# Patient Record
Sex: FEMALE | Race: WHITE | NOT HISPANIC OR LATINO | Employment: FULL TIME | URBAN - METROPOLITAN AREA
[De-identification: names, ages, dates, MRNs, and addresses within clinical notes are randomized per-mention and may not be internally consistent; named-entity substitution may affect disease eponyms.]

---

## 2017-01-24 ENCOUNTER — OFFICE VISIT (OUTPATIENT)
Dept: GASTROENTEROLOGY | Facility: CLINIC | Age: 57
End: 2017-01-24

## 2017-01-24 VITALS — WEIGHT: 175.8 LBS | HEIGHT: 65 IN | BODY MASS INDEX: 29.29 KG/M2

## 2017-01-24 DIAGNOSIS — K59.1 FUNCTIONAL DIARRHEA: ICD-10-CM

## 2017-01-24 DIAGNOSIS — R10.33 PERIUMBILICAL ABDOMINAL PAIN: Primary | ICD-10-CM

## 2017-01-24 PROCEDURE — 99214 OFFICE O/P EST MOD 30 MIN: CPT | Performed by: INTERNAL MEDICINE

## 2017-01-24 NOTE — MR AVS SNAPSHOT
Rosalinda Boone   1/24/2017 3:30 PM   Office Visit    Dept Phone:  [phone]   Encounter #:  [id number]    Provider:  Sandra Real MD   Department:  [facility] GASTROENTEROLOGY                Your Full Care Plan              Today's Medication Changes          These changes are accurate as of: 1/24/17  3:58 PM.  If you have any questions, ask your nurse or doctor.               New Medication(s)Ordered:     hyoscyamine 0.125 MG SL tablet   Commonly known as:  LEVSIN   Take 1 tablet by mouth Every 4 (Four) Hours As Needed for cramping.   Started by:  Sandra Real MD       rifaximin 550 MG tablet   Commonly known as:  XIFAXAN   Take 1 tablet by mouth Every 8 (Eight) Hours.   Started by:  Sandra Real MD            Where to Get Your Medications      These medications were sent to [email or web address] [address] AT [facility] of [address] [phone] [place] [phone] [place] [address], [facility] [phone]     Phone:  [phone]     hyoscyamine 0.125 MG SL tablet    rifaximin 550 MG tablet                  Your Updated Medication List          This list is accurate as of: 1/24/17  3:58 PM.  Always use your most recent med list.                ASMANEX 60 METERED DOSES 220 MCG/INH inhaler   Generic drug:  mometasone       BENEFIBER powder       cycloSPORINE 0.05 % ophthalmic emulsion   Commonly known as:  RESTASIS       desloratadine 5 MG tablet   Commonly known as:  CLARINEX       GLUCOSAMINE CHONDR 1500 COMPLX PO       hyoscyamine 0.125 MG SL tablet   Commonly known as:  LEVSIN   Take 1 tablet by mouth Every 4 (Four) Hours As Needed for cramping.       montelukast 10 MG tablet   Commonly known as:  SINGULAIR       MULTI VITAMIN DAILY PO       norethindrone-ethinyl estradiol 1-5 MG-MCG tablet   Commonly known as:  FEMHRT 1/5       pantoprazole 40 MG EC tablet   Commonly known as:  PROTONIX       PROAIR  (90 BASE) MCG/ACT inhaler   Generic drug:  albuterol       rifaximin 550 MG tablet   Commonly known as:  XIFAXAN   Take 1 tablet by mouth Every 8 (Eight) Hours.       Unable to find               You Were Diagnosed With        Codes Comments    Periumbilical abdominal pain    -  Primary ICD-10-CM: R10.33  ICD-9-CM: 789.05 plan for CT abd for further eval    Functional diarrhea     ICD-10-CM: K59.1  ICD-9-CM: 564.5 presumed IBS-d with negative w/u to date - trial antispasmodic, xifaxan - IBgard prn      Instructions     None    Patient Instructions History      Upcoming Appointments     Visit Type Date Time Department    FOLLOW UP 1/24/2017  3:30 PM [facility]    [facility] 6/12/2017  8:10 AM [facility]    PHYSICAL 6/16/2017  8:00 AM [facility]      [employer] Signup     Our records indicate that you have an active [employer] account.    You can view your After Visit Summary by going to [employer] and logging in with your [employer] username and password.  If you don't have a [employer] username and password but a parent or guardian has access to your record, the parent or guardian should login with their own [employer] username and password and access your record to view the After Visit Summary.    If you have questions, you can email [email or web address] or call [phone] to talk to our [employer] staff.  Remember, [employer] is NOT to be used for urgent needs.  For medical emergencies, dial 911.               Other Info from Your Visit           Your Appointments     Jun 12, 2017  8:10 AM EDT   [facility] with [facility]   [facility] INTERNAL MEDICINE (--)    [address] [place] [address] [place], [place] [id number] [phone]            Jun 16, 2017  8:00 AM EDT   Physical with Shraddha Sosa MD   [facility] INTERNAL MEDICINE (--)    [address] [place] [address] [place], [place] [id number] [phone]           Arrive 15 minutes  "prior to appointment.              Allergies     Codeine      Erythromycin  Diarrhea    Levaquin [Levofloxacin]      Sulfa Antibiotics        Reason for Visit     Abdominal Pain     Diarrhea     Nausea           Vital Signs     Height Weight Body Mass Index Smoking Status          65\" (165.1 cm) 175 lb 12.8 oz (79.7 kg) 29.25 kg/m2 Never Smoker        Problems and Diagnoses Noted     Abdominal pain, around belly button    -  Primary    Functional diarrhea            "

## 2017-01-24 NOTE — PROGRESS NOTES
Chief Complaint   Patient presents with   • Abdominal Pain   • Diarrhea   • Nausea       Rosalinda Boone is a  56 y.o. female here for a follow up visit for crampy rlq/periumbilical pain.  Intermittent - not related to certain foods.  Recent 24 hours of significant diarrhea x 24 hours with lingering cramping.  Takes IBgard when she feels bloated.  Takes fiber daily. She almost cut dairy out but still consumes some.  Dairy definitely made things worse. Intermittent upset stomach with nausea and diarrhea. Recent egd and c/s essentially normal.  She had a negative GB u/s last summer.    HPI  Past Medical History   Diagnosis Date   • Asthma    • Right sided abdominal pain    • Tingling      Past Surgical History   Procedure Laterality Date   • D&c with suction     • Lasik     • Endoscopy N/A 12/5/2016     z line regular, 40 cm from incisors,  granular gastric mucosa, chronic gastritis   • Colonoscopy N/A 12/5/2016     [facility],        Current Outpatient Prescriptions:   •  ASMANEX 60 METERED DOSES 220 MCG/INH inhaler, , Disp: , Rfl:   •  cycloSPORINE (RESTASIS) 0.05 % ophthalmic emulsion, 1 drop 2 (two) times a day, Disp: , Rfl:   •  desloratadine (CLARINEX) 5 MG tablet, , Disp: , Rfl:   •  Glucosamine-Chondroit-Vit C-Mn (GLUCOSAMINE CHONDR 1500 COMPLX PO), Take 2 tablets by mouth daily., Disp: , Rfl:   •  montelukast (SINGULAIR) 10 MG tablet, , Disp: , Rfl:   •  Multiple Vitamin (MULTI VITAMIN DAILY PO), Take 1 tablet by mouth daily., Disp: , Rfl:   •  norethindrone-ethinyl estradiol (FEMHRT 1/5) 1-5 MG-MCG tablet, Take 1 tablet by mouth Daily., Disp: , Rfl:   •  pantoprazole (PROTONIX) 40 MG EC tablet, , Disp: , Rfl:   •  PROAIR  (90 BASE) MCG/ACT inhaler, , Disp: , Rfl:   •  Unable to find, 1 each 1 (One) Time. IB GUARD, Disp: , Rfl:   •  Wheat Dextrin (BENEFIBER) powder, Take 1 application by mouth Daily., Disp: , Rfl:   •  hyoscyamine (LEVSIN) 0.125 MG SL tablet, Take 1 tablet by mouth Every 4 (Four) Hours As  Needed for cramping., Disp: 60 tablet, Rfl: 3  •  rifaximin (XIFAXAN) 550 MG tablet, Take 1 tablet by mouth Every 8 (Eight) Hours., Disp: 42 tablet, Rfl: 2  PRN Meds:.  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     Social History Main Topics   • Smoking status: Never Smoker   • Smokeless tobacco: Never Used   • Alcohol use 0.6 - 1.2 oz/week     1 - 2 Glasses of wine per week      Comment: daily   • Drug use: No   • Sexual activity: Yes     Partners: Male     Other Topics Concern   • Not on file     Social History Narrative    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   Respiratory: Negative for cough.    Gastrointestinal: Positive for nausea.     There were no vitals filed for this visit.  Last Weight    01/24/17  1516   Weight: 175 lb 12.8 oz (79.7 kg)     Physical Exam   Constitutional: She appears well-developed and well-nourished.   HENT:   Head: Normocephalic and atraumatic.   Eyes: No scleral icterus.   Abdominal: Soft. She exhibits no distension and no mass. There is tenderness.       Neurological: She is alert.   Skin: Skin is warm and dry.   Psychiatric: She has a normal mood and affect.     No images are attached to the encounter.  Diagnoses and all orders for this visit:    Periumbilical abdominal pain  Comments:  plan for CT abd for further eval  Orders:  -     CT Abdomen Pelvis With  Contrast; Future    Functional diarrhea  Comments:  presumed IBS-d with negative w/u to date - trial antispasmodic, xifaxan - IBgard prn    Other orders  -     Unable to find; 1 each 1 (One) Time. IB GUARD  -     Discontinue: rifaximin (XIFAXAN) 550 MG tablet; Take 1 tablet by mouth Every 8 (Eight) Hours.  -     Discontinue: hyoscyamine (LEVSIN) 0.125 MG SL tablet; Take 1 tablet by mouth Every 4 (Four) Hours As Needed for cramping.  -     hyoscyamine (LEVSIN) 0.125 MG SL tablet; Take 1 tablet by mouth Every 4 (Four) Hours As Needed for cramping.  -     rifaximin (XIFAXAN) 550 MG tablet; Take 1 tablet by mouth Every 8 (Eight) Hours.

## 2017-02-09 ENCOUNTER — HOSPITAL ENCOUNTER (OUTPATIENT)
Dept: CT IMAGING | Facility: HOSPITAL | Age: 57
Discharge: HOME | End: 2017-02-09
Attending: INTERNAL MEDICINE | Admitting: INTERNAL MEDICINE

## 2017-02-09 DIAGNOSIS — R10.33 PERIUMBILICAL ABDOMINAL PAIN: ICD-10-CM

## 2017-02-09 PROCEDURE — 25510000001 DIATRIZOATE MEGLUMINE & SODIUM PER 1 ML: Performed by: INTERNAL MEDICINE

## 2017-02-09 PROCEDURE — 82565 ASSAY OF CREATININE: CPT

## 2017-02-09 PROCEDURE — 74177 CT ABD & PELVIS W/CONTRAST: CPT

## 2017-02-09 PROCEDURE — 0 IOPAMIDOL 61 % SOLUTION: Performed by: INTERNAL MEDICINE

## 2017-02-09 RX ADMIN — DIATRIZOATE MEGLUMINE AND DIATRIZOATE SODIUM 30 ML: 660; 100 LIQUID ORAL; RECTAL at 08:00

## 2017-02-09 RX ADMIN — IOPAMIDOL 85 ML: 612 INJECTION, SOLUTION INTRAVENOUS at 09:00

## 2017-02-10 ENCOUNTER — TELEPHONE (OUTPATIENT)
Dept: GASTROENTEROLOGY | Facility: CLINIC | Age: 57
End: 2017-02-10

## 2017-02-10 LAB — CREAT BLDA-MCNC: 0.7 MG/DL (ref 0.6–1.3)

## 2017-02-10 NOTE — TELEPHONE ENCOUNTER
VM to pt - identifies as Rosalinda Boone.  Message left per Dr Real that her CT was normal.  If any questions, contact office.

## 2017-02-10 NOTE — TELEPHONE ENCOUNTER
----- Message from Sandra Real MD sent at 2/10/2017 12:06 PM EST -----  pls let the patient know that her CT was normal

## 2017-03-28 ENCOUNTER — OFFICE VISIT (OUTPATIENT)
Dept: GASTROENTEROLOGY | Facility: CLINIC | Age: 57
End: 2017-03-28

## 2017-03-28 VITALS
SYSTOLIC BLOOD PRESSURE: 124 MMHG | BODY MASS INDEX: 29.26 KG/M2 | DIASTOLIC BLOOD PRESSURE: 78 MMHG | HEIGHT: 65 IN | WEIGHT: 175.6 LBS

## 2017-03-28 DIAGNOSIS — E73.9 LACTOSE INTOLERANCE IN ADULT: ICD-10-CM

## 2017-03-28 DIAGNOSIS — K21.9 GASTROESOPHAGEAL REFLUX DISEASE WITHOUT ESOPHAGITIS: Primary | ICD-10-CM

## 2017-03-28 DIAGNOSIS — K58.0 IRRITABLE BOWEL SYNDROME WITH DIARRHEA: ICD-10-CM

## 2017-03-28 PROCEDURE — 99213 OFFICE O/P EST LOW 20 MIN: CPT | Performed by: INTERNAL MEDICINE

## 2017-03-28 RX ORDER — LANSOPRAZOLE 30 MG/1
30 CAPSULE, DELAYED RELEASE ORAL DAILY
Qty: 90 CAPSULE | Refills: 3 | Status: SHIPPED | OUTPATIENT
Start: 2017-03-28 | End: 2017-11-10

## 2017-03-28 NOTE — PROGRESS NOTES
Chief Complaint   Patient presents with   • Follow-up   • Heartburn   • Bloated       Rosalinda Boone is a  56 y.o. female here for a follow up visit for IBS-d, bloating.   Heartburn has been worse of late.  Symptoms both day and night.  Has been on pantoprazole with persistant symptoms.  Has tried rabeprazole and omeprazole in the past.  Her diarrhea has improved - has not had much diarrhea but continues to be blaoted  Her abdominal pain has improved. Since last visit she has had a CT scan which was negative.  She follows a dairy free diet.  HPI  Past Medical History:   Diagnosis Date   • Asthma    • Right sided abdominal pain    • Tingling      Past Surgical History:   Procedure Laterality Date   • COLONOSCOPY N/A 12/5/2016    [facility],    • D&C WITH SUCTION     • ENDOSCOPY N/A 12/5/2016    z line regular, 40 cm from incisors,  granular gastric mucosa, chronic gastritis   • LASIK         Current Outpatient Prescriptions:   •  ASMANEX 60 METERED DOSES 220 MCG/INH inhaler, , Disp: , Rfl:   •  cycloSPORINE (RESTASIS) 0.05 % ophthalmic emulsion, 1 drop 2 (two) times a day, Disp: , Rfl:   •  desloratadine (CLARINEX) 5 MG tablet, , Disp: , Rfl:   •  Glucosamine-Chondroit-Vit C-Mn (GLUCOSAMINE CHONDR 1500 COMPLX PO), Take 2 tablets by mouth daily., Disp: , Rfl:   •  montelukast (SINGULAIR) 10 MG tablet, , Disp: , Rfl:   •  Multiple Vitamin (MULTI VITAMIN DAILY PO), Take 1 tablet by mouth daily., Disp: , Rfl:   •  PROAIR  (90 BASE) MCG/ACT inhaler, , Disp: , Rfl:   •  Unable to find, 1 each 1 (One) Time. IB GUARD, Disp: , Rfl:   •  Wheat Dextrin (BENEFIBER) powder, Take 1 application by mouth Daily., Disp: , Rfl:   •  hyoscyamine (LEVSIN) 0.125 MG SL tablet, Take 1 tablet by mouth Every 4 (Four) Hours As Needed for cramping., Disp: 60 tablet, Rfl: 3  •  lansoprazole (PREVACID) 30 MG capsule, Take 1 capsule by mouth Daily., Disp: 90 capsule, Rfl: 3  •  norethindrone-ethinyl estradiol (FEMHRT 1/5) 1-5 MG-MCG tablet, Take 1  tablet by mouth Daily., Disp: , Rfl:   •  rifaximin (XIFAXAN) 550 MG tablet, Take 1 tablet by mouth Every 8 (Eight) Hours., Disp: 42 tablet, Rfl: 2  PRN Meds:.  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     Social History Main Topics   • Smoking status: Never Smoker   • Smokeless tobacco: Never Used   • Alcohol use 0.6 - 1.2 oz/week     1 - 2 Glasses of wine per week      Comment: daily   • Drug use: No   • Sexual activity: Yes     Partners: Male     Other Topics Concern   • Not on file     Social History Narrative    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   HENT:   Head: Normocephalic and atraumatic.   Eyes: No scleral icterus.   Abdominal: Soft. She exhibits no distension and no mass.   Neurological: She is alert.   Skin: Skin is warm and dry.   Psychiatric: She has a normal mood and affect.     No images are attached to the encounter.  Diagnoses and all orders for this visit:    Gastroesophageal reflux disease without esophagitis    Irritable bowel syndrome with diarrhea    Lactose intolerance in adult    Other orders  -     lansoprazole (PREVACID) 30 MG capsule; Take 1 capsule by mouth Daily.    Plan:- continue fiber  - dairy  free diet  - trial xifaxan with continued bloating  - switch to lansoprazole due to persistant symptoms despite pantoprazole

## 2017-06-09 ENCOUNTER — RESULTS ENCOUNTER (OUTPATIENT)
Dept: INTERNAL MEDICINE | Facility: CLINIC | Age: 57
End: 2017-06-09

## 2017-06-09 DIAGNOSIS — K21.9 GASTROESOPHAGEAL REFLUX DISEASE, ESOPHAGITIS PRESENCE NOT SPECIFIED: ICD-10-CM

## 2017-06-09 DIAGNOSIS — Z00.00 HEALTHCARE MAINTENANCE: ICD-10-CM

## 2017-06-09 DIAGNOSIS — E53.8 B12 DEFICIENCY: ICD-10-CM

## 2017-07-07 ENCOUNTER — OFFICE VISIT (OUTPATIENT)
Dept: INTERNAL MEDICINE | Facility: CLINIC | Age: 57
End: 2017-07-07

## 2017-07-07 VITALS
OXYGEN SATURATION: 99 % | WEIGHT: 176.7 LBS | HEIGHT: 65 IN | SYSTOLIC BLOOD PRESSURE: 108 MMHG | BODY MASS INDEX: 29.44 KG/M2 | DIASTOLIC BLOOD PRESSURE: 64 MMHG | HEART RATE: 74 BPM

## 2017-07-07 DIAGNOSIS — R31.9 URINARY TRACT INFECTION WITH HEMATURIA, SITE UNSPECIFIED: Primary | ICD-10-CM

## 2017-07-07 DIAGNOSIS — N39.0 URINARY TRACT INFECTION WITH HEMATURIA, SITE UNSPECIFIED: Primary | ICD-10-CM

## 2017-07-07 LAB
BILIRUB BLD-MCNC: NEGATIVE MG/DL
CLARITY, POC: CLEAR
COLOR UR: YELLOW
GLUCOSE UR STRIP-MCNC: NEGATIVE MG/DL
KETONES UR QL: NEGATIVE
LEUKOCYTE EST, POC: ABNORMAL
NITRITE UR-MCNC: NEGATIVE MG/ML
PH UR: 6 [PH] (ref 5–8)
PROT UR STRIP-MCNC: NEGATIVE MG/DL
RBC # UR STRIP: ABNORMAL /UL
SP GR UR: 1.01 (ref 1–1.03)
UROBILINOGEN UR QL: NORMAL

## 2017-07-07 PROCEDURE — 81003 URINALYSIS AUTO W/O SCOPE: CPT | Performed by: NURSE PRACTITIONER

## 2017-07-07 PROCEDURE — 99213 OFFICE O/P EST LOW 20 MIN: CPT | Performed by: NURSE PRACTITIONER

## 2017-07-07 RX ORDER — NITROFURANTOIN 25; 75 MG/1; MG/1
100 CAPSULE ORAL 2 TIMES DAILY
Qty: 10 CAPSULE | Refills: 0 | Status: SHIPPED | OUTPATIENT
Start: 2017-07-07 | End: 2017-07-20

## 2017-07-07 NOTE — PROGRESS NOTES
"Subjective   Rosalinda Boone is a 57 y.o. female who states she thinks she may have a UTI. She started 2 days ago with some burning with u/a.     History of Present Illness   The patient is here today with c/o dysuria and cramping. Noticed incomplete bladder emptying starting yesterday.     Used to have UTIs frequently saw urology for blood in urine. Work up ok per patient. Has not had a UTI in a \"long time\".  The following portions of the patient's history were reviewed and updated as appropriate: allergies, current medications, past family history, past medical history, past social history, past surgical history and problem list.    Review of Systems   Constitutional: Negative.    Respiratory: Negative.    Cardiovascular: Negative.    Genitourinary: Positive for dysuria, frequency and urgency. Negative for hematuria.   Psychiatric/Behavioral: Negative.    All other systems reviewed and are negative.      Objective   Physical Exam   Constitutional: She appears well-developed and well-nourished.   Neck: Normal range of motion. Neck supple. No thyromegaly present.   Cardiovascular: Normal rate, regular rhythm, normal heart sounds and intact distal pulses.    Pulmonary/Chest: Effort normal and breath sounds normal.   Abdominal: There is no tenderness. There is no CVA tenderness.   Skin: Skin is warm and dry.   Psychiatric: She has a normal mood and affect. Her behavior is normal. Judgment and thought content normal.       Assessment/Plan   Rosalinda was seen today for urinary tract infection.    Diagnoses and all orders for this visit:    Urinary tract infection with hematuria, site unspecified  -     POCT urinalysis dipstick, automated  -     Urine culture (clean catch)      1. UTI- UA pos. For leuks, will go ahead and treat with macrobid as she has done well with this in the past. Will send urine for culture. Hydrate well. Probiotic daily.   Off xifaxin right now.          "

## 2017-07-08 LAB
25(OH)D3+25(OH)D2 SERPL-MCNC: 34.2 NG/ML (ref 30–100)
ALBUMIN SERPL-MCNC: 4.3 G/DL (ref 3.5–5.2)
ALBUMIN/GLOB SERPL: 1.8 G/DL
ALP SERPL-CCNC: 50 U/L (ref 39–117)
ALT SERPL-CCNC: 16 U/L (ref 1–33)
AST SERPL-CCNC: 16 U/L (ref 1–32)
BILIRUB SERPL-MCNC: 0.6 MG/DL (ref 0.1–1.2)
BUN SERPL-MCNC: 12 MG/DL (ref 6–20)
BUN/CREAT SERPL: 14.6 (ref 7–25)
CALCIUM SERPL-MCNC: 9.1 MG/DL (ref 8.6–10.5)
CHLORIDE SERPL-SCNC: 98 MMOL/L (ref 98–107)
CHOLEST SERPL-MCNC: 198 MG/DL (ref 0–200)
CO2 SERPL-SCNC: 23.7 MMOL/L (ref 22–29)
CREAT SERPL-MCNC: 0.82 MG/DL (ref 0.57–1)
GLOBULIN SER CALC-MCNC: 2.4 GM/DL
GLUCOSE SERPL-MCNC: 90 MG/DL (ref 65–99)
HCV AB S/CO SERPL IA: <0.1 S/CO RATIO (ref 0–0.9)
HDLC SERPL-MCNC: 69 MG/DL (ref 40–60)
LDLC SERPL CALC-MCNC: 96 MG/DL (ref 0–100)
LDLC/HDLC SERPL: 1.39 {RATIO}
POTASSIUM SERPL-SCNC: 4.4 MMOL/L (ref 3.5–5.2)
PROT SERPL-MCNC: 6.7 G/DL (ref 6–8.5)
SODIUM SERPL-SCNC: 139 MMOL/L (ref 136–145)
TRIGL SERPL-MCNC: 165 MG/DL (ref 0–150)
TSH SERPL DL<=0.005 MIU/L-ACNC: 2.15 MIU/ML (ref 0.27–4.2)
VIT B12 SERPL-MCNC: 443 PG/ML (ref 211–946)
VLDLC SERPL CALC-MCNC: 33 MG/DL (ref 5–40)

## 2017-07-12 ENCOUNTER — OFFICE VISIT (OUTPATIENT)
Dept: ORTHOPEDIC SURGERY | Facility: CLINIC | Age: 57
End: 2017-07-12

## 2017-07-12 ENCOUNTER — TELEPHONE (OUTPATIENT)
Dept: INTERNAL MEDICINE | Facility: CLINIC | Age: 57
End: 2017-07-12

## 2017-07-12 VITALS — BODY MASS INDEX: 29.62 KG/M2 | WEIGHT: 177.8 LBS | TEMPERATURE: 98.1 F | HEIGHT: 65 IN

## 2017-07-12 DIAGNOSIS — M54.16 LUMBAR RADICULAR PAIN: ICD-10-CM

## 2017-07-12 DIAGNOSIS — M41.20 IDIOPATHIC SCOLIOSIS AND KYPHOSCOLIOSIS: ICD-10-CM

## 2017-07-12 DIAGNOSIS — M25.552 HIP PAIN, LEFT: Primary | ICD-10-CM

## 2017-07-12 DIAGNOSIS — M76.32 ILIOTIBIAL BAND SYNDROME OF LEFT SIDE: ICD-10-CM

## 2017-07-12 DIAGNOSIS — M47.896 OTHER OSTEOARTHRITIS OF SPINE, LUMBAR REGION: ICD-10-CM

## 2017-07-12 PROBLEM — M47.816 OSTEOARTHRITIS OF LUMBAR SPINE: Status: ACTIVE | Noted: 2017-07-12

## 2017-07-12 LAB
BACTERIA UR CULT: ABNORMAL
BACTERIA UR CULT: ABNORMAL
OTHER ANTIBIOTIC SUSC ISLT: ABNORMAL

## 2017-07-12 PROCEDURE — 72100 X-RAY EXAM L-S SPINE 2/3 VWS: CPT | Performed by: ORTHOPAEDIC SURGERY

## 2017-07-12 PROCEDURE — 99203 OFFICE O/P NEW LOW 30 MIN: CPT | Performed by: ORTHOPAEDIC SURGERY

## 2017-07-12 RX ORDER — TERBINAFINE HYDROCHLORIDE 250 MG/1
TABLET ORAL
COMMUNITY
Start: 2017-07-10 | End: 2017-09-27

## 2017-07-12 NOTE — PROGRESS NOTES
"Patient:  Rosalinda Almaraz is a 57 y.o. female    Chief Complaint/ Reason for Visit:    Chief Complaint   Patient presents with   • Left Hip - Establish Care, Pain       HPI:  This pleasant young lady presents today complaining of chronic pain in the lateral aspect of her left hip.  She complains of low her left-sided back pain and posterior lateral hip pain as well.  She says that it has worsened over the past 6 months.  She says that she has a history of scoliosis from birth, and says that that is caused some arthritis in her back, and says that her hips have been out of alignment \"my whole life\".  She does not have any groin pain on either side, nor does she have any acute changes in bowel or bladder control.  She has not experienced any acute weakness, nor has she had any numbness, tingling, or other acute sensory changes.  She did have an episode this past Sunday where she had a lot of pain on the lateral aspect of her left hip and even some popping in that region.  She says that she can't sleep on her left side many nights due to pain associated with being in the lateral position.  The pain is moderate in intensity, aching in character, and, as noted, she occasionally associated with popping.  Standing and walking exacerbate the pain, and rest and anti-inflammatories have eased the pain somewhat.  She has no complaints with her right hip.      PMH:    Past Medical History:   Diagnosis Date   • Asthma    • Right sided abdominal pain    • Tingling        PSH:    Past Surgical History:   Procedure Laterality Date   • COLONOSCOPY N/A 12/5/2016    [facility],    • D&C WITH SUCTION     • ENDOSCOPY N/A 12/5/2016    z line regular, 40 cm from incisors,  granular gastric mucosa, chronic gastritis   • LASIK         Social Hx:    Social History     Social History   • Marital status:      Spouse name: MATTIE ALMARAZ   • Number of children: 2   • Years of education: N/A     Occupational History   • [place], [employer]      " 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     Social History Main Topics   • Smoking status: Never Smoker   • Smokeless tobacco: Never Used   • Alcohol use 0.6 - 1.2 oz/week     1 - 2 Glasses of wine per week      Comment: daily   • Drug use: No   • Sexual activity: Yes     Partners: Male     Other Topics Concern   • Not on file     Social History Narrative    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 [employer]       Family Hx:    Family History   Problem Relation Age of Onset   • Cancer Father 63     lung-smoker   • Aneurysm Mother 73   • Scleroderma Maternal Aunt    • Leukemia Maternal Grandmother    • Aneurysm Maternal Grandfather      brain       Meds:    Current Outpatient Prescriptions:   •  ASMANEX 60 METERED DOSES 220 MCG/INH inhaler, , Disp: , Rfl:   •  cycloSPORINE (RESTASIS) 0.05 % ophthalmic emulsion, 1 drop 2 (two) times a day, Disp: , Rfl:   •  desloratadine (CLARINEX) 5 MG tablet, , Disp: , Rfl:   •  Glucosamine-Chondroit-Vit C-Mn (GLUCOSAMINE CHONDR 1500 COMPLX PO), Take 2 tablets by mouth daily., Disp: , Rfl:   •  hyoscyamine (LEVSIN) 0.125 MG SL tablet, Take 1 tablet by mouth Every 4 (Four) Hours As Needed for cramping., Disp: 60 tablet, Rfl: 3  •  lansoprazole (PREVACID) 30 MG capsule, Take 1 capsule by mouth Daily., Disp: 90 capsule, Rfl: 3  •  montelukast (SINGULAIR) 10 MG tablet, , Disp: , Rfl:   •  Multiple Vitamin (MULTI VITAMIN DAILY PO), Take 1 tablet by mouth daily., Disp: , Rfl:   •  nitrofurantoin, macrocrystal-monohydrate, (MACROBID) 100 MG capsule, Take 1 capsule by mouth 2 (Two) Times a Day., Disp: 10 capsule, Rfl: 0  •  norethindrone-ethinyl estradiol (FEMHRT 1/5) 1-5 MG-MCG tablet, Take 1 tablet by mouth Daily., Disp: , Rfl:   •  PROAIR  (90 BASE) MCG/ACT inhaler, , Disp: , Rfl:   •  Unable to find, 1 each 1 (One) Time. IB GUARD, Disp: , Rfl:   •  Wheat Dextrin (BENEFIBER) powder, Take 1 application by mouth Daily., Disp: , Rfl:   •  rifaximin (XIFAXAN) 550 MG tablet, Take 1 tablet by mouth  "Every 8 (Eight) Hours., Disp: 42 tablet, Rfl: 2  •  terbinafine (lamiSIL) 250 MG tablet, , Disp: , Rfl:     Allergies:    Allergies   Allergen Reactions   • Codeine    • Erythromycin Diarrhea   • Levaquin [Levofloxacin]    • Qvar [Beclomethasone]      Tingling on one side of face   • Sulfa Antibiotics        ROS:  Review of Systems   Constitutional: Negative for chills, fever and unexpected weight change.   HENT: Negative for trouble swallowing and voice change.    Eyes: Negative for visual disturbance. Eye itching: dry.   Respiratory: Positive for cough. Negative for shortness of breath (breathing difficulties).    Cardiovascular: Negative for chest pain and leg swelling.   Gastrointestinal: Negative for abdominal pain, nausea and vomiting.   Endocrine: Negative for cold intolerance and heat intolerance.   Genitourinary: Negative for difficulty urinating, frequency and urgency.   Musculoskeletal: Positive for arthralgias, joint swelling and myalgias.   Skin: Negative for rash and wound.   Allergic/Immunologic: Negative for immunocompromised state.   Neurological: Positive for headaches. Negative for weakness and numbness.   Hematological: Does not bruise/bleed easily.   Psychiatric/Behavioral: Negative for dysphoric mood. The patient is not nervous/anxious.        Vitals:    07/12/17 1547   Temp: 98.1 °F (36.7 °C)   TempSrc: Temporal Artery    Weight: 177 lb 12.8 oz (80.6 kg)   Height: 65\" (165.1 cm)   Body mass index is 29.59 kg/(m^2).      Physical Exam    The patient is awake, alert, and oriented ×3.  The patient is in no acute distress.  Breathing is regular and unlabored with a respiratory rate of 12/m.  Extraocular movements and pupillary responses are symmetrically intact. Sclerae are anicteric.   Hearing is within normal limits.  Speech is within normal limits.  There is no jugular venous distention.    The patient's gait shows slight antalgia from the left, somewhat consistent with a Trendelenburg gait.  " "Grossly, her left hip does appear \"higher\" in her right.  She clearly has significant lumbar or thoracolumbar scoliosis.    Examination of the patient's hip joints reveal negative Stinchfield test bilaterally, negative logroll test bilaterally, and full, functional ranges of motion bilaterally, including flexion, extension, and internal and external rotations.  However, straight leg raise markedly reproduces the patient's lateral posterior lateral left hip pain on the left side.  Straight leg raising is negative on the right.  Adduction of the left hip markedly reproduces the patient's posterior lateral lateral hip pain as well.  Tenderness over the left greater trochanter is mild.  She has no tenderness over the right hip.  The patient has no popliteal or inguinal lymphadenopathy in either lower extremity.  Her calves are both soft and nontender with no venous cord.  Deep tendon reflexes are brisk and symmetrical in the patellar and Achilles tendons bilaterally.        Radiology: AP and lateral images of the patient's lumbosacral spine were ordered and reviewed today to assess the patient's complaints of back and hip pain.  These images reveal what appeared to me to be fairly severe thoracolumbar/lumbar scoliosis changes, with developing degenerative changes in the posterior elements and in multiple disc spaces.  Incidental note is made, on the AP view, that the patient does not have any appreciable degenerative changes in her hip joints.  Comparison images were not available          Assessment:  1. Hip pain, left    - Ambulatory Referral to Physical Therapy Evaluate and treat, Ortho  - Ambulatory Referral to Orthopedic Surgery    2. Idiopathic scoliosis and kyphoscoliosis    - Ambulatory Referral to Physical Therapy Evaluate and treat, Ortho  - Ambulatory Referral to Orthopedic Surgery    3. Other osteoarthritis of spine, lumbar region    - Ambulatory Referral to Physical Therapy Evaluate and treat, Ortho  - " Ambulatory Referral to Orthopedic Surgery    4. Lumbar radicular pain      5. Iliotibial band syndrome of left side            Plan:  I discussed everything with the patient at length and in great detail.  We reviewed the options.  She says that she has benefited from chiropractic treatment in the past, and I certainly have no problem with her revisiting that modality.  Additionally, I have recommended physical therapy as well as evaluation by a spine surgeon.  She has agreed with my recommendations.    Appropriate referrals were made.      Orders Placed This Encounter   Procedures   • Ambulatory Referral to Physical Therapy Evaluate and treat, Ortho     Referral Priority:   Routine     Referral Type:   Therapy     Referral Reason:   Specialty Services Required     Requested Specialty:   Physical Therapy     Number of Visits Requested:   1   • Ambulatory Referral to Orthopedic Surgery     Referral Priority:   Routine     Referral Type:   Consultation     Referral Reason:   Specialty Services Required     Referred to Provider:   Corey Miller MD     Requested Specialty:   Orthopedic Surgery     Number of Visits Requested:   1

## 2017-07-12 NOTE — TELEPHONE ENCOUNTER
----- Message from ANOOP Steward sent at 7/12/2017  1:30 PM EDT -----  Urine cx pos, pt is on correct antibiotic, please check that symptoms are improving

## 2017-07-20 ENCOUNTER — OFFICE VISIT (OUTPATIENT)
Dept: INTERNAL MEDICINE | Facility: CLINIC | Age: 57
End: 2017-07-20

## 2017-07-20 VITALS
BODY MASS INDEX: 29.47 KG/M2 | HEART RATE: 89 BPM | DIASTOLIC BLOOD PRESSURE: 70 MMHG | SYSTOLIC BLOOD PRESSURE: 124 MMHG | OXYGEN SATURATION: 97 % | WEIGHT: 176.9 LBS | HEIGHT: 65 IN

## 2017-07-20 DIAGNOSIS — N39.0 RECURRENT UTI: ICD-10-CM

## 2017-07-20 DIAGNOSIS — Z78.0 POSTMENOPAUSAL: ICD-10-CM

## 2017-07-20 DIAGNOSIS — Z00.00 HEALTH CARE MAINTENANCE: ICD-10-CM

## 2017-07-20 DIAGNOSIS — K21.9 GASTROESOPHAGEAL REFLUX DISEASE WITHOUT ESOPHAGITIS: Primary | ICD-10-CM

## 2017-07-20 PROCEDURE — 99396 PREV VISIT EST AGE 40-64: CPT | Performed by: INTERNAL MEDICINE

## 2017-07-20 PROCEDURE — 81003 URINALYSIS AUTO W/O SCOPE: CPT | Performed by: INTERNAL MEDICINE

## 2017-07-20 RX ORDER — AZELASTINE HCL 205.5 UG/1
1 SPRAY NASAL DAILY PRN
COMMUNITY
Start: 2017-07-19

## 2017-07-20 NOTE — PROGRESS NOTES
Subjective   Rosalinda Almaraz is a 57 y.o. female and is here for a comprehensive physical exam. The patient reports problems - gerd.  Pt has been compliant with meds for GERD.  No sx as long as pt takes medicine as prescribed.  No epigastric pain or reflux sx  She has been on HRT for a year  And she has done ok with this.  She lately has had an occas episode of feeling flushed and sweaty.   She had been on OCP for 35years and then changed to HRT.  She does occas have some insomnia.  She does snore but does not know if she has any witnessed apnea.  She does still have some IBS sx and she sees GI    Pt is UTD with annual gyn exam and mammo SHe is past due seeing a gyn and wants a new referral    Do you take any herbs or supplements that were not prescribed by a doctor?     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     Social History Main Topics   • Smoking status: Never Smoker   • Smokeless tobacco: Never Used   • Alcohol use 0.6 - 1.2 oz/week     1 - 2 Glasses of wine per week      Comment: daily   • Drug use: No   • Sexual activity: Yes     Partners: Male     Other Topics Concern   • Not on file     Social History Narrative    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    Objective   /70 (BP Location: Left arm,  "Patient Position: Sitting)  Pulse 89  Ht 65\" (165.1 cm)  Wt 176 lb 14.4 oz (80.2 kg)  SpO2 97%  BMI 29.44 kg/m2    General Appearance:    Alert, cooperative, no distress, appears stated age   Head:    Normocephalic, without obvious abnormality, atraumatic   Eyes:    PERRL, conjunctiva/corneas clear, EOM's intact, fundi     benign, both eyes   Ears:    Normal TM's and external ear canals, both ears   Nose:   Nares normal, septum midline, mucosa normal, no drainage    or sinus tenderness   Throat:   Lips, mucosa, and tongue normal; teeth and gums normal   Neck:   Supple, symmetrical, trachea midline, no adenopathy;     thyroid:  no enlargement/tenderness/nodules; no carotid    bruit or JVD   Back:     Symmetric, no curvature, ROM normal, no CVA tenderness   Lungs:     Clear to auscultation bilaterally, respirations unlabored   Chest Wall:    No tenderness or deformity    Heart:    Regular rate and rhythm, S1 and S2 normal, no murmur, rub   or gallop       Abdomen:     Soft, non-tender, bowel sounds active all four quadrants,     no masses, no organomegaly           Extremities:   Extremities normal, atraumatic, no cyanosis or edema   Pulses:   2+ and symmetric all extremities   Skin:   Skin color, texture, turgor normal, no rashes or lesions   Lymph nodes:   Cervical, supraclavicular, and axillary nodes normal   Neurologic:   CNII-XII intact, normal strength, sensation and reflexes     throughout         Medications:   Current Outpatient Prescriptions:   •  ASMANEX 60 METERED DOSES 220 MCG/INH inhaler, , Disp: , Rfl:   •  azelastine (ASTEPRO) 0.15 % solution nasal spray, 1 spray into each nostril Daily., Disp: , Rfl:   •  cycloSPORINE (RESTASIS) 0.05 % ophthalmic emulsion, 1 drop 2 (two) times a day, Disp: , Rfl:   •  desloratadine (CLARINEX) 5 MG tablet, , Disp: , Rfl:   •  FLUTICASONE PROPIONATE, NASAL, NA, 1 spray into each nostril Daily., Disp: , Rfl:   •  Glucosamine-Chondroit-Vit C-Mn (GLUCOSAMINE CHONDR " 1500 COMPLX PO), Take 2 tablets by mouth daily., Disp: , Rfl:   •  lansoprazole (PREVACID) 30 MG capsule, Take 1 capsule by mouth Daily., Disp: 90 capsule, Rfl: 3  •  montelukast (SINGULAIR) 10 MG tablet, , Disp: , Rfl:   •  Multiple Vitamin (MULTI VITAMIN DAILY PO), Take 1 tablet by mouth daily., Disp: , Rfl:   •  norethindrone-ethinyl estradiol (FEMHRT 1/5) 1-5 MG-MCG tablet, Take 1 tablet by mouth Daily., Disp: , Rfl:   •  PROAIR  (90 BASE) MCG/ACT inhaler, , Disp: , Rfl:   •  Unable to find, 1 each 1 (One) Time. IB GUARD, Disp: , Rfl:   •  Wheat Dextrin (BENEFIBER) powder, Take 1 application by mouth Daily., Disp: , Rfl:   •  terbinafine (lamiSIL) 250 MG tablet, , Disp: , Rfl:        Assessment/Plan   Healthy female exam.      1. Healthcare Maintenance:  2. Patient Counseling:  --Nutrition: Stressed importance of moderation in sodium/caffeine intake, saturated fat and cholesterol, caloric balance, sufficient intake of fresh fruits, vegetables, fiber, calcium and vit D  --Exercise: I have rec 30minutes a day  --Substance Abuse: no tob occas etoh  --Dental health: she does go to the dentist reg  --Discussed benefits of screening colonoscopy utd  3. Fatigue-  She may have HANSEL though she wonders if her hrt could be making her feel worse.  4. GERD- stable ith PPI  5. IBS- she has seen GI for this   6. Post menopausal  She is not sure she needs the HRT  She will attempt to wean this and see how she does.  I am going to refer her to see gyn

## 2017-08-01 ENCOUNTER — CONSULT (OUTPATIENT)
Dept: ORTHOPEDIC SURGERY | Facility: CLINIC | Age: 57
End: 2017-08-01

## 2017-08-01 VITALS — BODY MASS INDEX: 29.32 KG/M2 | HEIGHT: 65 IN | WEIGHT: 176 LBS | TEMPERATURE: 98.1 F

## 2017-08-01 DIAGNOSIS — M41.9 ACQUIRED SCOLIOSIS: Primary | ICD-10-CM

## 2017-08-01 DIAGNOSIS — M41.129 ADOLESCENT SCOLIOSIS: ICD-10-CM

## 2017-08-01 PROCEDURE — 99213 OFFICE O/P EST LOW 20 MIN: CPT | Performed by: ORTHOPAEDIC SURGERY

## 2017-08-01 NOTE — PROGRESS NOTES
New patient or new problem visit    Chief Complaint   Patient presents with   • Lumbar Spine - Establish Care       HPI: She has bilateral hip pain which is improving immensely with chiropractic, centralizing to her long-standing tolerable back pain.  She seen today at request of Dr. Yu.    PFSH: See chart- reviewed    Review of Systems   Constitutional: Negative.  Negative for chills, fever and unexpected weight change.   HENT: Negative.  Negative for trouble swallowing and voice change.    Eyes: Negative.  Negative for visual disturbance.   Respiratory: Positive for cough. Negative for shortness of breath.    Cardiovascular: Negative.  Negative for chest pain and leg swelling.   Gastrointestinal: Negative.  Negative for abdominal pain, nausea and vomiting.   Endocrine: Negative.  Negative for cold intolerance and heat intolerance.   Genitourinary: Negative.  Negative for difficulty urinating, frequency and urgency.   Musculoskeletal: Positive for back pain, gait problem and myalgias.   Skin: Negative.  Negative for rash and wound.   Allergic/Immunologic: Negative.  Negative for immunocompromised state.   Neurological: Negative for weakness and numbness.   Hematological: Bruises/bleeds easily.   Psychiatric/Behavioral: Negative.  Negative for dysphoric mood. The patient is not nervous/anxious.        PE: Constitutional: Vital signs above-noted.  Awake, alert and oriented    Psychiatric: Affect and insight do not appear grossly disturbed.    Pulmonary: Breathing is unlabored, color is good.    Skin: Warm, dry and normal turgor    Cardiac:  pedal pulses intact.  No edema.    Eyesight and hearing appear adequate for examination purposes      Musculoskeletal:  There is minimal tenderness to percussion and palpation of the spine. Motion appears undisturbed.  Posture is unremarkable to coronal and sagittal inspection.    The skin about the area is intact.  There is no palpable or visible deformity.  There is no local  spasm.  Stinchfield test is negative.       Neurologic:   Reflexes are 2+ and symmetrical in the patellae and achilles.   Motor function is undisturbed in quadriceps, EHL, and gastrocnemius      Sensation appears symmetrically intact to light touch   .  In the bilateral lower extremities there is no evidence of atrophy.   Clonus is absent..  Gait appears undisturbed.  SLR test negative      MEDICAL DECISION MAKING    XRAY: Previously obtained Plain film x-ray show 45° scoliosis apex L2-3 multilevel disc degeneration.  Hip joints are fairly well-maintained the left somewhat narrower than the right.    Other: n/a    Impression: I believe that her hip pain was likely from her lumbar radiculopathy, and has centralized and is no longer causing her pain    Plan: As she is neurologically intact, and her scoliosis unlikely to progress, I see no need of the further evaluation.  I'll see her back as needed

## 2017-09-01 ENCOUNTER — OFFICE VISIT (OUTPATIENT)
Dept: OBSTETRICS AND GYNECOLOGY | Facility: CLINIC | Age: 57
End: 2017-09-01

## 2017-09-01 VITALS
HEIGHT: 65 IN | WEIGHT: 174.5 LBS | SYSTOLIC BLOOD PRESSURE: 122 MMHG | BODY MASS INDEX: 29.07 KG/M2 | DIASTOLIC BLOOD PRESSURE: 80 MMHG

## 2017-09-01 DIAGNOSIS — Z12.39 BREAST CANCER SCREENING: ICD-10-CM

## 2017-09-01 DIAGNOSIS — Z01.419 WELL WOMAN EXAM WITH ROUTINE GYNECOLOGICAL EXAM: Primary | ICD-10-CM

## 2017-09-01 DIAGNOSIS — Z13.9 SCREENING: ICD-10-CM

## 2017-09-01 DIAGNOSIS — Z12.4 CERVICAL CANCER SCREENING: ICD-10-CM

## 2017-09-01 DIAGNOSIS — Z78.0 POST-MENOPAUSAL: ICD-10-CM

## 2017-09-01 PROCEDURE — 99386 PREV VISIT NEW AGE 40-64: CPT | Performed by: NURSE PRACTITIONER

## 2017-09-01 PROCEDURE — 81002 URINALYSIS NONAUTO W/O SCOPE: CPT | Performed by: NURSE PRACTITIONER

## 2017-09-01 NOTE — PROGRESS NOTES
GYN Annual Exam     Chief Complaint   Patient presents with   • Gynecologic Exam       Rosalinda Boone is a 57 y.o. female who presents for annual well woman exam. She is new to the practice. She was a patient of Dr. Garcia. She is menopausal. Taking Estradiol 2 day and then skipping 1 day. Periods are absent  LNMP- approx 1-2 years ago. She desires to wean herself off of the estradiol. States she doesn't like how she is feeling and is concerned that the hormone is making her feel bad.  No intermenstrual bleeding, spotting, or discharge. Needs a MMG. Recently had a colonoscopy and EGD.      OB History      Para Term  AB TAB SAB Ectopic Multiple Living    2 2 2                 Current contraception: post menopausal status  History of abnormal Pap smear: no  Family history of uterine, colon or ovarian cancer: no  History of abnormal mammogram: no  Family history of breast cancer: no  Last Pap : Pap  NIL     Past Medical History:   Diagnosis Date   • Acid reflux    • Asthma    • Right sided abdominal pain    • Tingling        Past Surgical History:   Procedure Laterality Date   • COLONOSCOPY N/A 2016    [place],    • D&C WITH SUCTION     • ENDOSCOPY N/A 2016    z line regular, 40 cm from incisors,  granular gastric mucosa, chronic gastritis   • LASIK           Current Outpatient Prescriptions:   •  ASMANEX 60 METERED DOSES 220 MCG/INH inhaler, , Disp: , Rfl:   •  azelastine (ASTEPRO) 0.15 % solution nasal spray, 1 spray into each nostril Daily., Disp: , Rfl:   •  cycloSPORINE (RESTASIS) 0.05 % ophthalmic emulsion, 1 drop 2 (two) times a day, Disp: , Rfl:   •  desloratadine (CLARINEX) 5 MG tablet, , Disp: , Rfl:   •  FLUTICASONE PROPIONATE, NASAL, NA, 1 spray into each nostril Daily., Disp: , Rfl:   •  Glucosamine-Chondroit-Vit C-Mn (GLUCOSAMINE CHONDR 1500 COMPLX PO), Take 2 tablets by mouth daily., Disp: , Rfl:   •  lansoprazole (PREVACID) 30 MG capsule, Take 1 capsule by mouth Daily., Disp:  "90 capsule, Rfl: 3  •  montelukast (SINGULAIR) 10 MG tablet, , Disp: , Rfl:   •  Multiple Vitamin (MULTI VITAMIN DAILY PO), Take 1 tablet by mouth daily., Disp: , Rfl:   •  norethindrone-ethinyl estradiol (FEMHRT 1/5) 1-5 MG-MCG tablet, Take 1 tablet by mouth Daily., Disp: , Rfl:   •  PROAIR  (90 BASE) MCG/ACT inhaler, , Disp: , Rfl:   •  terbinafine (lamiSIL) 250 MG tablet, , Disp: , Rfl:   •  Unable to find, 1 each 1 (One) Time. IB GUARD, Disp: , Rfl:   •  Wheat Dextrin (BENEFIBER) powder, Take 1 application by mouth Daily., Disp: , Rfl:     Allergies   Allergen Reactions   • Codeine    • Erythromycin Diarrhea   • Levaquin [Levofloxacin]    • Qvar [Beclomethasone]      Tingling on one side of face   • Sulfa Antibiotics        Social History   Substance Use Topics   • Smoking status: Never Smoker   • Smokeless tobacco: Never Used   • Alcohol use 0.6 - 1.2 oz/week     1 - 2 Glasses of wine per week      Comment: daily       Family History   Problem Relation Age of Onset   • Cancer Father 63     lung-smoker   • Aneurysm Mother 73   • Scleroderma Maternal Aunt    • Leukemia Maternal Grandmother    • Aneurysm Maternal Grandfather      brain       Review of Systems   Constitutional: Negative.    Respiratory: Negative.    Cardiovascular: Negative.    Gastrointestinal: Negative.    Endocrine: Negative.    Genitourinary: Negative.    Musculoskeletal: Negative.    Skin: Negative.    Neurological: Negative.    Psychiatric/Behavioral: Negative.        /80  Ht 65\" (165.1 cm)  Wt 174 lb 8 oz (79.2 kg)  BMI 29.04 kg/m2    Physical Exam   Constitutional: She is oriented to person, place, and time. She appears well-developed and well-nourished.   Neck: Normal range of motion. Neck supple. No thyromegaly present.   Cardiovascular: Normal rate and regular rhythm.    Pulmonary/Chest: Effort normal and breath sounds normal. Right breast exhibits no inverted nipple, no mass, no nipple discharge, no skin change and no " tenderness. Left breast exhibits no inverted nipple, no mass, no nipple discharge, no skin change and no tenderness.   Abdominal: Soft. Bowel sounds are normal.   Genitourinary: Rectum normal. Pelvic exam was performed with patient supine. There is no rash, tenderness, lesion or injury on the right labia. There is no rash, tenderness, lesion or injury on the left labia. Uterus is not deviated, not enlarged, not fixed and not tender. Cervix exhibits no motion tenderness, no discharge and no friability. Right adnexum displays no mass, no tenderness and no fullness. Left adnexum displays no mass, no tenderness and no fullness. No erythema, tenderness or bleeding in the vagina. No foreign body in the vagina. No signs of injury around the vagina. No vaginal discharge found.   Neurological: She is alert and oriented to person, place, and time.   Skin: Skin is warm and dry.   Psychiatric: She has a normal mood and affect. Her behavior is normal.   Vitals reviewed.         Assessment     1) GYN annual well woman exam.   2) Postmenopausal   3) Screening for breast cancer  4) Screening for cervical cancer     Plan     1) Breast Health - Clinical breast exam & mammogram yearly, Self breast awareness monthly  2) Pap - and HPV collected  3) STD-declines screen  4) - Instructed on how to wean off of her estradiol. Pt agrees with plan of care.   5)  Smoking status- non smoker   6) Colon health - screening colonoscopy recommended if not up to date  7) Bone health - Weight bearing exercise, dietary calcium recommendations and vitamin D  reviewed.   8) Follow up prn and one year    ANOOP Brody  9/1/2017  10:28 AM

## 2017-09-06 LAB
BILIRUB BLD-MCNC: NEGATIVE MG/DL
CLARITY, POC: CLEAR
COLOR UR: YELLOW
CYTOLOGIST CVX/VAG CYTO: NORMAL
CYTOLOGY CVX/VAG DOC THIN PREP: NORMAL
DX ICD CODE: NORMAL
GLUCOSE UR STRIP-MCNC: NEGATIVE MG/DL
HIV 1 & 2 AB SER-IMP: NORMAL
HPV I/H RISK 1 DNA CVX QL PROBE+SIG AMP: NEGATIVE
KETONES UR QL: NEGATIVE
LEUKOCYTE EST, POC: NEGATIVE
NITRITE UR-MCNC: NEGATIVE MG/ML
OTHER STN SPEC: NORMAL
PATH REPORT.FINAL DX SPEC: NORMAL
PH UR: 5 [PH] (ref 5–8)
PROT UR STRIP-MCNC: NEGATIVE MG/DL
RBC # UR STRIP: NEGATIVE /UL
SP GR UR: 1.01 (ref 1–1.03)
STAT OF ADQ CVX/VAG CYTO-IMP: NORMAL
UROBILINOGEN UR QL: NORMAL

## 2017-09-19 PROBLEM — Z12.4 CERVICAL CANCER SCREENING: Status: ACTIVE | Noted: 2017-09-19

## 2017-09-19 PROBLEM — Z01.419 WELL WOMAN EXAM WITH ROUTINE GYNECOLOGICAL EXAM: Status: ACTIVE | Noted: 2017-09-19

## 2017-09-19 PROBLEM — Z12.39 BREAST CANCER SCREENING: Status: ACTIVE | Noted: 2017-09-19

## 2017-09-19 PROBLEM — Z78.0 POST-MENOPAUSAL: Status: ACTIVE | Noted: 2017-09-19

## 2017-09-21 ENCOUNTER — TELEPHONE (OUTPATIENT)
Dept: OBSTETRICS AND GYNECOLOGY | Facility: CLINIC | Age: 57
End: 2017-09-21

## 2017-09-21 NOTE — TELEPHONE ENCOUNTER
Pt weaning off her estradiol, is now taking it every third day. Experiencing dizziness, hot flashes, fatigued, wanted to know if it could be from weaning off the meds, if there is something she should do differently? Will be out of her pills tomorrow and wanted to know if she should get a refill to keep weaning slowly instead of stopping altogether tomorrow.

## 2017-09-22 RX ORDER — NORETHINDRONE ACETATE AND ETHINYL ESTRADIOL .5; 2.5 MG/1; UG/1
TABLET ORAL
Qty: 30 TABLET | Refills: 0 | Status: SHIPPED | OUTPATIENT
Start: 2017-09-22 | End: 2017-09-27

## 2017-09-22 NOTE — TELEPHONE ENCOUNTER
I spoke with the patient. She was taking 1 tablet every 4th day. States she is having dizziness and hot flashes. New RX of FEMHRT 0.5/2.5mg  Pt instructed to take 1 tablet every other day x 2 weeks. After completion of 2 weeks, if she feels well, will stop the HRT. Pt verbalized understanding.

## 2017-09-27 ENCOUNTER — OFFICE VISIT (OUTPATIENT)
Dept: INTERNAL MEDICINE | Facility: CLINIC | Age: 57
End: 2017-09-27

## 2017-09-27 VITALS
HEIGHT: 65 IN | SYSTOLIC BLOOD PRESSURE: 112 MMHG | HEART RATE: 85 BPM | BODY MASS INDEX: 28.49 KG/M2 | DIASTOLIC BLOOD PRESSURE: 80 MMHG | OXYGEN SATURATION: 98 % | WEIGHT: 171 LBS

## 2017-09-27 DIAGNOSIS — R00.0 TACHYCARDIA: Primary | ICD-10-CM

## 2017-09-27 PROCEDURE — 99213 OFFICE O/P EST LOW 20 MIN: CPT | Performed by: NURSE PRACTITIONER

## 2017-09-27 RX ORDER — NORETHINDRONE ACETATE AND ETHINYL ESTRADIOL .5; 2.5 MG/1; UG/1
1 TABLET ORAL DAILY
COMMUNITY
End: 2017-11-20

## 2017-09-27 NOTE — PROGRESS NOTES
Subjective   Rosalinda Boone is a 57 y.o. female here for dizziness, headache and tachycardia.    History of Present Illness   The patient is here today with c/o dizziness, headache and tachycardia.   Has been weaning off of hormones since July. She is down to 1/2 dose every other day for 2 weeks, at the end of weaning.   Since December she has had intermittent feelings of feeling faint for a few minutes. It would go away on it's own, she would feel her heart racing.   Has been having a lot of heart burn with weaning off of hormones. States asthma is in good control per allergist.     Yesterday felt faint/woozy, over the next 30 minutes felt worse. Felt heart fluttering, felt bad all night. Started with a headache at night, now with crushing headache all day.   Has had vertigo, this does not feel similiarly.     She had a holter monitor checked in 2014, very short run of V-tach, few PVCs.   The following portions of the patient's history were reviewed and updated as appropriate: allergies, current medications, past family history, past medical history, past social history, past surgical history and problem list.    Review of Systems   Constitutional: Negative.    Respiratory: Positive for shortness of breath.    Cardiovascular: Positive for palpitations.   Neurological: Positive for dizziness and headaches.   All other systems reviewed and are negative.      Objective   Physical Exam   Constitutional: She appears well-developed and well-nourished.   Neck: Normal range of motion. Neck supple. No thyromegaly present.   Cardiovascular: Normal rate, regular rhythm, normal heart sounds and intact distal pulses.    Pulmonary/Chest: Effort normal and breath sounds normal.   Skin: Skin is warm and dry.   Psychiatric: She has a normal mood and affect. Her behavior is normal. Judgment and thought content normal.   EKG- NSR, no change from baseline.   Vitals:    09/27/17 1507   BP: 112/80   BP Location: Right arm   Pulse: 85  "  SpO2: 98%   Weight: 171 lb (77.6 kg)   Height: 65\" (165.1 cm)       Current Outpatient Prescriptions:   •  ASMANEX 60 METERED DOSES 220 MCG/INH inhaler, , Disp: , Rfl:   •  azelastine (ASTEPRO) 0.15 % solution nasal spray, 1 spray into each nostril Daily., Disp: , Rfl:   •  cycloSPORINE (RESTASIS) 0.05 % ophthalmic emulsion, 1 drop 2 (two) times a day, Disp: , Rfl:   •  desloratadine (CLARINEX) 5 MG tablet, , Disp: , Rfl:   •  FLUTICASONE PROPIONATE, NASAL, NA, 1 spray into each nostril Daily., Disp: , Rfl:   •  Glucosamine-Chondroit-Vit C-Mn (GLUCOSAMINE CHONDR 1500 COMPLX PO), Take 2 tablets by mouth daily., Disp: , Rfl:   •  lansoprazole (PREVACID) 30 MG capsule, Take 1 capsule by mouth Daily., Disp: 90 capsule, Rfl: 3  •  montelukast (SINGULAIR) 10 MG tablet, , Disp: , Rfl:   •  Multiple Vitamin (MULTI VITAMIN DAILY PO), Take 1 tablet by mouth daily., Disp: , Rfl:   •  norethindrone-ethinyl estradiol (FEMHRT LOW DOSE) 0.5-2.5 MG-MCG per tablet, Take 1 tablet by mouth Daily., Disp: , Rfl:   •  PROAIR  (90 BASE) MCG/ACT inhaler, , Disp: , Rfl:   •  Unable to find, 1 each 1 (One) Time. IB GUARD, Disp: , Rfl:   •  Wheat Dextrin (BENEFIBER) powder, Take 1 application by mouth Daily., Disp: , Rfl:     Assessment/Plan   There are no diagnoses linked to this encounter.    1. Tachycardia- EKG today, holter ordered, discussed adequate hydration, avoidance of caffeine and sudafed, small frequent meals. Will refer to cardiology if symptoms continue.   We did discuss could be anxiety but need to r/o heart problems.        "

## 2017-09-28 ENCOUNTER — HOSPITAL ENCOUNTER (OUTPATIENT)
Dept: CARDIOLOGY | Facility: HOSPITAL | Age: 57
Discharge: HOME | End: 2017-09-28
Admitting: NURSE PRACTITIONER

## 2017-09-28 PROCEDURE — 93225 XTRNL ECG REC<48 HRS REC: CPT

## 2017-10-03 ENCOUNTER — APPOINTMENT (OUTPATIENT)
Dept: WOMENS IMAGING | Facility: HOSPITAL | Age: 57
End: 2017-10-03

## 2017-10-03 PROCEDURE — 77063 BREAST TOMOSYNTHESIS BI: CPT | Performed by: RADIOLOGY

## 2017-10-03 PROCEDURE — 77067 SCR MAMMO BI INCL CAD: CPT | Performed by: RADIOLOGY

## 2017-10-03 PROCEDURE — G0202 SCR MAMMO BI INCL CAD: HCPCS | Performed by: RADIOLOGY

## 2017-10-04 DIAGNOSIS — Z12.39 BREAST CANCER SCREENING: ICD-10-CM

## 2017-10-05 PROCEDURE — 93227 XTRNL ECG REC<48 HR R&I: CPT | Performed by: INTERNAL MEDICINE

## 2017-10-30 ENCOUNTER — OFFICE VISIT (OUTPATIENT)
Dept: INTERNAL MEDICINE | Facility: CLINIC | Age: 57
End: 2017-10-30

## 2017-10-30 VITALS
HEIGHT: 65 IN | OXYGEN SATURATION: 98 % | HEART RATE: 83 BPM | DIASTOLIC BLOOD PRESSURE: 66 MMHG | WEIGHT: 170 LBS | SYSTOLIC BLOOD PRESSURE: 112 MMHG | BODY MASS INDEX: 28.32 KG/M2

## 2017-10-30 DIAGNOSIS — N76.0 ACUTE VAGINITIS: Primary | ICD-10-CM

## 2017-10-30 DIAGNOSIS — R30.0 DYSURIA: ICD-10-CM

## 2017-10-30 DIAGNOSIS — L23.9 ALLERGIC DERMATITIS: ICD-10-CM

## 2017-10-30 LAB
BILIRUB BLD-MCNC: NEGATIVE MG/DL
CLARITY, POC: CLEAR
COLOR UR: YELLOW
GLUCOSE UR STRIP-MCNC: NEGATIVE MG/DL
KETONES UR QL: NEGATIVE
LEUKOCYTE EST, POC: ABNORMAL
NITRITE UR-MCNC: NEGATIVE MG/ML
PH UR: 6 [PH] (ref 5–8)
PROT UR STRIP-MCNC: NEGATIVE MG/DL
RBC # UR STRIP: ABNORMAL /UL
SP GR UR: 1 (ref 1–1.03)
UROBILINOGEN UR QL: NORMAL

## 2017-10-30 PROCEDURE — 81002 URINALYSIS NONAUTO W/O SCOPE: CPT | Performed by: NURSE PRACTITIONER

## 2017-10-30 PROCEDURE — 99213 OFFICE O/P EST LOW 20 MIN: CPT | Performed by: NURSE PRACTITIONER

## 2017-10-30 RX ORDER — FLUCONAZOLE 150 MG/1
150 TABLET ORAL ONCE
Qty: 1 TABLET | Refills: 0 | Status: SHIPPED | OUTPATIENT
Start: 2017-10-30 | End: 2017-10-30

## 2017-10-30 NOTE — PROGRESS NOTES
Subjective   Rosalinda Boone is a 57 y.o. female. Patient is here today for   Chief Complaint   Patient presents with   • Rash     Pt complains of having a rash on her upper back, right arm & chest area. Pt was treated for a UTI @ [facility] on 10/3/2017, given macrobid. Pt returned to [facility] on 10/21/2017 & given Keflex. Pt does continue to have bump & vaginal itching since finishing this antibiotic.     .    History of Present Illness   C/o rash for a few days that is itchy. It is started on her upper back and has spread to her shoulder and down her chest. She recently finished Keflex for a UTI and a few weeks prior to that, she had taken Macrobid for a UTI. States that she is still having some abdominal cramping, but dysuria has improved. She does also have some vaginal itching that started a few days ago. Denies any vaginal discharge.   States that about 20 years ago she had a urology workup due to hematuria.      The following portions of the patient's history were reviewed and updated as appropriate: allergies, current medications, past family history, past medical history, past social history, past surgical history and problem list.    Review of Systems   Constitutional: Negative.    Respiratory: Negative.    Cardiovascular: Negative.    Genitourinary: Negative for dysuria and vaginal discharge.   Skin: Positive for rash.       Objective   Vitals:    10/30/17 1139   BP: 112/66   Pulse: 83   SpO2: 98%     Results for orders placed or performed in visit on 10/30/17   POC Urinalysis Dipstick   Result Value Ref Range    Color Yellow Yellow, Straw, Dark Yellow, Ama    Clarity, UA Clear Clear    Glucose, UA Negative Negative, 1000 mg/dL (3+) mg/dL    Bilirubin Negative Negative    Ketones, UA Negative Negative    Specific Gravity  1.005 1.005 - 1.030    Blood, UA 1+ (A) Negative    pH, Urine 6.0 5.0 - 8.0    Protein, POC Negative Negative mg/dL    Urobilinogen, UA Normal Normal    Leukocytes Trace  (A) Negative    Nitrite, UA Negative Negative       Physical Exam   Constitutional: Vital signs are normal. She appears well-developed and well-nourished.   Cardiovascular: Normal rate, regular rhythm and normal heart sounds.    Pulmonary/Chest: Effort normal and breath sounds normal.   Skin: Skin is warm, dry and intact. Rash noted. Rash is urticarial.        Psychiatric: She has a normal mood and affect. Her speech is normal and behavior is normal. Thought content normal.   Nursing note and vitals reviewed.      Assessment/Plan   Rosalinda was seen today for rash.    Diagnoses and all orders for this visit:    Acute vaginitis  -     fluconazole (DIFLUCAN) 150 MG tablet; Take 1 tablet by mouth 1 (One) Time for 1 dose.    Dysuria  -     POC Urinalysis Dipstick  -     Urine Culture - Urine, Urine, Clean Catch    Allergic dermatitis    No further antibiotic will be given until urine culture results are completed. Patient is to call if her symptoms worsen over the next couple of days.    Dermatitis - Will document Keflex as an allergy. Patient did not want steroids at this time to help with the rash.  She will take Benadryl as needed.  She does already take Clarinex and Singulair for her allergies.

## 2017-11-01 LAB
BACTERIA UR CULT: NORMAL
BACTERIA UR CULT: NORMAL

## 2017-11-02 ENCOUNTER — TELEPHONE (OUTPATIENT)
Dept: INTERNAL MEDICINE | Facility: CLINIC | Age: 57
End: 2017-11-02

## 2017-11-02 NOTE — TELEPHONE ENCOUNTER
----- Message from ANOOP Gilbert sent at 11/2/2017  8:25 AM EDT -----  Please let patient know that her urine culture is negative. No antibiotic needed.

## 2017-11-10 ENCOUNTER — TELEPHONE (OUTPATIENT)
Dept: GASTROENTEROLOGY | Facility: CLINIC | Age: 57
End: 2017-11-10

## 2017-11-10 RX ORDER — DEXLANSOPRAZOLE 30 MG/1
30 CAPSULE, DELAYED RELEASE ORAL DAILY
Qty: 30 CAPSULE | Refills: 1 | Status: SHIPPED | OUTPATIENT
Start: 2017-11-10 | End: 2017-12-10

## 2017-11-10 NOTE — TELEPHONE ENCOUNTER
Pt called and advised that approx 2 wks ago the prevacid stopped working .  She states her heartburn has gotten worse and she has 2 really bad episodes that lasted 24hrs.  She has tried omeprazole and aciphex in the past.  She is asking if there is any other medication she can try.  Advised would send message to Dr Real. Pt verb understanding.     Pt states when we call back we can leave a vm on the [phone] number.

## 2017-11-10 NOTE — TELEPHONE ENCOUNTER
Trial dexilant 30 mg - pls leave her some samples if needs a pa (can't tell from [place])- sent to pharmacy

## 2017-11-10 NOTE — TELEPHONE ENCOUNTER
----- Message from Bernice Pelaez sent at 11/10/2017  9:54 AM EST -----  Regarding: PT CALLED  Contact: [phone]   PT IS CALLING STATING THE MEDICATION lansoprazole (PREVACID) 30 MG capsule IS NO LONGER WORKING FOR HER & SHE WOULD LIKE TO GET ANOTHER MEDICATION CALLED IN.    PHARM#[phone]

## 2017-11-10 NOTE — TELEPHONE ENCOUNTER
Called pt and advised per Dr Real that she can trial dexilant 30mg daily .  Advised that she has sent this to her pharmacy and she may need a pa.  Advised we will have 5 boxes of samples for her at the  for her. Pt verb understanding.

## 2017-11-20 ENCOUNTER — OFFICE VISIT (OUTPATIENT)
Dept: INTERNAL MEDICINE | Facility: CLINIC | Age: 57
End: 2017-11-20

## 2017-11-20 VITALS
BODY MASS INDEX: 27.82 KG/M2 | HEIGHT: 65 IN | OXYGEN SATURATION: 99 % | WEIGHT: 167 LBS | DIASTOLIC BLOOD PRESSURE: 80 MMHG | HEART RATE: 78 BPM | SYSTOLIC BLOOD PRESSURE: 110 MMHG

## 2017-11-20 DIAGNOSIS — R00.2 HEART PALPITATIONS: Primary | ICD-10-CM

## 2017-11-20 DIAGNOSIS — G47.00 INSOMNIA, UNSPECIFIED TYPE: ICD-10-CM

## 2017-11-20 PROCEDURE — 99213 OFFICE O/P EST LOW 20 MIN: CPT | Performed by: NURSE PRACTITIONER

## 2017-11-20 NOTE — PROGRESS NOTES
Subjective   Rosalinda Boone is a 57 y.o. female here for a follow up on palpitations and shortness of breath.    History of Present Illness   The patient is here today to follow-up on tachycardia. EKG nl 9/2017, holter showed NSR. She reports continued symptoms of SOA and feeling of irregular heart beats. Noticed recently went up a flight of stairs. She does note she got off her hormones for about a month. She does not exercise.   She reports she has lost 20 lbs in the last few months. Trying to cut portion sizes and fast food.   She is not sure if this is anxiety, she does report that she does not sleep well.   She did see her pulmonologist and asthma ok. Recently saw allergist which was ok as well.   Insomnia- falls asleep easily, has a hard time staying asleep.   The following portions of the patient's history were reviewed and updated as appropriate: allergies, current medications, past family history, past medical history, past social history, past surgical history and problem list.    Review of Systems   Respiratory: Positive for cough and shortness of breath.    Cardiovascular: Positive for palpitations.   Psychiatric/Behavioral: Positive for sleep disturbance.       Objective   Physical Exam   Constitutional: She appears well-developed and well-nourished.   Neck: Normal range of motion. Neck supple. No thyromegaly present.   Cardiovascular: Normal rate, regular rhythm, normal heart sounds and intact distal pulses.    Pulmonary/Chest: Effort normal and breath sounds normal.   Skin: Skin is warm and dry.   Psychiatric: She has a normal mood and affect. Her behavior is normal. Judgment and thought content normal.     Vitals:    11/20/17 1055   BP: 110/80   Pulse: 78   SpO2: 99%       Current Outpatient Prescriptions:   •  ASMANEX 60 METERED DOSES 220 MCG/INH inhaler, , Disp: , Rfl:   •  azelastine (ASTEPRO) 0.15 % solution nasal spray, 1 spray into each nostril Daily., Disp: , Rfl:   •  cycloSPORINE (RESTASIS)  0.05 % ophthalmic emulsion, 1 drop 2 (two) times a day, Disp: , Rfl:   •  desloratadine (CLARINEX) 5 MG tablet, , Disp: , Rfl:   •  dexlansoprazole (DEXILANT) 30 MG capsule, Take 1 capsule by mouth Daily for 30 days., Disp: 30 capsule, Rfl: 1  •  FLUTICASONE PROPIONATE, NASAL, NA, 1 spray into each nostril Daily., Disp: , Rfl:   •  Glucosamine-Chondroit-Vit C-Mn (GLUCOSAMINE CHONDR 1500 COMPLX PO), Take 2 tablets by mouth daily., Disp: , Rfl:   •  montelukast (SINGULAIR) 10 MG tablet, , Disp: , Rfl:   •  Multiple Vitamin (MULTI VITAMIN DAILY PO), Take 1 tablet by mouth daily., Disp: , Rfl:   •  Unable to find, 1 each 1 (One) Time. IB GUARD, Disp: , Rfl:   •  Wheat Dextrin (BENEFIBER) powder, Take 1 application by mouth Daily., Disp: , Rfl:   •  PROAIR  (90 BASE) MCG/ACT inhaler, , Disp: , Rfl:     Assessment/Plan   There are no diagnoses linked to this encounter.     1. Heart palpitations- will refer to cardiology, discussed pt has low risk factors, did neg vascular screening with fam hx of aneurysm. If heart r/o I have encouraged pt to work on exercise and stress. She has now had these symptoms with and with exercise for nearly a year.   Stress test neg 2014  2. Insomnia- pt defers med, try melatonin, could try elavil

## 2017-12-21 ENCOUNTER — OFFICE VISIT (OUTPATIENT)
Dept: RETAIL CLINIC | Facility: CLINIC | Age: 57
End: 2017-12-21

## 2017-12-21 VITALS
SYSTOLIC BLOOD PRESSURE: 118 MMHG | TEMPERATURE: 97.4 F | HEART RATE: 94 BPM | DIASTOLIC BLOOD PRESSURE: 66 MMHG | RESPIRATION RATE: 18 BRPM | OXYGEN SATURATION: 98 %

## 2017-12-21 DIAGNOSIS — N30.01 ACUTE CYSTITIS WITH HEMATURIA: Primary | ICD-10-CM

## 2017-12-21 PROCEDURE — 81003 URINALYSIS AUTO W/O SCOPE: CPT | Performed by: NURSE PRACTITIONER

## 2017-12-21 PROCEDURE — 99213 OFFICE O/P EST LOW 20 MIN: CPT | Performed by: NURSE PRACTITIONER

## 2017-12-21 RX ORDER — NITROFURANTOIN 25; 75 MG/1; MG/1
100 CAPSULE ORAL EVERY 12 HOURS SCHEDULED
Qty: 14 CAPSULE | Refills: 0 | Status: SHIPPED | OUTPATIENT
Start: 2017-12-21 | End: 2018-01-29

## 2017-12-22 ENCOUNTER — OFFICE VISIT (OUTPATIENT)
Dept: CARDIOLOGY | Facility: CLINIC | Age: 57
End: 2017-12-22

## 2017-12-22 VITALS
WEIGHT: 167 LBS | DIASTOLIC BLOOD PRESSURE: 78 MMHG | BODY MASS INDEX: 27.82 KG/M2 | HEART RATE: 74 BPM | SYSTOLIC BLOOD PRESSURE: 118 MMHG | HEIGHT: 65 IN

## 2017-12-22 DIAGNOSIS — R06.02 SHORTNESS OF BREATH: ICD-10-CM

## 2017-12-22 DIAGNOSIS — R00.2 PALPITATIONS: Primary | ICD-10-CM

## 2017-12-22 DIAGNOSIS — R07.89 CHEST TIGHTNESS: ICD-10-CM

## 2017-12-22 PROCEDURE — 93000 ELECTROCARDIOGRAM COMPLETE: CPT | Performed by: INTERNAL MEDICINE

## 2017-12-22 PROCEDURE — 99204 OFFICE O/P NEW MOD 45 MIN: CPT | Performed by: INTERNAL MEDICINE

## 2017-12-22 NOTE — PATIENT INSTRUCTIONS
Urinary Tract Infection, Adult  A urinary tract infection (UTI) is an infection of any part of the urinary tract, which includes the kidneys, ureters, bladder, and urethra. These organs make, store, and get rid of urine in the body. UTI can be a bladder infection (cystitis) or kidney infection (pyelonephritis).  CAUSES  This infection may be caused by fungi, viruses, or bacteria. Bacteria are the most common cause of UTIs. This condition can also be caused by repeated incomplete emptying of the bladder during urination.   RISK FACTORS  This condition is more likely to develop if:   · You ignore your need to urinate or hold urine for long periods of time.  · You do not empty your bladder completely during urination.  · You wipe back to front after urinating or having a bowel movement, if you are female.  · You are uncircumcised, if you are male.    · You are constipated.  · You have a urinary catheter that stays in place (indwelling).  · You have a weak defense (immune) system.  · You have a medical condition that affects your bowels, kidneys, or bladder.  · You have diabetes.  · You take antibiotic medicines frequently or for long periods of time, and the antibiotics no longer work well against certain types of infections (antibiotic resistance).  · You take medicines that irritate your urinary tract.  · You are exposed to chemicals that irritate your urinary tract.  · You are female.  SYMPTOMS   Symptoms of this condition include:   · Fever.    · Frequent urination or passing small amounts of urine frequently.    · Needing to urinate urgently.    · Pain or burning with urination.    · Urine that smells bad or unusual.    · Cloudy urine.    · Pain in the lower abdomen or back.    · Trouble urinating.    · Blood in the urine.    · Vomiting or being less hungry than normal.     · Diarrhea or abdominal pain.    · Vaginal discharge, if you are female.  DIAGNOSIS  This condition is diagnosed with a medical history and  physical exam. You will also need to provide a urine sample to test your urine. Other tests may be done, including:    · Blood tests.    · Sexually transmitted disease (STD) testing.     If you have had more than one UTI, a cystoscopy or imaging studies may be done to determine the cause of the infections.   TREATMENT   Treatment for this condition often includes a combination of two or more of the following:   · Antibiotic medicine.    · Other medicines to treat less common causes of UTI.    · Over-the-counter medicines to treat pain.    · Drinking enough water to stay hydrated.  HOME CARE INSTRUCTIONS  · Take over-the-counter and prescription medicines only as told by your health care provider.  · If you were prescribed an antibiotic, take it as told by your health care provider. Do not stop taking the antibiotic even if you start to feel better.  · Avoid alcohol, caffeine, tea, and carbonated beverages. They can irritate your bladder.  · Drink enough fluid to keep your urine clear or pale yellow.  · Keep all follow-up visits as told by your health care provider. This is important.  · Make sure to:    Empty your bladder often and completely. Do not hold urine for long periods of time.    Empty your bladder before and after sex.    Wipe from front to back after a bowel movement if you are female. Use each tissue one time when you wipe.  SEEK MEDICAL CARE IF:   · You have back pain.    · You have a fever.  · You feel nauseous or vomit.    · Your symptoms do not get better after 3 days.     · Your symptoms go away and then return.  SEEK IMMEDIATE MEDICAL CARE IF:   · You have severe back pain or lower abdominal pain.    · You are vomiting and cannot keep down any medicines or water.     This information is not intended to replace advice given to you by your health care provider. Make sure you discuss any questions you have with your health care provider.     Document Released: 09/27/2006 Document Revised: 04/10/2017  Document Reviewed: 11/07/2016  [employer] Interactive Patient Education ©2017 [employer]

## 2017-12-22 NOTE — PROGRESS NOTES
Subjective   Patient ID: Rosalinda Boone is a 57 y.o. female presents with   Chief Complaint   Patient presents with   • Urinary Tract Infection     x 1 day       Urinary Tract Infection    This is a new problem. The current episode started yesterday. The problem occurs every urination. The problem has been unchanged. The patient is experiencing no pain. There has been no fever. She is sexually active. There is no history of pyelonephritis. Associated symptoms include frequency, hesitancy and urgency. Pertinent negatives include no chills, discharge, flank pain, hematuria, nausea or vomiting. She has tried nothing for the symptoms. Her past medical history is significant for recurrent UTIs. There is no history of catheterization, kidney stones, a single kidney, urinary stasis or a urological procedure.       Allergies   Allergen Reactions   • Codeine    • Erythromycin Diarrhea   • Levaquin [Levofloxacin]    • Qvar [Beclomethasone]      Tingling on one side of face   • Sulfa Antibiotics    • Keflex [Cephalexin] Rash       The following portions of the patient's history were reviewed and updated as appropriate: allergies, current medications, past family history, past medical history, past social history, past surgical history and problem list.      Review of Systems   Constitutional: Negative for chills and fever.   HENT: Negative for congestion.    Respiratory: Negative for cough.    Cardiovascular: Negative for chest pain.   Gastrointestinal: Negative for abdominal distention, abdominal pain, diarrhea, nausea and vomiting.   Genitourinary: Positive for frequency, hesitancy and urgency. Negative for decreased urine volume, difficulty urinating, dysuria, flank pain, hematuria, pelvic pain, vaginal bleeding and vaginal discharge.       Objective     Vitals:    12/21/17 1943   BP: 118/66   Pulse: 94   Resp: 18   Temp: 97.4 °F (36.3 °C)   SpO2: 98%         Physical Exam   Constitutional: She is oriented to person, place,  and time. She appears well-developed and well-nourished. She does not appear ill. No distress.   HENT:   Head: Normocephalic.   Right Ear: Hearing, tympanic membrane, external ear and ear canal normal.   Left Ear: Hearing, tympanic membrane, external ear and ear canal normal.   Nose: Nose normal. No rhinorrhea or sinus tenderness.   Mouth/Throat: Oropharynx is clear and moist and mucous membranes are normal. Tonsils are 2+ on the right. Tonsils are 2+ on the left. No tonsillar exudate.   Eyes: Conjunctivae are normal.   Sclera white.   Neck: No tracheal deviation present.   Cardiovascular: Normal rate, regular rhythm, S1 normal, S2 normal and normal heart sounds.    Pulmonary/Chest: Effort normal and breath sounds normal. No accessory muscle usage. No respiratory distress.   Abdominal: Soft. Bowel sounds are normal. There is no hepatosplenomegaly. There is no tenderness. There is no CVA tenderness.   Lymphadenopathy:     She has no cervical adenopathy.   Neurological: She is alert and oriented to person, place, and time.   Skin: Skin is warm and dry.   Vitals reviewed.        Rosalinda was seen today for urinary tract infection.    Diagnoses and all orders for this visit:    Acute cystitis with hematuria  -     nitrofurantoin, macrocrystal-monohydrate, (MACROBID) 100 MG capsule; Take 1 capsule by mouth Every 12 (Twelve) Hours.  -     POC Urinalysis Dipstick, Automated  -     Urine Culture, Comprehen ([facility]) - Urine, Clean Catch      Await urine culture.  Push fluids.  Macrobid as prescribed.  Follow up if no improvement or worsening.  Follow-up with Primary Care Physician in 48-72 hours if these symptoms worsen or fail to improve as anticipated. Patient verbalizes understanding.

## 2017-12-23 ENCOUNTER — TELEPHONE (OUTPATIENT)
Dept: RETAIL CLINIC | Facility: CLINIC | Age: 57
End: 2017-12-23

## 2017-12-24 NOTE — TELEPHONE ENCOUNTER
Advised that urine culture from 12/21/17 essentially negative. Patient reports feeling better. May stop antibiotic. Advised to f/u with pcp any issues.

## 2017-12-29 NOTE — PROGRESS NOTES
"Date of Office Visit: 2017  Encounter Provider: Moo Samuels MD  Place of Service: [facility] CARDIOLOGY  Patient Name: Rosalinda Boone  :1960    Chief complaint: Palpitations, chest tightness.    History of Present Illness:    I had the pleasure of seeing the patient in cardiology office on 2017.  She is a   very pleasant 57 year-old white female with a past medical history of GERD,   asthma, and gastritis who presents for evaluation.  The patient states that several   years ago, she began to have palpitations, and she was diagnosed with a \"funky   heart beat\".  However, this was all that was explained to her at the time.  She has   felt more palpitations recently which she describes as a \"flipping\" sensation which   occurs for a few seconds.  She typically feels as if she needs to take a deep breath   or cough when these occur.  She has not had any associated syncope or   presyncope.  She does state that she has recently started menopause, and   recently also went off of hormones.  She does state that there is a correlation with   the symptoms and these events occurring.  She also states that she has had   occasional chest tightness and shortness of breath.  This feels like a band like   sensation around her chest, and does not occur specifically with exertion.  It   typically last for several minutes before resolving.  She did have her TSH checked   in 2017, and this was normal at 2.15.  Her EKG from today is also essentially   normal.  She did wear a Holter monitor on 2017 which showed only rare PVCs,   and was normal.    Past Medical History:   Diagnosis Date   • Asthma    • Gastritis    • GERD (gastroesophageal reflux disease)    • IBS (irritable bowel syndrome)    • Palpitations    • Right sided abdominal pain    • SOB (shortness of breath)    • Tachycardia    • Tingling        Past Surgical History:   Procedure Laterality Date   • COLONOSCOPY " N/A 12/5/2016    [facility],    • D&C WITH SUCTION     • ENDOSCOPY N/A 12/5/2016    z line regular, 40 cm from incisors,  granular gastric mucosa, chronic gastritis   • LASIK         Current Outpatient Prescriptions on File Prior to Visit   Medication Sig Dispense Refill   • ASMANEX 60 METERED DOSES 220 MCG/INH inhaler      • azelastine (ASTEPRO) 0.15 % solution nasal spray 1 spray into each nostril Daily.     • cycloSPORINE (RESTASIS) 0.05 % ophthalmic emulsion 1 drop 2 (two) times a day     • desloratadine (CLARINEX) 5 MG tablet      • FLUTICASONE PROPIONATE, NASAL, NA 1 spray into each nostril Daily.     • Glucosamine-Chondroit-Vit C-Mn (GLUCOSAMINE CHONDR 1500 COMPLX PO) Take 2 tablets by mouth daily.     • montelukast (SINGULAIR) 10 MG tablet      • Multiple Vitamin (MULTI VITAMIN DAILY PO) Take 1 tablet by mouth daily.     • nitrofurantoin, macrocrystal-monohydrate, (MACROBID) 100 MG capsule Take 1 capsule by mouth Every 12 (Twelve) Hours. 14 capsule 0   • PROAIR  (90 BASE) MCG/ACT inhaler      • Unable to find 1 each 1 (One) Time. IB GUARD     • Wheat Dextrin (BENEFIBER) powder Take 1 application by mouth Daily.       No current facility-administered medications on file prior to visit.      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     Social History Main Topics   • Smoking status: Never Smoker   • Smokeless tobacco: Never Used   • Alcohol use 0.6 - 1.2 oz/week     1 - 2 Glasses of wine per week      Comment: daily   • Drug use: No   •  "Sexual activity: Yes     Partners: Male     Other Topics Concern   • Not on file     Social History Narrative    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   Cardiovascular: Positive for chest pain and palpitations.   All other systems reviewed and are negative.    Objective:     Vitals:    12/22/17 0934   BP: 118/78   Pulse: 74   Weight: 75.8 kg (167 lb)   Height: 165.1 cm (65\")     Body mass index is 27.79 kg/(m^2).    Physical Exam   Constitutional: She is oriented to person, place, and time. She appears well-developed and well-nourished.   HENT:   Head: Normocephalic and atraumatic.   Eyes: Conjunctivae are normal.   Neck: Neck supple.   Cardiovascular: Normal rate and regular rhythm.  Exam reveals no gallop and no friction rub.    No murmur heard.  Pulmonary/Chest: Effort normal and breath sounds normal.   Abdominal: Soft. There is no tenderness.   Musculoskeletal: She exhibits no edema.   Neurological: She is alert and oriented to person, place, and time.   Skin: Skin is warm.   Psychiatric: She has a normal mood and affect. Her behavior is normal.     Lab Review:     ECG 12 Lead  Date/Time: 12/22/2017 9:41 AM  Performed by: HENRY SNIDER  Authorized by: HENRY SNIDER   Comparison: not compared with previous ECG   Previous ECG: no previous ECG available  Rhythm: sinus rhythm  Rate: normal  BPM: 74  Clinical impression: non-specific ECG  Comments: Borderline R wave progression.          Assessment:       Diagnosis Plan   1. Palpitations  Adult Stress Echo W/ Cont or Stress Agent if Necessary Per Protocol    Holter Monitor - 72 Hour Up To 21 Days   2. Chest tightness  Adult Stress Echo W/ Cont or " Stress Agent if Necessary Per Protocol   3. Shortness of breath  Adult Stress Echo W/ Cont or Stress Agent if Necessary Per Protocol     Plan:       Based on the patient's description, I strongly suspect that she is having symptomatic PVCs.    Her Holter monitor showed only rare PVCs on 9/28/2017.  However, she has recently started   menopause, and recently has stopped her hormones.  This could certainly be playing a role   in causing ectopy.  However, I do feel that a longer-term monitor is indicated at this point,   and I am going to check a 14 day Zio Patch to further assess for the cause of her symptoms.    She has also had random chest tightness and shortness of breath.  I am going to check an   exercise stress echocardiogram at this point.  This will not only evaluate for ischemia, but   also for any potential structural heart disease. Further plans will be made pending the results   of the above testing.

## 2018-01-10 ENCOUNTER — HOSPITAL ENCOUNTER (OUTPATIENT)
Dept: CARDIOLOGY | Facility: HOSPITAL | Age: 58
Discharge: HOME | End: 2018-01-10
Attending: INTERNAL MEDICINE | Admitting: INTERNAL MEDICINE

## 2018-01-10 VITALS
HEIGHT: 65 IN | WEIGHT: 167 LBS | DIASTOLIC BLOOD PRESSURE: 72 MMHG | BODY MASS INDEX: 27.82 KG/M2 | SYSTOLIC BLOOD PRESSURE: 110 MMHG | HEART RATE: 91 BPM

## 2018-01-10 DIAGNOSIS — R06.02 SHORTNESS OF BREATH: ICD-10-CM

## 2018-01-10 DIAGNOSIS — R00.2 PALPITATIONS: ICD-10-CM

## 2018-01-10 DIAGNOSIS — R07.89 CHEST TIGHTNESS: ICD-10-CM

## 2018-01-10 LAB
ASCENDING AORTA: 2.4 CM
BH CV ECHO MEAS - ACS: 1.6 CM
BH CV ECHO MEAS - AO MAX PG: 9.4 MMHG
BH CV ECHO MEAS - AO ROOT AREA (BSA CORRECTED): 1.4
BH CV ECHO MEAS - AO ROOT AREA: 4.9 CM^2
BH CV ECHO MEAS - AO ROOT DIAM: 2.5 CM
BH CV ECHO MEAS - AO V2 MAX: 153.4 CM/SEC
BH CV ECHO MEAS - BSA(HAYCOCK): 1.9 M^2
BH CV ECHO MEAS - BSA: 1.8 M^2
BH CV ECHO MEAS - BZI_BMI: 27.8 KILOGRAMS/M^2
BH CV ECHO MEAS - BZI_METRIC_HEIGHT: 165 CM
BH CV ECHO MEAS - BZI_METRIC_WEIGHT: 75.8 KG
BH CV ECHO MEAS - CONTRAST EF 4CH: 76.9 ML/M^2
BH CV ECHO MEAS - EDV(MOD-SP4): 104 ML
BH CV ECHO MEAS - EDV(TEICH): 105.5 ML
BH CV ECHO MEAS - EF(CUBED): 76.6 %
BH CV ECHO MEAS - EF(MOD-SP4): 58 %
BH CV ECHO MEAS - EF(TEICH): 68.5 %
BH CV ECHO MEAS - ESV(MOD-SP4): 24 ML
BH CV ECHO MEAS - ESV(TEICH): 33.2 ML
BH CV ECHO MEAS - FS: 38.3 %
BH CV ECHO MEAS - IVS/LVPW: 1
BH CV ECHO MEAS - IVSD: 0.79 CM
BH CV ECHO MEAS - LAT PEAK E' VEL: 11 CM/SEC
BH CV ECHO MEAS - LV DIASTOLIC VOL/BSA (35-75): 56.8 ML/M^2
BH CV ECHO MEAS - LV MASS(C)D: 122.4 GRAMS
BH CV ECHO MEAS - LV MASS(C)DI: 66.8 GRAMS/M^2
BH CV ECHO MEAS - LV SYSTOLIC VOL/BSA (12-30): 13.1 ML/M^2
BH CV ECHO MEAS - LVIDD: 4.8 CM
BH CV ECHO MEAS - LVIDS: 2.9 CM
BH CV ECHO MEAS - LVLD AP4: 7.2 CM
BH CV ECHO MEAS - LVLS AP4: 5.7 CM
BH CV ECHO MEAS - LVOT AREA (M): 2 CM^2
BH CV ECHO MEAS - LVOT AREA: 2.1 CM^2
BH CV ECHO MEAS - LVOT DIAM: 1.6 CM
BH CV ECHO MEAS - LVPWD: 0.79 CM
BH CV ECHO MEAS - MED PEAK E' VEL: 13 CM/SEC
BH CV ECHO MEAS - MV A DUR: 0.12 SEC
BH CV ECHO MEAS - MV A MAX VEL: 106.7 CM/SEC
BH CV ECHO MEAS - MV DEC SLOPE: 720.4 CM/SEC^2
BH CV ECHO MEAS - MV DEC TIME: 0.17 SEC
BH CV ECHO MEAS - MV E MAX VEL: 121 CM/SEC
BH CV ECHO MEAS - MV E/A: 1.1
BH CV ECHO MEAS - MV P1/2T MAX VEL: 123.2 CM/SEC
BH CV ECHO MEAS - MV P1/2T: 50.1 MSEC
BH CV ECHO MEAS - MVA P1/2T LCG: 1.8 CM^2
BH CV ECHO MEAS - MVA(P1/2T): 4.4 CM^2
BH CV ECHO MEAS - PULM A REVS DUR: 0.11 SEC
BH CV ECHO MEAS - PULM A REVS VEL: 40 CM/SEC
BH CV ECHO MEAS - PULM DIAS VEL: 41.4 CM/SEC
BH CV ECHO MEAS - PULM S/D: 1.4
BH CV ECHO MEAS - PULM SYS VEL: 57.6 CM/SEC
BH CV ECHO MEAS - RAP SYSTOLE: 3 MMHG
BH CV ECHO MEAS - RVSP: 27 MMHG
BH CV ECHO MEAS - SI(CUBED): 45.1 ML/M^2
BH CV ECHO MEAS - SI(MOD-SP4): 43.7 ML/M^2
BH CV ECHO MEAS - SI(TEICH): 39.5 ML/M^2
BH CV ECHO MEAS - SUP REN AO DIAM: 1.4 CM
BH CV ECHO MEAS - SV(CUBED): 82.6 ML
BH CV ECHO MEAS - SV(MOD-SP4): 80 ML
BH CV ECHO MEAS - SV(TEICH): 72.3 ML
BH CV ECHO MEAS - TAPSE (>1.6): 2.9 CM2
BH CV ECHO MEAS - TR MAX VEL: 244.3 CM/SEC
BH CV STRESS BP STAGE 1: NORMAL
BH CV STRESS BP STAGE 2: NORMAL
BH CV STRESS DURATION MIN STAGE 1: 3
BH CV STRESS DURATION MIN STAGE 2: 2
BH CV STRESS DURATION SEC STAGE 1: 0
BH CV STRESS DURATION SEC STAGE 2: 0
BH CV STRESS ECHO POST STRESS EJECTION FRACTION EF: 77 %
BH CV STRESS GRADE STAGE 1: 10
BH CV STRESS GRADE STAGE 2: 12
BH CV STRESS HR STAGE 1: 153
BH CV STRESS HR STAGE 2: 163
BH CV STRESS METS STAGE 1: 5
BH CV STRESS METS STAGE 2: 7.5
BH CV STRESS PROTOCOL 1: NORMAL
BH CV STRESS SPEED STAGE 1: 1.7
BH CV STRESS SPEED STAGE 2: 2.5
BH CV STRESS STAGE 1: 1
BH CV STRESS STAGE 2: 2
BH CV XLRA - RV BASE: 2.7 CM
BH CV XLRA - TDI S': 14 CM/SEC
E/E' RATIO: 10
LEFT ATRIUM VOLUME INDEX: 26 ML/M2
MAXIMAL PREDICTED HEART RATE: 163 BPM
PERCENT MAX PREDICTED HR: 100 %
SINUS: 2.5 CM
STJ: 2 CM
STRESS BASELINE BP: NORMAL MMHG
STRESS BASELINE HR: 91 BPM
STRESS PERCENT HR: 118 %
STRESS POST ESTIMATED WORKLOAD: 6 METS
STRESS POST EXERCISE DUR MIN: 5 MIN
STRESS POST EXERCISE DUR SEC: 0 SEC
STRESS POST PEAK BP: NORMAL MMHG
STRESS POST PEAK HR: 163 BPM
STRESS TARGET HR: 139 BPM

## 2018-01-10 PROCEDURE — 93350 STRESS TTE ONLY: CPT

## 2018-01-10 PROCEDURE — 93325 DOPPLER ECHO COLOR FLOW MAPG: CPT | Performed by: INTERNAL MEDICINE

## 2018-01-10 PROCEDURE — 93352 ADMIN ECG CONTRAST AGENT: CPT | Performed by: INTERNAL MEDICINE

## 2018-01-10 PROCEDURE — 25010000002 PERFLUTREN (DEFINITY) 8.476 MG IN SODIUM CHLORIDE 0.9 % 10 ML INJECTION: Performed by: INTERNAL MEDICINE

## 2018-01-10 PROCEDURE — 93320 DOPPLER ECHO COMPLETE: CPT

## 2018-01-10 PROCEDURE — 93320 DOPPLER ECHO COMPLETE: CPT | Performed by: INTERNAL MEDICINE

## 2018-01-10 PROCEDURE — 93350 STRESS TTE ONLY: CPT | Performed by: INTERNAL MEDICINE

## 2018-01-10 PROCEDURE — 93018 CV STRESS TEST I&R ONLY: CPT | Performed by: INTERNAL MEDICINE

## 2018-01-10 PROCEDURE — 93016 CV STRESS TEST SUPVJ ONLY: CPT | Performed by: INTERNAL MEDICINE

## 2018-01-10 PROCEDURE — 93325 DOPPLER ECHO COLOR FLOW MAPG: CPT

## 2018-01-10 PROCEDURE — 93017 CV STRESS TEST TRACING ONLY: CPT

## 2018-01-10 RX ADMIN — PERFLUTREN 3 ML: 6.52 INJECTION, SUSPENSION INTRAVENOUS at 09:53

## 2018-01-29 ENCOUNTER — OFFICE VISIT (OUTPATIENT)
Dept: INTERNAL MEDICINE | Facility: CLINIC | Age: 58
End: 2018-01-29

## 2018-01-29 VITALS
HEART RATE: 63 BPM | DIASTOLIC BLOOD PRESSURE: 66 MMHG | WEIGHT: 167 LBS | TEMPERATURE: 98.5 F | OXYGEN SATURATION: 97 % | BODY MASS INDEX: 27.82 KG/M2 | SYSTOLIC BLOOD PRESSURE: 102 MMHG | HEIGHT: 65 IN

## 2018-01-29 DIAGNOSIS — R35.0 FREQUENCY OF URINATION: Primary | ICD-10-CM

## 2018-01-29 DIAGNOSIS — R31.9 HEMATURIA, UNSPECIFIED TYPE: ICD-10-CM

## 2018-01-29 LAB
BILIRUB BLD-MCNC: NEGATIVE MG/DL
CLARITY, POC: CLEAR
COLOR UR: YELLOW
GLUCOSE UR STRIP-MCNC: NEGATIVE MG/DL
KETONES UR QL: NEGATIVE
LEUKOCYTE EST, POC: NEGATIVE
NITRITE UR-MCNC: NEGATIVE MG/ML
PH UR: 5.5 [PH] (ref 5–8)
PROT UR STRIP-MCNC: NEGATIVE MG/DL
RBC # UR STRIP: ABNORMAL /UL
SP GR UR: 1 (ref 1–1.03)
UROBILINOGEN UR QL: NORMAL

## 2018-01-29 PROCEDURE — 99213 OFFICE O/P EST LOW 20 MIN: CPT | Performed by: NURSE PRACTITIONER

## 2018-01-29 PROCEDURE — 81003 URINALYSIS AUTO W/O SCOPE: CPT | Performed by: NURSE PRACTITIONER

## 2018-01-29 RX ORDER — LIFITEGRAST 50 MG/ML
1 SOLUTION/ DROPS OPHTHALMIC 2 TIMES DAILY
COMMUNITY
Start: 2018-01-11

## 2018-01-29 NOTE — PROGRESS NOTES
Subjective   Rosalinda Boone is a 57 y.o. female. Patient is here today for   Chief Complaint   Patient presents with   • Urinary Tract Infection     Pt here to follow up on her UTI that urgent care discovered on 1/21/2018. Pt was told to follow up after finishing her macrobid.    .    History of Present Illness   C/o urinary frequency. She was seen at the urgent care of 1/21/18 and was diagnosed with a UTI. She finished her macrobid yesterday and states that most of her symptoms have resolved except for the frequency. This is the 4th UTI since September, so she was told to follow up to make sure the infection was clear.  She came off of her hormones in September.   She woke up 3 times last night to have to urinate. She has some pelvic pressure. She has seen urology in the past, but it was over 10 years ago.    The following portions of the patient's history were reviewed and updated as appropriate: allergies, current medications, past family history, past medical history, past social history, past surgical history and problem list.    Review of Systems   Constitutional: Negative.    Respiratory: Negative.    Cardiovascular: Negative.    Genitourinary: Positive for frequency and pelvic pain. Negative for dysuria and flank pain.       Objective   Vitals:    01/29/18 1148   BP: 102/66   Pulse: 63   Temp: 98.5 °F (36.9 °C)   SpO2: 97%     Results for orders placed or performed in visit on 01/29/18   POCT urinalysis dipstick, automated   Result Value Ref Range    Color Yellow Yellow, Straw, Dark Yellow, Ama    Clarity, UA Clear Clear    Glucose, UA Negative Negative, 1000 mg/dL (3+) mg/dL    Bilirubin Negative Negative    Ketones, UA Negative Negative    Specific Gravity  1.005 1.005 - 1.030    Blood, UA Small (A) Negative    pH, Urine 5.5 5.0 - 8.0    Protein, POC Negative Negative mg/dL    Urobilinogen, UA Normal Normal    Leukocytes Negative Negative    Nitrite, UA Negative Negative       Physical Exam    Constitutional: Vital signs are normal. She appears well-developed and well-nourished.   Cardiovascular: Normal rate, regular rhythm and normal heart sounds.    Pulmonary/Chest: Effort normal and breath sounds normal.   Abdominal: There is no CVA tenderness.   Skin: Skin is warm, dry and intact.   Psychiatric: She has a normal mood and affect. Her speech is normal and behavior is normal. Thought content normal.   Nursing note and vitals reviewed.      Assessment/Plan   Rosalinda was seen today for urinary tract infection.    Diagnoses and all orders for this visit:    Frequency of urination  -     Ambulatory Referral to Urology  -     POCT urinalysis dipstick, automated  -     Urine Culture - Urine, Urine, Clean Catch    Hematuria, unspecified type  -     Ambulatory Referral to Urology  -     POCT urinalysis dipstick, automated  -     Urine Culture - Urine, Urine, Clean Catch      No antibiotic needed at this time - will wait for culture results. Her culture from the urgent care was negative.

## 2018-01-31 LAB
BACTERIA UR CULT: NO GROWTH
BACTERIA UR CULT: NORMAL

## 2018-02-20 ENCOUNTER — OFFICE VISIT (OUTPATIENT)
Dept: CARDIOLOGY | Facility: CLINIC | Age: 58
End: 2018-02-20

## 2018-02-20 VITALS
SYSTOLIC BLOOD PRESSURE: 126 MMHG | OXYGEN SATURATION: 100 % | BODY MASS INDEX: 27.82 KG/M2 | DIASTOLIC BLOOD PRESSURE: 76 MMHG | HEART RATE: 102 BPM | HEIGHT: 65 IN | WEIGHT: 167 LBS

## 2018-02-20 DIAGNOSIS — R00.2 PALPITATIONS: ICD-10-CM

## 2018-02-20 DIAGNOSIS — I47.1 SVT (SUPRAVENTRICULAR TACHYCARDIA) (HCC): Primary | ICD-10-CM

## 2018-02-20 PROCEDURE — 99213 OFFICE O/P EST LOW 20 MIN: CPT | Performed by: INTERNAL MEDICINE

## 2018-02-20 RX ORDER — METHENAMINE, SODIUM PHOSPHATE, MONOBASIC, MONOHYDRATE, PHENYL SALICYLATE, METHYLENE BLUE, AND HYOSCYAMINE SULFATE 120; 40.8; 36; 10; .12 MG/1; MG/1; MG/1; MG/1; MG/1
CAPSULE ORAL
Refills: 12 | COMMUNITY
Start: 2018-02-09 | End: 2019-11-01

## 2018-03-04 NOTE — PROGRESS NOTES
"Date of Office Visit: 2018  Encounter Provider: Moo Samuels MD  Place of Service: [facility] CARDIOLOGY  Patient Name: Rosalinda Boone  :1960    Chief complaint: Follow-up for SVT, palpitations     History of Present Illness:    Dear Dr. Sosa:    I again had the pleasure of seeing your patient in cardiology office on 2018.  As you   well know, she is a very pleasant 57 year-old white female with a past medical history   significant for SVT, asthma, GERD, and gastritis who presents for follow-up.  I initially   saw the patient on 2017.  At that point, she had been having more frequent   palpitations which she described as a \"flipping\" sensation which occurred for a few   seconds and then resolved.  She would typically feels as if she needed to take a deep   breath or cough when these occurred.  She had recently started menopause, and had   recently gone off of hormone replacement therapy.  Her symptoms did correlate   somewhat with stopping the hormone therapy.  She had also had occasional chest   tightness and shortness of breath.  Her TSH had been checked in 2017 was  normal at 2.15.  She did wear a Holter monitor on 2017 which showed only rare   PVCs and was essentially normal as well.  She wore a 14 day Zio Patch starting on   2017.  This showed 33 brief runs of SVT, with the longest being 20 beats in   length at 153 bpm.  The fastest of these was 8 beats in length at 200 bpm.  She did   also undergo a stress echocardiogram on 1/10/2018 which showed no evidence of   ischemia, an ejection fraction of 60-65%, normal diastolic function, and no significant   valvular disease.  She was subsequently started on diltiazem at 30 mg twice a day   after the SVT was diagnosed.    The patient presents today for follow-up.  She recently had influenza, and was severely   deconditioned from this.  She is still recovering from the influenza infection.  " She has   had some episodes of her palpitations consisting of her heart racing very briefly.  She   has not noticed a significant difference since starting a diltiazem, although she states   that this may be also secondary to deconditioning from the flu.  Her shortness of breath   is the same.  She actually has not had any recent chest pain.    Past Medical History:   Diagnosis Date   • Asthma    • Gastritis    • GERD (gastroesophageal reflux disease)    • IBS (irritable bowel syndrome)    • Palpitations    • Right sided abdominal pain    • SOB (shortness of breath)    • SVT (supraventricular tachycardia)     14 day Zio Patch starting on 12/22/17: 33 brief runs of SVT (longest 20 beats in length at 153 bpm).   • Tingling        Past Surgical History:   Procedure Laterality Date   • COLONOSCOPY N/A 12/5/2016    [facility],    • D&C WITH SUCTION     • ENDOSCOPY N/A 12/5/2016    z line regular, 40 cm from incisors,  granular gastric mucosa, chronic gastritis   • LASIK         Current Outpatient Prescriptions on File Prior to Visit   Medication Sig Dispense Refill   • ASMANEX 60 METERED DOSES 220 MCG/INH inhaler Inhale 1 puff Daily.     • azelastine (ASTEPRO) 0.15 % solution nasal spray 1 spray into each nostril Daily.     • desloratadine (CLARINEX) 5 MG tablet      • diltiaZEM (CARDIZEM) 30 MG tablet Take 1 tablet by mouth 2 (Two) Times a Day. 60 tablet 11   • FLUTICASONE PROPIONATE, NASAL, NA 1 spray into each nostril Daily.     • Glucosamine-Chondroit-Vit C-Mn (GLUCOSAMINE CHONDR 1500 COMPLX PO) Take 2 tablets by mouth daily.     • lansoprazole (PREVACID) 30 MG capsule Take 30 mg by mouth Daily.     • montelukast (SINGULAIR) 10 MG tablet Take 10 mg by mouth Every Night.     • Multiple Vitamin (MULTI VITAMIN DAILY PO) Take 1 tablet by mouth daily.     • PROAIR  (90 BASE) MCG/ACT inhaler      • Unable to find 1 each 1 (One) Time. IB GUARD     • Wheat Dextrin (BENEFIBER) powder Take 1 application by mouth Daily.     •  "XIIDRA 5 % solution Administer 1 drop to both eyes 2 (Two) Times a Day.       No current facility-administered medications on file prior to visit.      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     Social History Main Topics   • Smoking status: Never Smoker   • Smokeless tobacco: Never Used   • Alcohol use 0.6 - 1.2 oz/week     1 - 2 Glasses of wine per week      Comment: daily   • Drug use: No   • Sexual activity: Yes     Partners: Male     Other Topics Concern   • Not on file     Social History Narrative    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   Cardiovascular: Positive for dyspnea on exertion and palpitations.   Neurological: Positive for light-headedness.   All other systems reviewed and are negative.    Objective:     Vitals:    02/20/18 1509   BP: 126/76   BP Location: Left arm   Pulse: 102   SpO2: 100%   Weight: 75.8 kg (167 lb)   Height: 165.1 cm (65\")     Body mass index is 27.79 kg/(m^2).    Physical Exam  Lab Review:   Procedures    Cardiac Procedures:  1. 14 day Zio Patch starting on 12/22/17: 33 brief runs of SVT, with the longest being   20 beats in length at 153 bpm.  The fastest was 8 beats at 200 bpm.    2. Stress " echocardiogram on 1/10/18: There was no evidence of ischemia.  The   ejection fraction was 60-65%.  Diastolic function was normal.  The right ventricle was   normal.  There was no significant valvular disease.    Assessment:       Diagnosis Plan   1. SVT (supraventricular tachycardia)  Basic Metabolic Panel   2. Palpitations       Plan:       Again, the patient has been recovering from an influenza infection recently, and she has   been significantly deconditioned.  She is unclear as to whether the diltiazem at 30 mg   twice a day has made any difference in her palpitations.  She still has experienced   episodes of her heart racing briefly recently.  She has tolerated the diltiazem thus far,   and has not had any issues with taking it.  Her stress echocardiogram was essentially   normal.  For now, I am going to keep her on the diltiazem at 30 mg twice a day.  I will   see if she responds to this over the next several months.  If she has increasing   palpitations or no relief, she will notify me.  Again, these were very brief runs of SVT,   and I do not feel that an ablation is necessary.  I will have her follow-up with me in 3   months unless other issues arise.

## 2018-03-12 RX ORDER — LANSOPRAZOLE 30 MG/1
CAPSULE, DELAYED RELEASE ORAL
Qty: 90 CAPSULE | Refills: 3 | OUTPATIENT
Start: 2018-03-12

## 2018-03-16 ENCOUNTER — OFFICE VISIT (OUTPATIENT)
Dept: OBSTETRICS AND GYNECOLOGY | Facility: CLINIC | Age: 58
End: 2018-03-16

## 2018-03-16 VITALS
DIASTOLIC BLOOD PRESSURE: 80 MMHG | HEIGHT: 65 IN | BODY MASS INDEX: 27.66 KG/M2 | WEIGHT: 166 LBS | SYSTOLIC BLOOD PRESSURE: 120 MMHG

## 2018-03-16 DIAGNOSIS — N95.1 MENOPAUSAL SYMPTOMS: Primary | ICD-10-CM

## 2018-03-16 PROCEDURE — 99214 OFFICE O/P EST MOD 30 MIN: CPT | Performed by: NURSE PRACTITIONER

## 2018-03-16 RX ORDER — ONDANSETRON 8 MG/1
TABLET, ORALLY DISINTEGRATING ORAL
Refills: 0 | COMMUNITY
Start: 2018-01-17 | End: 2018-03-30

## 2018-03-16 NOTE — PROGRESS NOTES
"Subjective     Chief Complaint   Patient presents with   • Follow-up       Rosalinda Boone is a 57 y.o.  whose LMP is No LMP recorded. Patient is postmenopausal. She presents for hormone replacement therapy. She was taking HRT up until just a few months ago when she weaned herself off of hormones. She was doing ok but over the last month or so she has been experiencing some unpleasant symptoms which she thinks are hormonal related. She is very unhappy with how she feels.   She reports that she has episodes of hot flashes, anxiety, panic attacks, and she feels bad. She states she does not feel like her self at all. States the panic attacks are severe when they occur. She has been working with her PCP and cardiologist to rule out other causes of her symptoms. She desires to disc treatment for menopausal symptoms. She does not necessarily want to go on HRT again. She is open to alternative options.       HPI    HPI     The following portions of the patient's history were reviewed and updated as appropriate:vital signs, allergies, current medications, past medical history, past social history, past surgical history and problem list      Review of Systems     Review of Systems   Constitutional: Negative.    Respiratory: Negative.    Cardiovascular: Negative.    Gastrointestinal: Negative.    Endocrine:        Hot flashes    Genitourinary: Negative.    Neurological: Negative.    Psychiatric/Behavioral: The patient is nervous/anxious.         Panic attack       Objective      /80   Ht 165.1 cm (65\")   Wt 75.3 kg (166 lb)   BMI 27.62 kg/m²     Physical Exam    Physical Exam   Constitutional: She is oriented to person, place, and time. She appears well-developed and well-nourished.   Musculoskeletal: Normal range of motion.   Neurological: She is alert and oriented to person, place, and time.   Skin: Skin is warm and dry.   Psychiatric: She has a normal mood and affect. Her behavior is normal.   Vitals " reviewed.      Lab Review   Labs: No data reviewed     Imaging   No data reviewed    Assessment  There are no diagnoses linked to this encounter.    Additional Assessment:   1) Postmenopausal symptoms        Plan     1.  Postmenopausal symptoms:  Patient was counseled at length regarding hormone replacement therapy. Combined hormone therapy is linked to a small increased risk of heart attack. This risk may be related to age, existing medical conditions, and when a woman starts taking hormone therapy. Some research suggests that for women who start combined therapy within 10 years of menopause and who are younger than 60 years, combined therapy may protect against heart attacks. However, combined hormone therapy should not be used solely to protect against heart disease. Combined hormone therapy and estrogen-only therapy are associated with a small increased risk of stroke and deep vein thrombosis (DVT). Forms of therapy not taken by mouth (patches, sprays, rings, and others) have less risk of causing DVT than those taken by mouth. Combined hormone therapy is associated with a small increased risk of breast cancer. Currently, it is recommended that women with a history of hormone-sensitive breast cancer try nonhormonal therapies first for the treatment of menopausal symptoms. There is a small increased risk of gallbladder disease associated with estrogen therapy with or without progestin. The risk is greatest with forms of therapy taken by mouth. Oral contraceptives, but not HRT, are contraindicated in elderly smokers. However, the principal conclusion of the WHI study was that the lowest dose possible should be chosen, especially in patients with an increased cardiovascular risk, as is the case in smokers. The patient understands the risk of HRT and understands that the least amount of dosage for the least amount of time is recommended. We also disc at length and information was provided regarding alternative  therapy for  symptoms including herbal OTC preparations and antidepressants. When menopausal women cannot or do not want to take estrogen to  symptoms, antidepressants may serve as an effective alternative. Although estrogen has long been the gold standard for treating  symptoms, some women are unable or unwilling to use it because of associated risks. Consequently, SSRI or SNRI antidepressants are often prescribed as a treatment for these vasomotor symptoms. Literature regarding options of herbal supplements were provided as well. Enc healthy and active lifestyle as well to decrease  symptoms. Pt to read literature and disc further with her PCP and cardiologist and decide how she wants to pursue treatment.     2.  Pap:  NIL 9/17    3.  Smoking status: non smoker     4.  Annual Exam scheduled for: 9/18    Counseling was given to patient for the following topics: instructions for management, risk factor reductions, prognosis and patient and family education . Total time of the encounter was 30 minutes and 30 minutes was spend counseling.  Nicolle Lubin, ANOOP  3/29/2018

## 2018-03-30 ENCOUNTER — OFFICE VISIT (OUTPATIENT)
Dept: GASTROENTEROLOGY | Facility: CLINIC | Age: 58
End: 2018-03-30

## 2018-03-30 VITALS
TEMPERATURE: 98.2 F | WEIGHT: 167 LBS | SYSTOLIC BLOOD PRESSURE: 110 MMHG | BODY MASS INDEX: 27.82 KG/M2 | HEIGHT: 65 IN | DIASTOLIC BLOOD PRESSURE: 78 MMHG

## 2018-03-30 DIAGNOSIS — K21.9 GASTROESOPHAGEAL REFLUX DISEASE WITHOUT ESOPHAGITIS: Primary | ICD-10-CM

## 2018-03-30 DIAGNOSIS — R19.7 DIARRHEA, UNSPECIFIED TYPE: ICD-10-CM

## 2018-03-30 DIAGNOSIS — R14.0 ABDOMINAL BLOATING: ICD-10-CM

## 2018-03-30 DIAGNOSIS — R14.3 EXCESSIVE GAS: ICD-10-CM

## 2018-03-30 PROCEDURE — 99214 OFFICE O/P EST MOD 30 MIN: CPT | Performed by: NURSE PRACTITIONER

## 2018-03-30 RX ORDER — TURMERIC ROOT EXTRACT 500 MG
500 TABLET ORAL DAILY
COMMUNITY
End: 2021-11-08

## 2018-03-30 RX ORDER — LANSOPRAZOLE 30 MG/1
30 CAPSULE, DELAYED RELEASE ORAL DAILY
Qty: 90 CAPSULE | Refills: 3 | Status: SHIPPED | OUTPATIENT
Start: 2018-03-30 | End: 2019-04-17 | Stop reason: SDUPTHER

## 2018-03-30 NOTE — PROGRESS NOTES
Chief Complaint   Patient presents with   • Med Refill     Lansoprazole       Rosalinda Boone is a  58 y.o. female here for a follow up visit for GERD.     HPI  59 yo f presents today for office follow up visit for GERD. She is a patient of Dr. Real. She was last seen in the office on 3/28/2017. She has hx of GERD and has been doing very well on prevacid 30 mg daily. Last EGD was done on 12/2016 and it showed chronic gastritis. Pathology was negative.   She admits recently she has been on 4 rounds of ABX from an ingrown toe nail and recurrent UTI. She admits the last ABX was clindamycin and it has worked to get rid of her infection but completely left her with diarrhea, nausea, gas and bloating. She started taking a probiotic with fiber supplementation for the last 2 weeks and she has seen such an improvement in her symptoms. She reports now her bowels are firmer and more normal for her and the gas and bloating is almost gone. She denies any dysphagia, reflux, abd pain, constipation, diarrhea, rectal bleeding or melena. She admits her appetite is good and her weight is stable.   Last colonoscopy was 12/2016 and it showed NBIH otherwise normal.   Past Medical History:   Diagnosis Date   • Asthma    • Gastritis    • GERD (gastroesophageal reflux disease)    • IBS (irritable bowel syndrome)    • Palpitations    • Right sided abdominal pain    • SOB (shortness of breath)    • SVT (supraventricular tachycardia)     14 day Zio Patch starting on 12/22/17: 33 brief runs of SVT (longest 20 beats in length at 153 bpm).   • Tingling        Past Surgical History:   Procedure Laterality Date   • COLONOSCOPY N/A 12/5/2016    NBIH,    • D&C WITH SUCTION     • ENDOSCOPY N/A 12/5/2016    z line regular, 40 cm from incisors,  granular gastric mucosa, chronic gastritis   • LASIK         Scheduled Meds:    Continuous Infusions:  No current facility-administered medications for this visit.     PRN Meds:.    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     Social History Main Topics   • Smoking status: Never Smoker   • Smokeless tobacco: Never Used   • Alcohol use 0.6 - 1.2 oz/week     1 - 2 Glasses of wine per week      Comment: daily   • Drug use: No   • Sexual activity: Yes     Partners: Male     Other Topics Concern   • Not on file     Social History Narrative    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   HENT: Negative for nosebleeds, postnasal drip, sore throat, trouble swallowing and voice change.    Respiratory: Negative for cough, choking, chest tightness, shortness of breath and wheezing.    Cardiovascular: Negative for chest pain.   Gastrointestinal: Negative for abdominal distention, abdominal pain, anal bleeding, blood in stool, constipation, diarrhea, nausea, rectal pain and vomiting.   Endocrine: Negative for polydipsia, polyphagia and polyuria.   Musculoskeletal: Negative for gait problem.   Skin: Negative for rash and wound.   Allergic/Immunologic: Negative for food allergies.   Neurological: Negative for dizziness, speech difficulty and light-headedness.   Psychiatric/Behavioral: Negative for confusion, self-injury,  sleep disturbance and suicidal ideas.       Vitals:    03/30/18 1105   BP: 110/78   Temp: 98.2 °F (36.8 °C)       Physical Exam   Constitutional: She is oriented to person, place, and time. She appears well-developed and well-nourished. She does not appear ill. No distress.   HENT:   Head: Normocephalic.   Eyes: Pupils are equal, round, and reactive to light.   Cardiovascular: Normal rate, regular rhythm and normal heart sounds.    Pulmonary/Chest: Effort normal and breath sounds normal.   Abdominal: Soft. Bowel sounds are normal. She exhibits no distension and no mass. There is no hepatosplenomegaly. There is no tenderness. There is no rebound and no guarding. No hernia.   Musculoskeletal: Normal range of motion.   Neurological: She is alert and oriented to person, place, and time.   Skin: Skin is warm and dry.   Psychiatric: She has a normal mood and affect. Her speech is normal and behavior is normal. Judgment normal.       No images are attached to the encounter.    Assessment & Plan     1. Gastroesophageal reflux disease without esophagitis  - lansoprazole (PREVACID) 30 MG capsule; Take 1 capsule by mouth Daily.  Dispense: 90 capsule; Refill: 3    2. Abdominal bloating    3. Diarrhea, unspecified type    4. Excessive gas      GERD is well controlled on PPI. I will refill x 1 year. I spent more than 50% of the visit (20 mins) discussing bacterial overgrowth and how best to take care of yourself during and post antibiotics. Follow up with Dr. Real in 1 year or sooner as needed.

## 2018-05-25 ENCOUNTER — TELEPHONE (OUTPATIENT)
Dept: INTERNAL MEDICINE | Facility: CLINIC | Age: 58
End: 2018-05-25

## 2018-05-25 RX ORDER — FLUCONAZOLE 150 MG/1
150 TABLET ORAL ONCE
Qty: 1 TABLET | Refills: 1 | Status: SHIPPED | OUTPATIENT
Start: 2018-05-25 | End: 2018-05-25

## 2018-05-25 NOTE — TELEPHONE ENCOUNTER
RX SENT TO PHARMACY    ----- Message from Shraddha Sosa MD sent at 5/25/2018  9:25 AM EDT -----  Regarding: RE: yeast infection   Contact: [phone]  Ok to diflucan 150mg x 1 with a refill  ----- Message -----  From: Chiquita Carvalho MA  Sent: 5/25/2018   8:59 AM  To: Shraddha Sosa MD  Subject: FW: yeast infection                                  ----- Message -----  From: Libertad Olson  Sent: 5/25/2018   8:25 AM  To: Chiquita Carvalho MA  Subject: yeast infection                                  Patient called and said she just finished an antibiotic and now has a yeast infection. She would like to have something called in.

## 2018-06-13 ENCOUNTER — OFFICE VISIT (OUTPATIENT)
Dept: CARDIOLOGY | Facility: CLINIC | Age: 58
End: 2018-06-13

## 2018-06-13 VITALS
BODY MASS INDEX: 27.32 KG/M2 | SYSTOLIC BLOOD PRESSURE: 112 MMHG | HEART RATE: 78 BPM | OXYGEN SATURATION: 98 % | WEIGHT: 164 LBS | DIASTOLIC BLOOD PRESSURE: 70 MMHG | HEIGHT: 65 IN

## 2018-06-13 DIAGNOSIS — I47.1 SVT (SUPRAVENTRICULAR TACHYCARDIA) (HCC): ICD-10-CM

## 2018-06-13 DIAGNOSIS — R00.2 PALPITATIONS: Primary | ICD-10-CM

## 2018-06-13 PROCEDURE — 99214 OFFICE O/P EST MOD 30 MIN: CPT | Performed by: INTERNAL MEDICINE

## 2018-06-15 DIAGNOSIS — R00.2 PALPITATIONS: Primary | ICD-10-CM

## 2018-06-15 DIAGNOSIS — I47.1 SVT (SUPRAVENTRICULAR TACHYCARDIA) (HCC): ICD-10-CM

## 2018-06-27 NOTE — PROGRESS NOTES
"Date of Office Visit: 2018  Encounter Provider: Moo Samuels MD  Place of Service: [facility] CARDIOLOGY  Patient Name: Rosalinda Boone  :1960    Chief complaint: Follow-up for SVT, palpitations.    History of Present Illness:    Dear Dr. Sosa:     I again had the pleasure of seeing your patient in cardiology office on 2018.  As you   well know, she is a very pleasant 58 year-old white female with a past medical history   significant for SVT, asthma, GERD, and gastritis who presents for follow-up.  I initially   saw the patient on 2017.  At that point, she had been having more frequent   palpitations which she described as a \"flipping\" sensation which occurred for a few   seconds and then resolved.  She would typically feels as if she needed to take a deep   breath or cough when these occurred.  She had recently started menopause, and had   recently gone off of hormone replacement therapy.  Her symptoms did correlate   somewhat with stopping the hormone therapy.  She had also had occasional chest   tightness and shortness of breath.  Her TSH had been checked in 2017 was  normal at 2.15.  She did wear a Holter monitor on 2017 which showed only rare   PVCs and was essentially normal as well.  She wore a 14 day Zio Patch starting on   2017.  This showed 33 brief runs of SVT, with the longest being 20 beats in   length at 153 bpm.  The fastest of these was 8 beats in length at 200 bpm.  She did   also undergo a stress echocardiogram on 1/10/2018 which showed no evidence of   ischemia, an ejection fraction of 60-65%, normal diastolic function, and no significant   valvular disease.  She was subsequently started on diltiazem at 30 mg twice a day   after the SVT was diagnosed.     The patient presents today for follow-up.  The patient has been having issues with   palpitations again.  She states that she feels her heart going \"fast and hard\" at " random   times.  She states that these typically last for 1 minute or less, and are different from   the previous fluttering sensations that she was experiencing.  She is concerned about   this and wonders if this could be from a different arrhythmia.  She has not had any   significant chest discomfort other than when her heart is racing.  She has had some   shortness of breath from her asthma.    Past Medical History:   Diagnosis Date   • Asthma    • Gastritis    • GERD (gastroesophageal reflux disease)    • IBS (irritable bowel syndrome)    • Palpitations    • Right sided abdominal pain    • SOB (shortness of breath)    • SVT (supraventricular tachycardia)     14 day Zio Patch starting on 12/22/17: 33 brief runs of SVT (longest 20 beats in length at 153 bpm).   • Tingling        Past Surgical History:   Procedure Laterality Date   • COLONOSCOPY N/A 12/5/2016    [facility],    • D&C WITH SUCTION     • ENDOSCOPY N/A 12/5/2016    z line regular, 40 cm from incisors,  granular gastric mucosa, chronic gastritis   • LASIK         Current Outpatient Prescriptions on File Prior to Visit   Medication Sig Dispense Refill   • ASMANEX 60 METERED DOSES 220 MCG/INH inhaler Inhale 1 puff Daily.     • azelastine (ASTEPRO) 0.15 % solution nasal spray 1 spray into each nostril Daily.     • BIOTIN PO Take  by mouth Daily.     • desloratadine (CLARINEX) 5 MG tablet      • FLUTICASONE PROPIONATE, NASAL, NA 1 spray into each nostril Daily.     • Glucosamine-Chondroit-Vit C-Mn (GLUCOSAMINE CHONDR 1500 COMPLX PO) Take 2 tablets by mouth daily.     • lansoprazole (PREVACID) 30 MG capsule Take 1 capsule by mouth Daily. 90 capsule 3   • montelukast (SINGULAIR) 10 MG tablet Take 10 mg by mouth Every Night.     • Multiple Vitamin (MULTI VITAMIN DAILY PO) Take 1 tablet by mouth daily.     • PROAIR  (90 BASE) MCG/ACT inhaler      • Turmeric 500 MG tablet Take 500 mg by mouth Daily.     • Unable to find 1 each 1 (One) Time. IB GUARD     •  "uribel (URO-MP) 118 MG capsule capsule TK 1 C PO  TID PRN  12   • Wheat Dextrin (BENEFIBER) powder Take 1 application by mouth Daily.     • XIIDRA 5 % solution Administer 1 drop to both eyes 2 (Two) Times a Day.       No current facility-administered medications on file prior to visit.      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     Social History Main Topics   • Smoking status: Never Smoker   • Smokeless tobacco: Never Used   • Alcohol use 0.6 - 1.2 oz/week     1 - 2 Glasses of wine per week      Comment: daily   • Drug use: No   • Sexual activity: Yes     Partners: Male     Other Topics Concern   • Not on file     Social History Narrative    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   All other systems reviewed and are negative.     Objective:     Vitals:    06/13/18 1422   BP: 112/70   Pulse: 78   SpO2: 98%   Weight: 74.4 kg (164 lb)   Height: 165.1 cm (65\")     Body mass index is 27.29 kg/m².    Physical Exam   Constitutional: She is oriented to person, place, and time. She appears well-developed and well-nourished.   HENT:   Head: Normocephalic and atraumatic. "   Eyes: Conjunctivae are normal.   Neck: Neck supple.   Cardiovascular: Normal rate and regular rhythm.  Exam reveals no gallop and no friction rub.    No murmur heard.  Pulmonary/Chest: Effort normal and breath sounds normal.   Abdominal: Soft. There is no tenderness.   Musculoskeletal: She exhibits no edema.   Neurological: She is alert and oriented to person, place, and time.   Skin: Skin is warm.   Psychiatric: She has a normal mood and affect. Her behavior is normal.     Lab Review:   Procedures    Cardiac Procedures:  1. 14 day Zio Patch starting on 12/22/17: 33 brief runs of SVT, with the longest being   20 beats in length at 153 bpm.  The fastest was 8 beats at 200 bpm.    2. Stress echocardiogram on 1/10/18: There was no evidence of ischemia.  The   ejection fraction was 60-65%.  Diastolic function was normal.  The right ventricle was   normal.  There was no significant valvular disease.    Assessment:       Diagnosis Plan   1. Palpitations  Holter Monitor - 24 Hour   2. SVT (supraventricular tachycardia)  Holter Monitor - 24 Hour     Plan:       Again, the patient feels that the palpitations she is currently experiencing are different than   before.  She does feel that her heart races and beats hard for a minute or less.  However,   she is very concerned that she may be having another type of arrhythmia.  I agree that   further investigation is needed at this point.  I am going to start again with a 24-hour Holter   monitor.  If she continues to have palpitations in the future, and no diagnosis is ascertained,   I would likely proceed again with an event recorder.  She is going to remain on the diltiazem   at 30 mg twice a day for now.  I will schedule her 6 month follow-up, and I will make further   plans after the results of her monitor are known.

## 2018-10-11 ENCOUNTER — OFFICE VISIT (OUTPATIENT)
Dept: INTERNAL MEDICINE | Facility: CLINIC | Age: 58
End: 2018-10-11

## 2018-10-11 ENCOUNTER — TELEPHONE (OUTPATIENT)
Dept: INTERNAL MEDICINE | Facility: CLINIC | Age: 58
End: 2018-10-11

## 2018-10-11 VITALS
WEIGHT: 166.6 LBS | HEART RATE: 84 BPM | SYSTOLIC BLOOD PRESSURE: 108 MMHG | HEIGHT: 65 IN | TEMPERATURE: 98.1 F | DIASTOLIC BLOOD PRESSURE: 66 MMHG | BODY MASS INDEX: 27.76 KG/M2 | OXYGEN SATURATION: 100 %

## 2018-10-11 DIAGNOSIS — M25.552 LEFT HIP PAIN: ICD-10-CM

## 2018-10-11 DIAGNOSIS — M47.896 OTHER OSTEOARTHRITIS OF SPINE, LUMBAR REGION: Primary | ICD-10-CM

## 2018-10-11 PROCEDURE — 99213 OFFICE O/P EST LOW 20 MIN: CPT | Performed by: INTERNAL MEDICINE

## 2018-10-11 RX ORDER — MELOXICAM 7.5 MG/1
7.5 TABLET ORAL DAILY
Qty: 30 TABLET | Refills: 1 | Status: SHIPPED | OUTPATIENT
Start: 2018-10-11 | End: 2018-10-11 | Stop reason: SDUPTHER

## 2018-10-11 RX ORDER — MELOXICAM 7.5 MG/1
7.5 TABLET ORAL DAILY
Qty: 30 TABLET | Refills: 1 | Status: SHIPPED | OUTPATIENT
Start: 2018-10-11 | End: 2019-06-19

## 2018-10-11 NOTE — PROGRESS NOTES
Subjective   Rosalinda Boone is a 58 y.o. female here to discuss the issue on her back pain/hip pain that radiates down to knee and discuss abt cortisone inj and MRI.    History of Present Illness   She has been pain on the left hip radiating down the LLE  She has been seeing a chiro   She has been having popping in the left hip which causes weakness-      The following portions of the patient's history were reviewed and updated as appropriate: allergies, current medications, past medical history, past social history and problem list.    Review of Systems    Objective   Physical Exam   Constitutional: She is oriented to person, place, and time. She appears well-developed and well-nourished.   HENT:   Head: Normocephalic and atraumatic.   Right Ear: External ear normal.   Left Ear: External ear normal.   Mouth/Throat: Oropharynx is clear and moist.   Eyes: Pupils are equal, round, and reactive to light. Conjunctivae and EOM are normal.   Neck: Normal range of motion. No tracheal deviation present. No thyromegaly present.   Cardiovascular: Normal rate, regular rhythm, normal heart sounds and intact distal pulses.    Pulmonary/Chest: Effort normal and breath sounds normal.   Abdominal: Soft. Bowel sounds are normal. She exhibits no distension. There is no tenderness.   Musculoskeletal: Normal range of motion. She exhibits no edema or deformity.   Neurological: She is alert and oriented to person, place, and time.   Skin: Skin is warm and dry.   Psychiatric: She has a normal mood and affect. Her behavior is normal. Judgment and thought content normal.   Vitals reviewed.      Vitals:    10/11/18 0926   BP: 108/66   Pulse: 84   Temp: 98.1 °F (36.7 °C)   SpO2: 100%     Current Outpatient Prescriptions:   •  ASMANEX 60 METERED DOSES 220 MCG/INH inhaler, Inhale 1 puff Daily., Disp: , Rfl:   •  azelastine (ASTEPRO) 0.15 % solution nasal spray, 1 spray into each nostril Daily., Disp: , Rfl:   •  BIOTIN PO, Take  by mouth Daily.,  Disp: , Rfl:   •  Ca Carbonate-Mag Hydroxide (ROLAIDS PO), Take  by mouth., Disp: , Rfl:   •  desloratadine (CLARINEX) 5 MG tablet, , Disp: , Rfl:   •  diltiaZEM (CARDIZEM) 30 MG tablet, TAKE 1 TABLET TWICE A DAY, Disp: 180 tablet, Rfl: 3  •  FLUTICASONE PROPIONATE, NASAL, NA, 1 spray into each nostril Daily., Disp: , Rfl:   •  Glucosamine-Chondroit-Vit C-Mn (GLUCOSAMINE CHONDR 1500 COMPLX PO), Take 2 tablets by mouth daily., Disp: , Rfl:   •  lansoprazole (PREVACID) 30 MG capsule, Take 1 capsule by mouth Daily., Disp: 90 capsule, Rfl: 3  •  montelukast (SINGULAIR) 10 MG tablet, Take 10 mg by mouth Every Night., Disp: , Rfl:   •  Multiple Vitamin (MULTI VITAMIN DAILY PO), Take 1 tablet by mouth daily., Disp: , Rfl:   •  PROAIR  (90 BASE) MCG/ACT inhaler, , Disp: , Rfl:   •  Turmeric 500 MG tablet, Take 500 mg by mouth Daily., Disp: , Rfl:   •  Unable to find, 1 each 1 (One) Time. IB GUARD, Disp: , Rfl:   •  uribel (URO-MP) 118 MG capsule capsule, TK 1 C PO  TID PRN, Disp: , Rfl: 12  •  Wheat Dextrin (BENEFIBER) powder, Take 1 application by mouth Daily., Disp: , Rfl:   •  XIIDRA 5 % solution, Administer 1 drop to both eyes 2 (Two) Times a Day., Disp: , Rfl:          Assessment/Plan   Diagnoses and all orders for this visit:    Other osteoarthritis of spine, lumbar region  -     Ambulatory Referral to Orthopedic Surgery    Left hip pain  -     Ambulatory Referral to Orthopedic Surgery        1. Left hip pain- some help with PT and chiro but now that is not working  She does take occas tylenol occas with no relief we will try carol ann

## 2018-10-11 NOTE — TELEPHONE ENCOUNTER
CALLED [facility] AND CANCELLED [facility].    ----- Message from Shraddha Sosa MD sent at 10/11/2018  9:52 AM EDT -----   [facility]

## 2018-10-30 ENCOUNTER — OFFICE VISIT (OUTPATIENT)
Dept: ORTHOPEDIC SURGERY | Facility: CLINIC | Age: 58
End: 2018-10-30

## 2018-10-30 VITALS — WEIGHT: 161 LBS | HEIGHT: 65 IN | BODY MASS INDEX: 26.82 KG/M2

## 2018-10-30 DIAGNOSIS — M25.552 HIP PAIN, LEFT: Primary | ICD-10-CM

## 2018-10-30 PROCEDURE — 99214 OFFICE O/P EST MOD 30 MIN: CPT | Performed by: NURSE PRACTITIONER

## 2018-10-30 PROCEDURE — 73502 X-RAY EXAM HIP UNI 2-3 VIEWS: CPT | Performed by: NURSE PRACTITIONER

## 2018-10-30 NOTE — PROGRESS NOTES
"Patient: Rosalinda Boone  YOB: 1960 58 y.o. female  Medical Record Number: [id number]    Chief Complaints:   Chief Complaint   Patient presents with   • Left Hip - Pain, Establish Care       History of Present Illness:Rosalinda Boone is a 58 y.o. female who presents with complaints of left hip pain.  Patient reports that she started with the pain about 3 months ago, it initially started with a twist\" a pop which was very painful\" in the left groin area.  She has had multiple episodes of mechanical symptoms involving the left hip joint with intermittent pain.  She does see a chiropractor who does manipulations on her back since she has significant scoliosis but reports\" this is different\" she describes the hip pain as a moderate constant ache with clicking.  Worse with walking, slightly better with rest.    Allergies:   Allergies   Allergen Reactions   • Codeine Dizziness   • Levaquin [Levofloxacin] GI Intolerance   • Qvar [Beclomethasone] GI Intolerance     Tingling on one side of face   • Sulfa Antibiotics Hives   • Erythromycin Diarrhea   • Keflex [Cephalexin] Rash       Medications:   Current Outpatient Prescriptions   Medication Sig Dispense Refill   • ASMANEX 60 METERED DOSES 220 MCG/INH inhaler Inhale 1 puff Daily.     • azelastine (ASTEPRO) 0.15 % solution nasal spray 1 spray into each nostril Daily.     • BIOTIN PO Take  by mouth Daily.     • Ca Carbonate-Mag Hydroxide (ROLAIDS PO) Take  by mouth.     • desloratadine (CLARINEX) 5 MG tablet      • diltiaZEM (CARDIZEM) 30 MG tablet TAKE 1 TABLET TWICE A  tablet 3   • FLUTICASONE PROPIONATE, NASAL, NA 1 spray into each nostril Daily.     • Glucosamine-Chondroit-Vit C-Mn (GLUCOSAMINE CHONDR 1500 COMPLX PO) Take 2 tablets by mouth daily.     • lansoprazole (PREVACID) 30 MG capsule Take 1 capsule by mouth Daily. 90 capsule 3   • montelukast (SINGULAIR) 10 MG tablet Take 10 mg by mouth Every Night.     • Multiple Vitamin (MULTI VITAMIN DAILY PO) " "Take 1 tablet by mouth daily.     • PROAIR  (90 BASE) MCG/ACT inhaler      • Turmeric 500 MG tablet Take 500 mg by mouth Daily.     • Wheat Dextrin (BENEFIBER) powder Take 1 application by mouth Daily.     • XIIDRA 5 % solution Administer 1 drop to both eyes 2 (Two) Times a Day.     • meloxicam (MOBIC) 7.5 MG tablet Take 1 tablet by mouth Daily. With food 30 tablet 1   • Unable to find 1 each 1 (One) Time. IB GUARD     • uribel (URO-MP) 118 MG capsule capsule TK 1 C PO  TID PRN  12     No current facility-administered medications for this visit.          The following portions of the patient's history were reviewed and updated as appropriate: allergies, current medications, past family history, past medical history, past social history, past surgical history and problem list.    Review of Systems:   A 14 point review of systems was performed. All systems negative except pertinent positives/negative listed in HPI above    Physical Exam:   Vitals:    10/30/18 0926   Weight: 73 kg (161 lb)   Height: 165.1 cm (65\")       General: A and O x 3, ASA, NAD    SCLERA:    Normal    DENTITION:   Normal  Skin clear no unusual lesions noted  Left hip patient is nontender palpation she has pain with internal extra rotation with a positive central negative logroll calf is soft and nontender    Radiology:  Xrays 2 views of left hip were ordered and reviewed today secondary to pain and were normal.  Comparative views are unchanged    Assessment/Plan:  Left hip pain following injury with mechanical symptoms    I'm highly suspicious patient could have a labral tear.  We will proceed with an MR arthrogram of the left hip to further evaluate and she'll call couple days after regarding results and treatment options  "

## 2018-11-13 ENCOUNTER — HOSPITAL ENCOUNTER (OUTPATIENT)
Dept: MRI IMAGING | Facility: HOSPITAL | Age: 58
Discharge: HOME | End: 2018-11-13

## 2018-11-13 ENCOUNTER — HOSPITAL ENCOUNTER (OUTPATIENT)
Dept: GENERAL RADIOLOGY | Facility: HOSPITAL | Age: 58
Discharge: HOME | End: 2018-11-13
Admitting: RADIOLOGY

## 2018-11-13 DIAGNOSIS — M25.552 HIP PAIN, LEFT: ICD-10-CM

## 2018-11-13 PROCEDURE — 77002 NEEDLE LOCALIZATION BY XRAY: CPT

## 2018-11-13 PROCEDURE — A9577 INJ MULTIHANCE: HCPCS | Performed by: RADIOLOGY

## 2018-11-13 PROCEDURE — 73722 MRI JOINT OF LWR EXTR W/DYE: CPT

## 2018-11-13 PROCEDURE — 25010000002 IOPAMIDOL 61 % SOLUTION: Performed by: RADIOLOGY

## 2018-11-13 PROCEDURE — 0 GADOBENATE DIMEGLUMINE 529 MG/ML SOLUTION: Performed by: RADIOLOGY

## 2018-11-13 RX ORDER — LIDOCAINE HYDROCHLORIDE 10 MG/ML
10 INJECTION, SOLUTION INFILTRATION; PERINEURAL ONCE
Status: COMPLETED | OUTPATIENT
Start: 2018-11-13 | End: 2018-11-13

## 2018-11-13 RX ADMIN — IOPAMIDOL 1 ML: 612 INJECTION, SOLUTION INTRAVENOUS at 13:42

## 2018-11-13 RX ADMIN — GADOBENATE DIMEGLUMINE 0.05 ML: 529 INJECTION, SOLUTION INTRAVENOUS at 13:42

## 2018-11-13 RX ADMIN — LIDOCAINE HYDROCHLORIDE 3 ML: 10 INJECTION, SOLUTION INFILTRATION; PERINEURAL at 13:42

## 2018-11-16 DIAGNOSIS — M25.552 PAIN OF LEFT HIP JOINT: Primary | ICD-10-CM

## 2018-11-19 ENCOUNTER — TELEPHONE (OUTPATIENT)
Dept: ORTHOPEDIC SURGERY | Facility: CLINIC | Age: 58
End: 2018-11-19

## 2018-11-19 DIAGNOSIS — M25.552 PAIN OF LEFT HIP JOINT: Primary | ICD-10-CM

## 2018-11-20 NOTE — TELEPHONE ENCOUNTER
I just spoke with Mrs. Boone over the phone she does have a labral tear but also has some mild early arthritic change she's currently begins the idea of surgery at this point and we will try a fluoroscopy guided cortisone injection.  I put the order in for a fluoroscopy guided cortisone injection and if she's not better she'll call back and we'll either set up and put with Dr. West or have her return to see me with repeat x-rays and possibly discuss hip replacement although I would favor arthroscopy if she doesn't improve with the injection.

## 2018-12-13 ENCOUNTER — OFFICE VISIT (OUTPATIENT)
Dept: CARDIOLOGY | Facility: CLINIC | Age: 58
End: 2018-12-13

## 2018-12-13 VITALS
SYSTOLIC BLOOD PRESSURE: 110 MMHG | HEART RATE: 80 BPM | BODY MASS INDEX: 27.72 KG/M2 | OXYGEN SATURATION: 95 % | WEIGHT: 166.4 LBS | HEIGHT: 65 IN | DIASTOLIC BLOOD PRESSURE: 74 MMHG

## 2018-12-13 DIAGNOSIS — R00.2 PALPITATIONS: ICD-10-CM

## 2018-12-13 DIAGNOSIS — I49.1 PREMATURE ATRIAL CONTRACTIONS: ICD-10-CM

## 2018-12-13 DIAGNOSIS — I47.1 SVT (SUPRAVENTRICULAR TACHYCARDIA) (HCC): Primary | ICD-10-CM

## 2018-12-13 PROCEDURE — 99213 OFFICE O/P EST LOW 20 MIN: CPT | Performed by: INTERNAL MEDICINE

## 2018-12-13 NOTE — PROGRESS NOTES
"Date of Office Visit: 2018  Encounter Provider: Moo Samuels MD  Place of Service: [facility] CARDIOLOGY  Patient Name: Rosalinda Boone  :1960    Chief complaint: Follow-up for SVT, PAC's, palpitations.    History of Present Illness:    Dear Dr. Sosa:     I again had the pleasure of seeing your patient in cardiology office on 2018.  As you   well know, she is a very pleasant 58 year-old white female with a past medical history   significant for SVT, asthma, GERD, and gastritis who presents for follow-up.  I initially   saw the patient on 2017.  At that point, she had been having more frequent   palpitations which she described as a \"flipping\" sensation which occurred for a few   seconds and then resolved.  She would typically feels as if she needed to take a deep   breath or cough when these occurred.  She had recently started menopause, and had   recently gone off of hormone replacement therapy.  Her symptoms did correlate   somewhat with stopping the hormone therapy.  She had also had occasional chest   tightness and shortness of breath.  Her TSH had been checked in 2017 was  normal at 2.15.  She did wear a Holter monitor on 2017 which showed only rare   PVCs and was essentially normal as well.  She wore a 14 day Zio Patch starting on   2017.  This showed 33 brief runs of SVT, with the longest being 20 beats in   length at 153 bpm.  The fastest of these was 8 beats in length at 200 bpm.  She did   also undergo a stress echocardiogram on 1/10/2018 which showed no evidence of   ischemia, an ejection fraction of 60-65%, normal diastolic function, and no significant   valvular disease.  She was subsequently started on diltiazem at 30 mg twice a day   after the SVT was diagnosed.  She did later develop palpitations again, and wore a   second event recorder on 2018.  This showed occasional PACs, and very brief   runs of SVT, ranging " between 6-8 beats in length.  The palpitations correlated to the   very brief runs of SVT, as well as occasionally to the isolated PACs.     The patient presents today for follow-up.  She has had 3 significant episodes of   palpitations since her last visit.  She describes these as more prolonged, and during   which time she had to cough deeply.  She still has regular palpitations as well,   although these are much less intense.  She has not had any shortness of breath   other than her asthma.  She does feel fatigued.      Past Medical History:   Diagnosis Date   • Asthma    • Gastritis    • GERD (gastroesophageal reflux disease)    • IBS (irritable bowel syndrome)    • Palpitations    • Right sided abdominal pain    • SOB (shortness of breath)    • SVT (supraventricular tachycardia) (CMS/HCC)     14 day Zio Patch starting on 12/22/17: 33 brief runs of SVT (longest 20 beats in length at 153 bpm).   • Tingling        Past Surgical History:   Procedure Laterality Date   • D&C WITH SUCTION     • LASIK         Current Outpatient Medications on File Prior to Visit   Medication Sig Dispense Refill   • ASMANEX 60 METERED DOSES 220 MCG/INH inhaler Inhale 1 puff Daily.     • azelastine (ASTEPRO) 0.15 % solution nasal spray 1 spray into each nostril Daily.     • BIOTIN PO Take  by mouth Daily.     • Ca Carbonate-Mag Hydroxide (ROLAIDS PO) Take  by mouth.     • desloratadine (CLARINEX) 5 MG tablet      • diltiaZEM (CARDIZEM) 30 MG tablet TAKE 1 TABLET TWICE A  tablet 3   • FLUTICASONE PROPIONATE, NASAL, NA 1 spray into each nostril Daily.     • Glucosamine-Chondroit-Vit C-Mn (GLUCOSAMINE CHONDR 1500 COMPLX PO) Take 2 tablets by mouth daily.     • lansoprazole (PREVACID) 30 MG capsule Take 1 capsule by mouth Daily. 90 capsule 3   • meloxicam (MOBIC) 7.5 MG tablet Take 1 tablet by mouth Daily. With food 30 tablet 1   • montelukast (SINGULAIR) 10 MG tablet Take 10 mg by mouth Every Night.     • Multiple Vitamin (MULTI  VITAMIN DAILY PO) Take 1 tablet by mouth daily.     • PROAIR  (90 BASE) MCG/ACT inhaler      • Turmeric 500 MG tablet Take 500 mg by mouth Daily.     • Unable to find 1 each 1 (One) Time. IB GUARD     • uribel (URO-MP) 118 MG capsule capsule TK 1 C PO  TID PRN  12   • Wheat Dextrin (BENEFIBER) powder Take 1 application by mouth Daily.     • XIIDRA 5 % solution Administer 1 drop to both eyes 2 (Two) Times a Day.       No current facility-administered medications on file prior to visit.      Allergies as of 12/13/2018 - Reviewed 12/13/2018   Allergen Reaction Noted   • Codeine Dizziness 06/30/2016   • Levaquin [levofloxacin] GI Intolerance 06/30/2016   • Qvar [beclomethasone] GI Intolerance 07/12/2017   • Sulfa antibiotics Hives 06/30/2016   • Erythromycin Diarrhea 08/09/2016   • Keflex [cephalexin] Rash 10/30/2017     Social History     Socioeconomic History   • Marital status:      Spouse name: MATTIE ALMARAZ   • Number of children: 2   • Years of education: Not on file   • Highest education level: Not on file   Social Needs   • Financial resource strain: Not on file   • Food insecurity - worry: Not on file   • Food insecurity - inability: Not on file   • Transportation needs - medical: Not on file   • Transportation needs - non-medical: Not on file   Occupational History   • Occupation: , [employer]     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   Tobacco Use   • Smoking status: Never Smoker   • Smokeless tobacco: Never Used   Substance and Sexual Activity   • Alcohol use: Yes     Alcohol/week: 0.6 - 1.2 oz     Types: 1 - 2 Glasses of wine per week     Comment: daily   • Drug use: No   • Sexual activity: Yes     Partners: Male   Other Topics Concern   • Not on file   Social History Narrative    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   Cardiovascular: Positive for palpitations.   Psychiatric/Behavioral: The patient is nervous/anxious.    All other systems reviewed and are negative.     Objective:     Vitals:    12/13/18 1443   BP: 110/74   Pulse: 80   SpO2: 95%   Weight: 75.5 kg (166 lb 6.4 oz)   Height: 165.1 cm (65\")     Body mass index is 27.69 kg/m².    Physical Exam   Constitutional: She is oriented to person, place, and time. She appears well-developed and well-nourished.   HENT:   Head: Normocephalic and atraumatic.   Eyes: Conjunctivae are normal.   Neck: Neck supple.   Cardiovascular: Normal rate and regular rhythm. Exam reveals no gallop and no friction rub.   No murmur heard.  Pulmonary/Chest: Effort normal and breath sounds normal.   Abdominal: Soft. There is no tenderness.   Musculoskeletal: She exhibits no edema.   Neurological: She is alert and oriented to person, place, and time.   Skin: Skin is warm.   Psychiatric: She has a normal mood and affect. Her behavior is normal.     Lab Review:   Procedures    Cardiac Procedures:  1. 14 day Zio Patch starting on 12/22/17: 33 brief runs of SVT, with the longest being   20 beats in length at 153 bpm.  The fastest was 8 beats at 200 bpm.    2. Stress echocardiogram on 1/10/18: There was no evidence of ischemia.  The   ejection fraction was 60-65%.  Diastolic function was normal.  The right ventricle was   normal.  There was no significant valvular disease.  3.  Event recorder on 6/20/2018: There were several very brief runs of paroxysmal SVT,   ranging from 6-8 beats in length.  PACs occurred occasionally.  Most of the episodes   of skipped beats correlated very brief runs of paroxysmal SVT.  Occasional palpitations   correlated to isolated PACs.    Assessment:       Diagnosis Plan   1. SVT (supraventricular tachycardia) (CMS/HCC)     2. " Premature atrial contractions     3. Palpitations       Plan:       I feel that the main source of her significant palpitations is likely SVT.  When she coughs   typically this goes away.  Most of the runs on her event recorder have been very brief in   nature.  She also likely feels palpitations from the short runs and the PACs.  For now, I   am going to keep her on the diltiazem at 30 mg twice a day.  Her blood pressure normally   runs low, and I do not want to increase the diltiazem for this reason.  I did not use beta   blockers as she has baseline asthma.  I did advise her to let me know if her symptoms   change or if the episodes become more prolonged.  I also asked her to let me know if   she feels like the palpitations become irregular (such as in atrial fibrillation).  For now, I   will plan on seeing her back in 6 months.

## 2018-12-18 ENCOUNTER — HOSPITAL ENCOUNTER (OUTPATIENT)
Dept: GENERAL RADIOLOGY | Facility: HOSPITAL | Age: 58
End: 2018-12-18
Attending: ORTHOPAEDIC SURGERY

## 2019-01-28 ENCOUNTER — APPOINTMENT (OUTPATIENT)
Dept: WOMENS IMAGING | Facility: HOSPITAL | Age: 59
End: 2019-01-28

## 2019-01-28 PROCEDURE — 77067 SCR MAMMO BI INCL CAD: CPT | Performed by: RADIOLOGY

## 2019-01-28 PROCEDURE — 77063 BREAST TOMOSYNTHESIS BI: CPT | Performed by: RADIOLOGY

## 2019-03-20 ENCOUNTER — OFFICE VISIT (OUTPATIENT)
Dept: OBSTETRICS AND GYNECOLOGY | Facility: CLINIC | Age: 59
End: 2019-03-20

## 2019-03-20 VITALS
HEIGHT: 65 IN | BODY MASS INDEX: 28.32 KG/M2 | WEIGHT: 170 LBS | DIASTOLIC BLOOD PRESSURE: 72 MMHG | SYSTOLIC BLOOD PRESSURE: 118 MMHG

## 2019-03-20 DIAGNOSIS — Z13.9 SCREENING FOR CONDITION: ICD-10-CM

## 2019-03-20 DIAGNOSIS — N95.2 VAGINAL ATROPHY: ICD-10-CM

## 2019-03-20 DIAGNOSIS — Z01.419 WELL WOMAN EXAM WITH ROUTINE GYNECOLOGICAL EXAM: Primary | ICD-10-CM

## 2019-03-20 DIAGNOSIS — N95.1 HOT FLASHES DUE TO MENOPAUSE: ICD-10-CM

## 2019-03-20 DIAGNOSIS — F41.9 ANXIETY: ICD-10-CM

## 2019-03-20 PROBLEM — Z12.39 BREAST CANCER SCREENING: Status: RESOLVED | Noted: 2017-09-19 | Resolved: 2019-03-20

## 2019-03-20 PROBLEM — Z12.4 CERVICAL CANCER SCREENING: Status: RESOLVED | Noted: 2017-09-19 | Resolved: 2019-03-20

## 2019-03-20 LAB
BILIRUB BLD-MCNC: NEGATIVE MG/DL
CLARITY, POC: CLEAR
COLOR UR: ABNORMAL
GLUCOSE UR STRIP-MCNC: NEGATIVE MG/DL
KETONES UR QL: NEGATIVE
LEUKOCYTE EST, POC: NEGATIVE
NITRITE UR-MCNC: NEGATIVE MG/ML
PH UR: 5 [PH] (ref 5–8)
PROT UR STRIP-MCNC: NEGATIVE MG/DL
RBC # UR STRIP: NEGATIVE /UL
SP GR UR: 1 (ref 1–1.03)
UROBILINOGEN UR QL: NORMAL

## 2019-03-20 PROCEDURE — 81002 URINALYSIS NONAUTO W/O SCOPE: CPT | Performed by: OBSTETRICS & GYNECOLOGY

## 2019-03-20 PROCEDURE — 99396 PREV VISIT EST AGE 40-64: CPT | Performed by: OBSTETRICS & GYNECOLOGY

## 2019-03-20 PROCEDURE — 99213 OFFICE O/P EST LOW 20 MIN: CPT | Performed by: OBSTETRICS & GYNECOLOGY

## 2019-03-20 RX ORDER — PAROXETINE 10 MG/1
10 TABLET, FILM COATED ORAL NIGHTLY
Qty: 30 TABLET | Refills: 3 | Status: SHIPPED | OUTPATIENT
Start: 2019-03-20 | End: 2019-06-19

## 2019-03-20 RX ORDER — ESTRADIOL 0.1 MG/G
1 CREAM VAGINAL 2 TIMES WEEKLY
Qty: 45 G | Refills: 6 | Status: SHIPPED | OUTPATIENT
Start: 2019-03-21 | End: 2019-05-08 | Stop reason: SDUPTHER

## 2019-03-20 NOTE — PROGRESS NOTES
GYN Annual Exam     CC- Here for annual exam.     Rosalinda Boone is a 58 y.o. female new patient who presents for annual well woman exam. She underwent at 52 and was on HRT for 1-2 years. She stopped in 2018 and while she did not feel great on HRT, she feels even worse off of it. She is having hot flashes and sleeps only 90 minutes a time. She also has palpitations and panic attacks as she is having a hot flash. She has seen a cardiologist and her cardiac workup was normal and she is on a low dose  Cardizam. No  VB. She has frequent UTIs and has seen urology and is on Uribel. She also has pain with sex and dryness.      OB History      Para Term  AB Living    2 2 2     2    SAB TAB Ectopic Molar Multiple Live Births                       Obstetric Comments    2           Menarche:13  Menopause:52  HRT:yes x 2 years, none now  Current contraception: post menopausal status  History of abnormal Pap smear: no  History of abnormal mammogram: no  Family history of uterine, colon or ovarian cancer: no  Family history of breast cancer: no  STD's: non  Last pap smear:2017      Health Maintenance   Topic Date Due   • TDAP/TD VACCINES (1 - Tdap) 1979   • ZOSTER VACCINE (1 of 2) 2010   • ANNUAL PHYSICAL  2018   • PAP SMEAR  2020   • MAMMOGRAM  2021   • COLONOSCOPY  2026   • HEPATITIS C SCREENING  Completed   • INFLUENZA VACCINE  Completed       Past Medical History:   Diagnosis Date   • Asthma    • Gastritis    • GERD (gastroesophageal reflux disease)    • IBS (irritable bowel syndrome)    • IBS (irritable bowel syndrome)    • Palpitations    • Panic attacks    • Premature atrial contractions    • Right sided abdominal pain    • SOB (shortness of breath)    • SVT (supraventricular tachycardia) (CMS/HCC)     14 day Zio Patch starting on 17: 33 brief runs of SVT (longest 20 beats in length at 153 bpm).    • Tingling    • Urinary tract infection        Past Surgical  History:   Procedure Laterality Date   • COLONOSCOPY N/A 12/5/2016    [facility],    • D&C WITH SUCTION      for DUB   • ENDOSCOPY N/A 12/5/2016    z line regular, 40 cm from incisors,  granular gastric mucosa, chronic gastritis   • LASIK           Current Outpatient Medications:   •  ASMANEX 60 METERED DOSES 220 MCG/INH inhaler, Inhale 1 puff Daily., Disp: , Rfl:   •  azelastine (ASTEPRO) 0.15 % solution nasal spray, 1 spray into each nostril Daily., Disp: , Rfl:   •  BIOTIN PO, Take  by mouth Daily., Disp: , Rfl:   •  Ca Carbonate-Mag Hydroxide (ROLAIDS PO), Take  by mouth., Disp: , Rfl:   •  desloratadine (CLARINEX) 5 MG tablet, , Disp: , Rfl:   •  diltiaZEM (CARDIZEM) 30 MG tablet, TAKE 1 TABLET TWICE A DAY, Disp: 180 tablet, Rfl: 3  •  [START ON 3/21/2019] estradiol (ESTRACE) 0.1 MG/GM vaginal cream, Insert 1 g into the vagina 2 (Two) Times a Week., Disp: 45 g, Rfl: 6  •  FLUTICASONE PROPIONATE, NASAL, NA, 1 spray into each nostril Daily., Disp: , Rfl:   •  Glucosamine-Chondroit-Vit C-Mn (GLUCOSAMINE CHONDR 1500 COMPLX PO), Take 2 tablets by mouth daily., Disp: , Rfl:   •  lansoprazole (PREVACID) 30 MG capsule, Take 1 capsule by mouth Daily., Disp: 90 capsule, Rfl: 3  •  meloxicam (MOBIC) 7.5 MG tablet, Take 1 tablet by mouth Daily. With food, Disp: 30 tablet, Rfl: 1  •  montelukast (SINGULAIR) 10 MG tablet, Take 10 mg by mouth Every Night., Disp: , Rfl:   •  Multiple Vitamin (MULTI VITAMIN DAILY PO), Take 1 tablet by mouth daily., Disp: , Rfl:   •  PARoxetine (PAXIL) 10 MG tablet, Take 1 tablet by mouth Every Night., Disp: 30 tablet, Rfl: 3  •  PROAIR  (90 BASE) MCG/ACT inhaler, , Disp: , Rfl:   •  Turmeric 500 MG tablet, Take 500 mg by mouth Daily., Disp: , Rfl:   •  Unable to find, 1 each 1 (One) Time. IB GUARD, Disp: , Rfl:   •  uribel (URO-MP) 118 MG capsule capsule, TK 1 C PO  TID PRN, Disp: , Rfl: 12  •  Wheat Dextrin (BENEFIBER) powder, Take 1 application by mouth Daily., Disp: , Rfl:   •  XIIDRA 5 %  "solution, Administer 1 drop to both eyes 2 (Two) Times a Day., Disp: , Rfl:     Allergies   Allergen Reactions   • Codeine Dizziness   • Levaquin [Levofloxacin] GI Intolerance   • Qvar [Beclomethasone] GI Intolerance     Tingling on one side of face   • Sulfa Antibiotics Hives   • Erythromycin Diarrhea   • Keflex [Cephalexin] Rash       Social History     Tobacco Use   • Smoking status: Never Smoker   • Smokeless tobacco: Never Used   Substance Use Topics   • Alcohol use: Yes     Alcohol/week: 0.6 - 1.2 oz     Types: 1 - 2 Glasses of wine per week     Comment: daily   • Drug use: No       Family History   Problem Relation Age of Onset   • Cancer Father 63        lung-smoker   • Aneurysm Mother 73        AAA   • Scleroderma Maternal Aunt    • Leukemia Maternal Grandmother    • Aneurysm Maternal Grandfather         Cerebral   • Breast cancer Neg Hx    • Ovarian cancer Neg Hx    • Colon cancer Neg Hx    • Deep vein thrombosis Neg Hx    • Pulmonary embolism Neg Hx        Review of Systems   Constitutional: Positive for unexpected weight change. Negative for appetite change, fatigue and fever.   Respiratory: Negative for cough and shortness of breath.    Cardiovascular: Negative for chest pain and palpitations.   Gastrointestinal: Negative for abdominal distention, abdominal pain, constipation, diarrhea and nausea.   Endocrine: Positive for heat intolerance.   Genitourinary: Positive for dyspareunia and dysuria. Negative for menstrual problem, pelvic pain and vaginal discharge.   Skin: Negative for color change and rash.   Allergic/Immunologic: Negative for environmental allergies and food allergies.   Neurological: Negative for headaches.   Psychiatric/Behavioral: Positive for agitation, behavioral problems, dysphoric mood and sleep disturbance. The patient is nervous/anxious.        /72   Ht 165.1 cm (65\")   Wt 77.1 kg (170 lb)   BMI 28.29 kg/m²     Physical Exam   Constitutional: She is oriented to person, " place, and time. She appears well-developed and well-nourished.   HENT:   Head: Normocephalic and atraumatic.   Eyes: Conjunctivae are normal. No scleral icterus.   Neck: Neck supple. No thyromegaly present.   Cardiovascular: Normal rate and regular rhythm.   Pulmonary/Chest: Effort normal and breath sounds normal. Right breast exhibits no inverted nipple, no mass, no nipple discharge, no skin change and no tenderness. Left breast exhibits no inverted nipple, no mass, no nipple discharge, no skin change and no tenderness.   Abdominal: Soft. Bowel sounds are normal. She exhibits no distension and no mass. There is no tenderness. There is no rebound and no guarding. No hernia.   Genitourinary: Uterus normal. Pelvic exam was performed with patient supine. There is no rash, tenderness or lesion on the right labia. There is no rash, tenderness or lesion on the left labia. Cervix exhibits no motion tenderness, no discharge and no friability. Right adnexum displays no mass, no tenderness and no fullness. Left adnexum displays no mass, no tenderness and no fullness. No erythema, tenderness or bleeding in the vagina. No foreign body in the vagina. No signs of injury around the vagina. No vaginal discharge found.   Genitourinary Comments: Moderate atrophy  Cystocele noted   Neurological: She is alert and oriented to person, place, and time.   Skin: Skin is warm and dry.   Psychiatric: She has a normal mood and affect. Her behavior is normal. Judgment and thought content normal.   Nursing note and vitals reviewed.         Assessment/Plan    1) GYN HM: check pap/HPV   SBE demonstrated and encouraged.  2) STD screening: declines Condoms encouraged.  3) Bone health - Weight bearing exercise, dietary calcium recommendations and vitamin D reviewed.   4) Diet and Exercise discussed  5) Smoking Status: No   6) Hot flashes and panic attacks- d/w pt treatment options including SSRI and/or HRT. She is concerned about risk of cancer with  HRT and wants to try SSRI first. Rx Paxil 10 mg QHS. RTO 4-6 weeks f/u meds  7)MMG: UTD 1/2019- Birads 2  8) DEXA- schedule   9)C scope- UTD per pt.   10) Vaginal atrophy and frequent UTI- Rx Estrace x 2/week. Patient counseled that vaginal estrogen rings, creams and tablets are available and highly effective at treating local vaginal symptoms such as atrophy and vaginal dryness.  Vaginal estrogen does not cause uterine overgrowth and does NOT require a progestogen to protect the uterus.  Very small amounts of estrogen are absorbed systemically.  For patients with a history of an estrogen dependent cancer such as breast cancer, the decision to use local estrogen for local vaginal symptoms should be made after consultation with her oncologist.  Possible side effects include local irritation or burning and/or vaginal bleeding and should always be reported.     Follow up prn and 1 year       Rosalinda was seen today for gynecologic exam.    Diagnoses and all orders for this visit:    Well woman exam with routine gynecological exam  -     POC Urinalysis Dipstick  -     Pap IG, HPV-hr    Screening for condition  -     DEXA Bone Density Axial; Future    Hot flashes due to menopause    Vaginal atrophy    Anxiety    Other orders  -     estradiol (ESTRACE) 0.1 MG/GM vaginal cream; Insert 1 g into the vagina 2 (Two) Times a Week.  -     PARoxetine (PAXIL) 10 MG tablet; Take 1 tablet by mouth Every Night.        Giana Paul MD  3/20/2019  7:48 PM

## 2019-03-23 LAB
CYTOLOGIST CVX/VAG CYTO: NORMAL
CYTOLOGY CVX/VAG DOC THIN PREP: NORMAL
DX ICD CODE: NORMAL
HIV 1 & 2 AB SER-IMP: NORMAL
HPV I/H RISK 1 DNA CVX QL PROBE+SIG AMP: NORMAL
HPV I/H RISK 4 DNA CVX QL PROBE+SIG AMP: NEGATIVE
OTHER STN SPEC: NORMAL
PATH REPORT.FINAL DX SPEC: NORMAL
STAT OF ADQ CVX/VAG CYTO-IMP: NORMAL

## 2019-03-27 ENCOUNTER — TELEPHONE (OUTPATIENT)
Dept: OBSTETRICS AND GYNECOLOGY | Facility: CLINIC | Age: 59
End: 2019-03-27

## 2019-03-27 NOTE — TELEPHONE ENCOUNTER
Insurance will not pay for Climarapro, patient must use Combipatch or Estradiol patch. Can you send one of these over as an alternative to [facility]

## 2019-04-01 DIAGNOSIS — K21.9 GASTROESOPHAGEAL REFLUX DISEASE WITHOUT ESOPHAGITIS: ICD-10-CM

## 2019-04-03 RX ORDER — LANSOPRAZOLE 30 MG/1
CAPSULE, DELAYED RELEASE ORAL
Qty: 90 CAPSULE | Refills: 3 | OUTPATIENT
Start: 2019-04-03

## 2019-04-09 ENCOUNTER — HOSPITAL ENCOUNTER (OUTPATIENT)
Dept: BONE DENSITY | Facility: HOSPITAL | Age: 59
Discharge: HOME | End: 2019-04-09
Admitting: OBSTETRICS & GYNECOLOGY

## 2019-04-09 DIAGNOSIS — Z13.9 SCREENING FOR CONDITION: ICD-10-CM

## 2019-04-09 PROCEDURE — 77080 DXA BONE DENSITY AXIAL: CPT

## 2019-04-17 ENCOUNTER — TELEPHONE (OUTPATIENT)
Dept: GASTROENTEROLOGY | Facility: CLINIC | Age: 59
End: 2019-04-17

## 2019-04-17 ENCOUNTER — OFFICE VISIT (OUTPATIENT)
Dept: GASTROENTEROLOGY | Facility: CLINIC | Age: 59
End: 2019-04-17

## 2019-04-17 VITALS
SYSTOLIC BLOOD PRESSURE: 118 MMHG | DIASTOLIC BLOOD PRESSURE: 70 MMHG | BODY MASS INDEX: 28.39 KG/M2 | WEIGHT: 170.4 LBS | HEIGHT: 65 IN | TEMPERATURE: 98.2 F

## 2019-04-17 DIAGNOSIS — R14.0 BLOATING: Primary | ICD-10-CM

## 2019-04-17 DIAGNOSIS — K21.9 GASTROESOPHAGEAL REFLUX DISEASE WITHOUT ESOPHAGITIS: ICD-10-CM

## 2019-04-17 DIAGNOSIS — K58.0 IRRITABLE BOWEL SYNDROME WITH DIARRHEA: ICD-10-CM

## 2019-04-17 PROCEDURE — 99214 OFFICE O/P EST MOD 30 MIN: CPT | Performed by: NURSE PRACTITIONER

## 2019-04-17 RX ORDER — LANSOPRAZOLE 30 MG/1
30 CAPSULE, DELAYED RELEASE ORAL DAILY
Qty: 90 CAPSULE | Refills: 3 | Status: SHIPPED | OUTPATIENT
Start: 2019-04-17 | End: 2019-10-29 | Stop reason: SDUPTHER

## 2019-04-17 NOTE — TELEPHONE ENCOUNTER
Sibo testing ordered.  Order form and copy of insurance cards faxed to [facility] at [phone] and fax confirmation received.  Kit and instructions given to pt and pt verb understanding

## 2019-04-17 NOTE — PROGRESS NOTES
Chief Complaint   Patient presents with   • Heartburn     HPI    Rosalinda Boone is a  59 y.o. female here for a follow up visit for GERD and irritable bowel syndrome diarrhea predominant.  This is a patient of Dr. Campas, new to me.  Last seen in March 2018.  GERD previously well managed on Prevacid 30 mg once daily.  Last EGD was 2016 which showed chronic gastritis with negative pathology.    On visit today the patient continues on Prevacid 30 mg once daily.  She will have breakthrough symptoms 2 times a week with certain dietary triggers for which she takes Rolaids.  She denies nausea, vomiting, or dysphagia.  Appetite is good.  Weight is stable. She avoids NSAIDs.    Her bowels are moving 1-2 times daily with formed stools, denies rectal bleeding.  She is on a daily fiber supplement which controls her irritable bowel syndrome for the most part.  She does report excessive bloating with no identifiable trigger.  She tried lactose-free diet with no change in bloating.     Her last colonoscopy was 2016 with nonbleeding internal hemorrhoids otherwise normal.  Past Medical History:   Diagnosis Date   • Asthma    • Gastritis    • GERD (gastroesophageal reflux disease)    • IBS (irritable bowel syndrome)    • IBS (irritable bowel syndrome)    • Palpitations    • Panic attacks    • Premature atrial contractions    • Right sided abdominal pain    • SOB (shortness of breath)    • SVT (supraventricular tachycardia) (CMS/HCC)     14 day Zio Patch starting on 12/22/17: 33 brief runs of SVT (longest 20 beats in length at 153 bpm).    • Tingling    • Urinary tract infection        Past Surgical History:   Procedure Laterality Date   • COLONOSCOPY N/A 12/5/2016    NBIH,    • D&C WITH SUCTION      for DUB   • ENDOSCOPY N/A 12/5/2016    z line regular, 40 cm from incisors,  granular gastric mucosa, chronic gastritis   • LASIK         Scheduled Meds:  Outpatient Encounter Medications as of 4/17/2019   Medication Sig Dispense Refill    • ASMANEX 60 METERED DOSES 220 MCG/INH inhaler Inhale 1 puff Daily.     • azelastine (ASTEPRO) 0.15 % solution nasal spray 1 spray into each nostril Daily.     • BIOTIN PO Take  by mouth Daily.     • Ca Carbonate-Mag Hydroxide (ROLAIDS PO) Take  by mouth.     • desloratadine (CLARINEX) 5 MG tablet      • diltiaZEM (CARDIZEM) 30 MG tablet TAKE 1 TABLET TWICE A  tablet 3   • estradiol (ESTRACE) 0.1 MG/GM vaginal cream Insert 1 g into the vagina 2 (Two) Times a Week. 45 g 6   • estradiol-norethindrone (COMBIPATCH) 0.05-0.14 MG/DAY patch Place 1 patch on the skin as directed by provider 2 (Two) Times a Week. 8 each 1   • FLUTICASONE PROPIONATE, NASAL, NA 1 spray into each nostril Daily.     • Glucosamine-Chondroit-Vit C-Mn (GLUCOSAMINE CHONDR 1500 COMPLX PO) Take 2 tablets by mouth daily.     • lansoprazole (PREVACID) 30 MG capsule Take 1 capsule by mouth Daily. 90 capsule 3   • meloxicam (MOBIC) 7.5 MG tablet Take 1 tablet by mouth Daily. With food 30 tablet 1   • montelukast (SINGULAIR) 10 MG tablet Take 10 mg by mouth Every Night.     • Multiple Vitamin (MULTI VITAMIN DAILY PO) Take 1 tablet by mouth daily.     • PARoxetine (PAXIL) 10 MG tablet Take 1 tablet by mouth Every Night. 30 tablet 3   • PROAIR  (90 BASE) MCG/ACT inhaler      • Turmeric 500 MG tablet Take 500 mg by mouth Daily.     • Unable to find 1 each 1 (One) Time. IB GUARD     • uribel (URO-MP) 118 MG capsule capsule TK 1 C PO  TID PRN  12   • Wheat Dextrin (BENEFIBER) powder Take 1 application by mouth Daily.     • XIIDRA 5 % solution Administer 1 drop to both eyes 2 (Two) Times a Day.     • [DISCONTINUED] lansoprazole (PREVACID) 30 MG capsule Take 1 capsule by mouth Daily. 90 capsule 3     No facility-administered encounter medications on file as of 4/17/2019.        Continuous Infusions:  No current facility-administered medications for this visit.     PRN Meds:.    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   Tobacco Use   • Smoking status: Never Smoker   • Smokeless tobacco: Never Used   Substance and Sexual Activity   • Alcohol use: Yes     Alcohol/week: 0.6 - 1.2 oz     Types: 1 - 2 Glasses of wine per week     Comment: daily   • Drug use: No   • Sexual activity: Yes     Partners: Male     Birth control/protection: Post-menopausal   Social History Narrative    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   HENT: Negative for trouble swallowing.    Respiratory: Negative for apnea, cough, choking, chest tightness, shortness of breath and wheezing.    Cardiovascular: Negative for chest pain, palpitations and leg swelling.   Gastrointestinal: Negative for abdominal distention, abdominal pain, anal bleeding, blood in stool, constipation, diarrhea, nausea, rectal pain and vomiting.        + bloating        Vitals:    04/17/19 1522   BP: 118/70   Temp: 98.2 °F (36.8 °C)       Physical Exam   Constitutional: She is oriented to  person, place, and time. She appears well-developed and well-nourished.   Eyes: Pupils are equal, round, and reactive to light.   Cardiovascular: Normal rate, regular rhythm and normal heart sounds.   Pulmonary/Chest: Effort normal and breath sounds normal. No respiratory distress. She has no wheezes.   Abdominal: Soft. Bowel sounds are normal. She exhibits no distension and no mass. There is no tenderness. There is no guarding. No hernia.   Musculoskeletal: Normal range of motion.   Neurological: She is alert and oriented to person, place, and time.   Skin: Skin is warm and dry. Capillary refill takes less than 2 seconds.   Psychiatric: She has a normal mood and affect. Her behavior is normal.       No images are attached to the encounter.    Rosalinda was seen today for heartburn.    Diagnoses and all orders for this visit:    Bloating    Gastroesophageal reflux disease without esophagitis  -     lansoprazole (PREVACID) 30 MG capsule; Take 1 capsule by mouth Daily.    Irritable bowel syndrome with diarrhea    Assessment    #1 bloating, likely dietary related.  To be thorough we will have her complete breath test to rule out SIBO.   #2 GERD, seems well controlled on lansoprazole however does report breakthrough symptoms several times a week with certain dietary triggers.  3 irritable bowel syndrome with diarrhea, doing well on fiber supplement.    Plan    SIBO breath test  Continue current PPI.  GERD handout, diet and lifestyle modifications.  Trial of FD Deon for breakthrough symptoms, samples provided.  Continue fiber supplement.  RTC 3 months

## 2019-04-23 ENCOUNTER — TELEPHONE (OUTPATIENT)
Dept: OBSTETRICS AND GYNECOLOGY | Facility: CLINIC | Age: 59
End: 2019-04-23

## 2019-04-23 NOTE — TELEPHONE ENCOUNTER
Patient has osteopenia with a low risk of fracture.  Recommend daily calcium and vitamin D supplementation.  We will repeat her bone density in 2 to 3 years.    Giana Paul MD

## 2019-05-08 ENCOUNTER — OFFICE VISIT (OUTPATIENT)
Dept: OBSTETRICS AND GYNECOLOGY | Facility: CLINIC | Age: 59
End: 2019-05-08

## 2019-05-08 VITALS
WEIGHT: 171.8 LBS | BODY MASS INDEX: 28.62 KG/M2 | DIASTOLIC BLOOD PRESSURE: 80 MMHG | SYSTOLIC BLOOD PRESSURE: 122 MMHG | HEIGHT: 65 IN

## 2019-05-08 DIAGNOSIS — N95.1 HOT FLASHES DUE TO MENOPAUSE: ICD-10-CM

## 2019-05-08 DIAGNOSIS — Z79.890 HORMONE REPLACEMENT THERAPY (HRT): ICD-10-CM

## 2019-05-08 DIAGNOSIS — F41.9 ANXIETY: Primary | ICD-10-CM

## 2019-05-08 PROBLEM — Z01.419 WELL WOMAN EXAM WITH ROUTINE GYNECOLOGICAL EXAM: Status: RESOLVED | Noted: 2017-09-19 | Resolved: 2019-05-08

## 2019-05-08 PROCEDURE — 99213 OFFICE O/P EST LOW 20 MIN: CPT | Performed by: OBSTETRICS & GYNECOLOGY

## 2019-05-08 RX ORDER — ESTRADIOL 0.1 MG/G
1 CREAM VAGINAL 2 TIMES WEEKLY
Qty: 45 G | Refills: 6 | Status: SHIPPED | OUTPATIENT
Start: 2019-05-09 | End: 2019-07-16 | Stop reason: SDUPTHER

## 2019-05-08 NOTE — PROGRESS NOTES
"      Rosalinda Boone is a 59 y.o. patient who presents for follow up of   Chief Complaint   Patient presents with   • Follow-up     feeling much better      60 yo est pt here for f/u meds. She did not end up taking Paxil but called in for HRT instead. She was started on Combipatch twice a week and vaginal estrogen cream ( Estrace). She feels \"so,so much better\". She says she has not had any UTIs and has not needed Uribel since starting HRT. She is having 1 to 2 night sweats per night instead of 1-2 each hour. She is still having panic attacks but they are decreased in intensity and frequency. She has not had any  VB. She has noticed no changes in her cardiac rhythm.         The following portions of the patient's history were reviewed and updated as appropriate: allergies, current medications and problem list.    Review of Systems   Constitutional: Negative for activity change, appetite change, fatigue and unexpected weight change.   Eyes: Negative for photophobia and visual disturbance.   Cardiovascular: Negative for palpitations.   Gastrointestinal: Negative for abdominal pain.   Endocrine: Positive for heat intolerance (improving).   Genitourinary: Negative for vaginal bleeding.   Neurological: Negative for syncope, weakness and light-headedness.   Psychiatric/Behavioral: Negative for dysphoric mood. The patient is nervous/anxious.    All other systems reviewed and are negative.      /80   Ht 165.1 cm (65\")   Wt 77.9 kg (171 lb 12.8 oz)   BMI 28.59 kg/m²     Physical Exam   Constitutional: She is oriented to person, place, and time. She appears well-developed and well-nourished.   HENT:   Head: Normocephalic and atraumatic.   Eyes: Conjunctivae are normal. No scleral icterus.   Abdominal: Soft. Bowel sounds are normal. She exhibits no distension and no mass. There is no tenderness. There is no rebound and no guarding. No hernia.   Neurological: She is alert and oriented to person, place, and time. "   Skin: Skin is warm and dry.   Psychiatric: She has a normal mood and affect. Her behavior is normal. Judgment and thought content normal.   Nursing note and vitals reviewed.    A/P:  1. HRT- pt is doing well on Combipatch. Few side effects. No  VB. Will continue.   2. Atrophy- improving on Estrace.   3. RHM- RTO 3/2020 annual or prn.       Assessment/Plan   Rosalinda was seen today for follow-up.    Diagnoses and all orders for this visit:    Anxiety    Hot flashes due to menopause    Hormone replacement therapy (HRT)    Other orders  -     estradiol (ESTRACE) 0.1 MG/GM vaginal cream; Insert 1 g into the vagina 2 (Two) Times a Week.  -     estradiol-norethindrone (COMBIPATCH) 0.05-0.14 MG/DAY patch; Place 1 patch on the skin as directed by provider 2 (Two) Times a Week.                   No Follow-up on file.      Giana Paul MD    5/8/2019  9:38 PM

## 2019-05-21 ENCOUNTER — TELEPHONE (OUTPATIENT)
Dept: GASTROENTEROLOGY | Facility: CLINIC | Age: 59
End: 2019-05-21

## 2019-05-21 NOTE — TELEPHONE ENCOUNTER
----- Message from Blair Amador sent at 5/21/2019  9:34 AM EDT -----  Regarding: breathe test   Contact: [phone]  Calling for report

## 2019-05-22 NOTE — TELEPHONE ENCOUNTER
Please notify patient her breath test shows evidence of small intestinal bacterial overgrowth.  Lets have her take a course of Xifaxan.  I will send this to her pharmacy.  Have her contact us if the medicine is too expensive.

## 2019-05-22 NOTE — TELEPHONE ENCOUNTER
Call to pt.  Advise per SASHA Self that breath test shows evidence of SIBO.  Take a care of xifaxan - sent to pharmacy.  Contact us if med too expensive.    Advise that may require PA.  Verb understanding.

## 2019-06-13 DIAGNOSIS — Z00.00 HEALTH CARE MAINTENANCE: Primary | ICD-10-CM

## 2019-06-14 LAB
ALBUMIN SERPL-MCNC: 4.3 G/DL (ref 3.5–5.2)
ALBUMIN/GLOB SERPL: 1.7 G/DL
ALP SERPL-CCNC: 64 U/L (ref 39–117)
ALT SERPL-CCNC: 16 U/L (ref 1–33)
AST SERPL-CCNC: 17 U/L (ref 1–32)
BILIRUB SERPL-MCNC: 0.5 MG/DL (ref 0.2–1.2)
BUN SERPL-MCNC: 13 MG/DL (ref 6–20)
BUN/CREAT SERPL: 19.1 (ref 7–25)
CALCIUM SERPL-MCNC: 9.2 MG/DL (ref 8.6–10.5)
CHLORIDE SERPL-SCNC: 103 MMOL/L (ref 98–107)
CHOLEST SERPL-MCNC: 230 MG/DL (ref 0–200)
CO2 SERPL-SCNC: 26.4 MMOL/L (ref 22–29)
CREAT SERPL-MCNC: 0.68 MG/DL (ref 0.57–1)
GLOBULIN SER CALC-MCNC: 2.5 GM/DL
GLUCOSE SERPL-MCNC: 83 MG/DL (ref 65–99)
HDLC SERPL-MCNC: 68 MG/DL (ref 40–60)
LDLC SERPL CALC-MCNC: 125 MG/DL (ref 0–100)
LDLC/HDLC SERPL: 1.84 {RATIO}
POTASSIUM SERPL-SCNC: 4.6 MMOL/L (ref 3.5–5.2)
PROT SERPL-MCNC: 6.8 G/DL (ref 6–8.5)
SODIUM SERPL-SCNC: 141 MMOL/L (ref 136–145)
TRIGL SERPL-MCNC: 185 MG/DL (ref 0–150)
TSH SERPL DL<=0.005 MIU/L-ACNC: 1.73 MIU/ML (ref 0.27–4.2)
VLDLC SERPL CALC-MCNC: 37 MG/DL

## 2019-06-19 ENCOUNTER — OFFICE VISIT (OUTPATIENT)
Dept: INTERNAL MEDICINE | Facility: CLINIC | Age: 59
End: 2019-06-19

## 2019-06-19 VITALS
BODY MASS INDEX: 28.42 KG/M2 | WEIGHT: 170.6 LBS | TEMPERATURE: 98 F | HEIGHT: 65 IN | HEART RATE: 81 BPM | OXYGEN SATURATION: 98 % | DIASTOLIC BLOOD PRESSURE: 70 MMHG | SYSTOLIC BLOOD PRESSURE: 110 MMHG

## 2019-06-19 DIAGNOSIS — R00.2 HEART PALPITATIONS: ICD-10-CM

## 2019-06-19 DIAGNOSIS — Z00.00 HEALTH CARE MAINTENANCE: Primary | ICD-10-CM

## 2019-06-19 DIAGNOSIS — E78.5 HYPERLIPIDEMIA, UNSPECIFIED HYPERLIPIDEMIA TYPE: ICD-10-CM

## 2019-06-19 DIAGNOSIS — K21.9 GASTROESOPHAGEAL REFLUX DISEASE WITHOUT ESOPHAGITIS: ICD-10-CM

## 2019-06-19 PROCEDURE — 99396 PREV VISIT EST AGE 40-64: CPT | Performed by: INTERNAL MEDICINE

## 2019-06-19 NOTE — PROGRESS NOTES
Subjective   Rosalinda Almaraz is a 59 y.o. female and is here for a comprehensive physical exam. The patient reports problems - perimenopause.  Menopausal sx controlled with current estrogen replacement  Pt has been compliant with meds for GERD.  No sx as long as pt takes medicine as prescribed.  No epigastric pain or reflux sx  She cont to have occas palp    Pt is UTD with annual gyn exam and mammo she is utd with gyn    Do you take any herbs or supplements that were not prescribed by a doctor? 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   Tobacco Use   • Smoking status: Never Smoker   • Smokeless tobacco: Never Used   Substance and Sexual Activity   • Alcohol use: Yes     Alcohol/week: 0.6 - 1.2 oz     Types: 1 - 2 Glasses of wine per week     Comment: daily   • Drug use: No   • Sexual activity: Yes     Partners: Male     Birth control/protection: Post-menopausal   Social History Narrative    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 [employer]       Family History:   Family History   Problem Relation Age of Onset   • Cancer Father 63        lung-smoker   • Aneurysm Mother 73        AAA   • Scleroderma Maternal Aunt    • Leukemia Maternal Grandmother    • Aneurysm Maternal Grandfather         Cerebral   • Breast cancer Neg Hx    • Ovarian cancer Neg Hx    • Colon cancer Neg Hx    • Deep vein thrombosis Neg Hx    • Pulmonary embolism Neg Hx        Past Medical History:   Past Medical History:   Diagnosis Date   • Asthma    • Gastritis    • GERD (gastroesophageal reflux disease)    • IBS (irritable bowel syndrome)    • IBS (irritable bowel syndrome)    • Palpitations    • Panic attacks    • Premature atrial  "contractions    • Right sided abdominal pain    • SOB (shortness of breath)    • SVT (supraventricular tachycardia) (CMS/HCC)     14 day Zio Patch starting on 12/22/17: 33 brief runs of SVT (longest 20 beats in length at 153 bpm).    • Tingling    • Urinary tract infection            Review of Systems    A comprehensive review of systems was negative.    Objective   /70 (BP Location: Left arm, Patient Position: Sitting, Cuff Size: Adult)   Pulse 81   Temp 98 °F (36.7 °C) (Oral)   Ht 165.1 cm (65\")   Wt 77.4 kg (170 lb 9.6 oz)   SpO2 98%   BMI 28.39 kg/m²     General Appearance:    Alert, cooperative, no distress, appears stated age   Head:    Normocephalic, without obvious abnormality, atraumatic   Eyes:    PERRL, conjunctiva/corneas clear, EOM's intact, fundi     benign, both eyes   Ears:    Normal TM's and external ear canals, both ears   Nose:   Nares normal, septum midline, mucosa normal, no drainage    or sinus tenderness   Throat:   Lips, mucosa, and tongue normal; teeth and gums normal   Neck:   Supple, symmetrical, trachea midline, no adenopathy;     thyroid:  no enlargement/tenderness/nodules; no carotid    bruit or JVD   Back:     Symmetric, no curvature, ROM normal, no CVA tenderness   Lungs:     Clear to auscultation bilaterally, respirations unlabored   Chest Wall:    No tenderness or deformity    Heart:    Regular rate and rhythm, S1 and S2 normal, no murmur, rub   or gallop       Abdomen:     Soft, non-tender, bowel sounds active all four quadrants,     no masses, no organomegaly           Extremities:   Extremities normal, atraumatic, no cyanosis or edema   Pulses:   2+ and symmetric all extremities   Skin:   Skin color, texture, turgor normal, no rashes or lesions   Lymph nodes:   Cervical, supraclavicular, and axillary nodes normal   Neurologic:   CNII-XII intact, normal strength, sensation and reflexes     throughout       Medications:   Current Outpatient Medications:   •  ASMANEX 60 " METERED DOSES 220 MCG/INH inhaler, Inhale 1 puff Daily., Disp: , Rfl:   •  azelastine (ASTEPRO) 0.15 % solution nasal spray, 1 spray into each nostril Daily., Disp: , Rfl:   •  desloratadine (CLARINEX) 5 MG tablet, , Disp: , Rfl:   •  diltiaZEM (CARDIZEM) 30 MG tablet, TAKE 1 TABLET TWICE A DAY, Disp: 180 tablet, Rfl: 3  •  estradiol (ESTRACE) 0.1 MG/GM vaginal cream, Insert 1 g into the vagina 2 (Two) Times a Week., Disp: 45 g, Rfl: 6  •  estradiol-norethindrone (COMBIPATCH) 0.05-0.14 MG/DAY patch, Place 1 patch on the skin as directed by provider 2 (Two) Times a Week., Disp: 24 each, Rfl: 3  •  FLUTICASONE PROPIONATE, NASAL, NA, 1 spray into each nostril Daily., Disp: , Rfl:   •  Glucosamine-Chondroit-Vit C-Mn (GLUCOSAMINE CHONDR 1500 COMPLX PO), Take 2 tablets by mouth daily., Disp: , Rfl:   •  lansoprazole (PREVACID) 30 MG capsule, Take 1 capsule by mouth Daily., Disp: 90 capsule, Rfl: 3  •  montelukast (SINGULAIR) 10 MG tablet, Take 10 mg by mouth Every Night., Disp: , Rfl:   •  Multiple Vitamin (MULTI VITAMIN DAILY PO), Take 1 tablet by mouth daily., Disp: , Rfl:   •  NON FORMULARY, f d guard., Disp: , Rfl:   •  PROAIR  (90 BASE) MCG/ACT inhaler, , Disp: , Rfl:   •  Turmeric 500 MG tablet, Take 500 mg by mouth Daily., Disp: , Rfl:   •  Unable to find, 1 each 1 (One) Time. IB GUARD, Disp: , Rfl:   •  uribel (URO-MP) 118 MG capsule capsule, TK 1 C PO  TID PRN, Disp: , Rfl: 12  •  Wheat Dextrin (BENEFIBER) powder, Take 1 application by mouth Daily., Disp: , Rfl:   •  XIIDRA 5 % solution, Administer 1 drop to both eyes 2 (Two) Times a Day., Disp: , Rfl:        Assessment/Plan   Healthy female exam.     1. Healthcare Maintenance:  2. Patient Counseling:  --Nutrition: Stressed importance of moderation in sodium/caffeine intake, saturated fat and cholesterol, caloric balance, sufficient intake of fresh fruits, vegetables, fiber, calcium and vit D  --Exercise: she has been exercising some and plans to increase    --Substance Abuse: no tob daily etoh  --Dental health: she does go to the dentist reg  --Immunizations reviewed. She is utd  --Discussed benefits of screening colonoscopy. utd  3.  Palpatations-  Sees GI  Ok with cardizem  4. Elevated cholesterol- she will work on diet and exercise

## 2019-07-08 ENCOUNTER — TRANSCRIBE ORDERS (OUTPATIENT)
Dept: ADMINISTRATIVE | Facility: HOSPITAL | Age: 59
End: 2019-07-08

## 2019-07-08 DIAGNOSIS — Z13.6 SCREENING FOR AAA (ABDOMINAL AORTIC ANEURYSM): Primary | ICD-10-CM

## 2019-07-15 ENCOUNTER — HOSPITAL ENCOUNTER (OUTPATIENT)
Dept: CARDIOLOGY | Facility: HOSPITAL | Age: 59
Discharge: HOME | End: 2019-07-15
Admitting: INTERNAL MEDICINE

## 2019-07-15 VITALS
HEART RATE: 80 BPM | HEIGHT: 63 IN | BODY MASS INDEX: 30.3 KG/M2 | WEIGHT: 171 LBS | DIASTOLIC BLOOD PRESSURE: 59 MMHG | SYSTOLIC BLOOD PRESSURE: 115 MMHG

## 2019-07-15 DIAGNOSIS — Z13.6 SCREENING FOR AAA (ABDOMINAL AORTIC ANEURYSM): ICD-10-CM

## 2019-07-15 LAB
BH CV ECHO MEAS - DIST AO DIAM: 1.47 CM
BH CV VAS BP LEFT ARM: NORMAL MMHG
BH CV VAS BP RIGHT ARM: NORMAL MMHG
BH CV XLRA MEAS - MID AO DIAM: 1.78 CM
BH CV XLRA MEAS - PAD LEFT ABI DP: 1.18
BH CV XLRA MEAS - PAD LEFT ABI PT: 1.21
BH CV XLRA MEAS - PAD LEFT ARM: 113 MMHG
BH CV XLRA MEAS - PAD LEFT LEG DP: 136 MMHG
BH CV XLRA MEAS - PAD LEFT LEG PT: 140 MMHG
BH CV XLRA MEAS - PAD RIGHT ABI DP: 1.18
BH CV XLRA MEAS - PAD RIGHT ABI PT: 1.21
BH CV XLRA MEAS - PAD RIGHT ARM: 115 MMHG
BH CV XLRA MEAS - PAD RIGHT LEG DP: 136 MMHG
BH CV XLRA MEAS - PAD RIGHT LEG PT: 140 MMHG
BH CV XLRA MEAS - PROX AO DIAM: 2.1 CM
BH CV XLRA MEAS LEFT ICA/CCA RATIO: 1
BH CV XLRA MEAS LEFT MID CCA PSV: NORMAL CM/SEC
BH CV XLRA MEAS LEFT MID ICA PSV: NORMAL CM/SEC
BH CV XLRA MEAS LEFT PROX ECA PSV: NORMAL CM/SEC
BH CV XLRA MEAS RIGHT ICA/CCA RATIO: 0.81
BH CV XLRA MEAS RIGHT MID CCA PSV: NORMAL CM/SEC
BH CV XLRA MEAS RIGHT MID ICA PSV: NORMAL CM/SEC
BH CV XLRA MEAS RIGHT PROX ECA PSV: NORMAL CM/SEC

## 2019-07-15 PROCEDURE — 93799 UNLISTED CV SVC/PROCEDURE: CPT

## 2019-07-15 RX ORDER — ESTRADIOL 0.1 MG/G
1 CREAM VAGINAL 2 TIMES WEEKLY
Qty: 45 G | Refills: 6 | Status: CANCELLED | OUTPATIENT
Start: 2019-07-15

## 2019-07-15 NOTE — TELEPHONE ENCOUNTER
[facility] sent her the incorrect amount, to her, she is wondering if you could refill this. Or possibly send it to her [facility] for just one refill.

## 2019-07-16 RX ORDER — ESTRADIOL 0.1 MG/G
1 CREAM VAGINAL 2 TIMES WEEKLY
Qty: 45 G | Refills: 6 | Status: SHIPPED | OUTPATIENT
Start: 2019-07-18 | End: 2020-03-26 | Stop reason: SDUPTHER

## 2019-07-29 ENCOUNTER — OFFICE VISIT (OUTPATIENT)
Dept: CARDIOLOGY | Facility: CLINIC | Age: 59
End: 2019-07-29

## 2019-07-29 VITALS
HEART RATE: 77 BPM | SYSTOLIC BLOOD PRESSURE: 118 MMHG | WEIGHT: 172.2 LBS | BODY MASS INDEX: 30.51 KG/M2 | HEIGHT: 63 IN | DIASTOLIC BLOOD PRESSURE: 78 MMHG

## 2019-07-29 DIAGNOSIS — R00.2 PALPITATIONS: ICD-10-CM

## 2019-07-29 DIAGNOSIS — I47.1 SVT (SUPRAVENTRICULAR TACHYCARDIA) (HCC): Primary | ICD-10-CM

## 2019-07-29 DIAGNOSIS — I49.1 PREMATURE ATRIAL CONTRACTIONS: ICD-10-CM

## 2019-07-29 PROCEDURE — 93000 ELECTROCARDIOGRAM COMPLETE: CPT | Performed by: INTERNAL MEDICINE

## 2019-07-29 PROCEDURE — 99213 OFFICE O/P EST LOW 20 MIN: CPT | Performed by: INTERNAL MEDICINE

## 2019-08-08 ENCOUNTER — OFFICE VISIT (OUTPATIENT)
Dept: GASTROENTEROLOGY | Facility: CLINIC | Age: 59
End: 2019-08-08

## 2019-08-08 VITALS — TEMPERATURE: 98.3 F | WEIGHT: 171.4 LBS | HEIGHT: 63 IN | BODY MASS INDEX: 30.37 KG/M2

## 2019-08-08 DIAGNOSIS — R14.0 ABDOMINAL BLOATING: Primary | ICD-10-CM

## 2019-08-08 DIAGNOSIS — K21.9 GASTROESOPHAGEAL REFLUX DISEASE, ESOPHAGITIS PRESENCE NOT SPECIFIED: ICD-10-CM

## 2019-08-08 DIAGNOSIS — K63.89 SMALL INTESTINAL BACTERIAL OVERGROWTH: ICD-10-CM

## 2019-08-08 DIAGNOSIS — K58.0 IRRITABLE BOWEL SYNDROME WITH DIARRHEA: ICD-10-CM

## 2019-08-08 PROCEDURE — 99214 OFFICE O/P EST MOD 30 MIN: CPT | Performed by: NURSE PRACTITIONER

## 2019-08-08 NOTE — PROGRESS NOTES
Chief Complaint   Patient presents with   • SIBO       Rosalinda Boone is a  59 y.o. female here for a follow up visit for bloating.     HPI  60 yo f presents today for follow up visit for abd bloating and SIBO. She is a patient of Dr. Real. She was last seen in the office on 4/17/19. She had been having issues with abd bloating and tested POSITIVE for SIBO. She took one round of Xifaxan and reports it helped maybe a tiny bit but it made her IBS much worse. It took 4 months for her IBS-D to calm down after the xifaxan. She tells me now the bowels are good but she continues to have issues with bloating. She also has hx GERD but admits she does well with Prevacid. She denies any dysphagia, reflux, N&V, diarrhea, constipation, rectal bleeding or melena.     She had her last EGD/colonoscopy 12/2016.   Past Medical History:   Diagnosis Date   • Asthma    • Gastritis    • GERD (gastroesophageal reflux disease)    • IBS (irritable bowel syndrome)    • IBS (irritable bowel syndrome)    • Palpitations    • Panic attacks    • Premature atrial contractions    • Right sided abdominal pain    • SOB (shortness of breath)    • SVT (supraventricular tachycardia) (CMS/HCC)     14 day Zio Patch starting on 12/22/17: 33 brief runs of SVT (longest 20 beats in length at 153 bpm).    • Tingling    • Urinary tract infection        Past Surgical History:   Procedure Laterality Date   • COLONOSCOPY N/A 12/5/2016    NBIH,    • D&C WITH SUCTION      for DUB   • ENDOSCOPY N/A 12/5/2016    z line regular, 40 cm from incisors,  granular gastric mucosa, chronic gastritis   • LASIK         Scheduled Meds:    Continuous Infusions:  No current facility-administered medications for this visit.     PRN Meds:.    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   Tobacco Use   • Smoking status: Never Smoker   • Smokeless tobacco: Never Used   Substance and Sexual Activity   • Alcohol use: Yes     Alcohol/week: 0.6 - 1.2 oz     Types: 1 - 2 Glasses of wine per week     Comment: daily   • Drug use: No   • Sexual activity: Yes     Partners: Male     Birth control/protection: Post-menopausal   Social History Narrative    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   HENT: Negative for nosebleeds, postnasal drip, sore throat, trouble swallowing and voice change.    Respiratory: Negative for cough, choking, chest tightness, shortness of breath and wheezing.    Cardiovascular: Negative for chest pain, palpitations and leg swelling.   Gastrointestinal: Positive for abdominal distention. Negative for abdominal pain, anal bleeding, blood in stool, constipation, diarrhea, nausea, rectal pain and vomiting.   Endocrine: Negative for polydipsia, polyphagia and polyuria.   Musculoskeletal: Negative for gait problem.   Skin: Negative for rash and wound.   Allergic/Immunologic: Negative for food allergies.   Neurological: Negative for dizziness, speech  difficulty and light-headedness.   Psychiatric/Behavioral: Negative for confusion, self-injury, sleep disturbance and suicidal ideas.       Vitals:    08/08/19 1548   Temp: 98.3 °F (36.8 °C)       Physical Exam   Constitutional: She is oriented to person, place, and time. She appears well-developed and well-nourished. She does not appear ill. No distress.   HENT:   Head: Normocephalic.   Eyes: Pupils are equal, round, and reactive to light.   Cardiovascular: Normal rate, regular rhythm and normal heart sounds.   Pulmonary/Chest: Effort normal and breath sounds normal.   Abdominal: Soft. Bowel sounds are normal. She exhibits distension. She exhibits no ascites and no mass. There is no hepatosplenomegaly. There is no tenderness. There is no rebound and no guarding. No hernia.   Musculoskeletal: Normal range of motion.   Neurological: She is alert and oriented to person, place, and time.   Skin: Skin is warm and dry.   Psychiatric: She has a normal mood and affect. Her speech is normal and behavior is normal. Judgment normal.       No images are attached to the encounter.    Assessment & plan    1. Abdominal bloating    2. Small intestinal bacterial overgrowth    3. Irritable bowel syndrome with diarrhea    4. Gastroesophageal reflux disease, esophagitis presence not specified    Still having issues with bloating. Will have her trial a good probiotic (handout given) and consider a low carb diet. GERD is well controlled on daily PPI. Call office with update in 1-2 weeks. Follow up with me in 2 months.

## 2019-08-19 NOTE — PROGRESS NOTES
"Date of Office Visit: 2019  Encounter Provider: Moo Samuels MD  Place of Service: [facility] CARDIOLOGY  Patient Name: Rosalinda Boone  :1960    Chief complaint: Follow-up for SVT, PACs, and palpitations.    History of Present Illness:    Dear Dr. Sosa:     I again had the pleasure of seeing your patient in cardiology office on 2019.  As you   well know, she is a very pleasant 59 year-old white female with a past medical history   significant for SVT, asthma, GERD, and gastritis who presents for follow-up.  I initially   saw the patient on 2017.  At that point, she had been having more frequent   palpitations which she described as a \"flipping\" sensation which occurred for a few   seconds and then resolved.  She would typically feels as if she needed to take a deep   breath or cough when these occurred.  She had recently started menopause, and had   recently gone off of hormone replacement therapy.  Her symptoms did correlate   somewhat with stopping the hormone therapy.  She had also had occasional chest   tightness and shortness of breath.  Her TSH had been checked in 2017 was  normal at 2.15.  She did wear a Holter monitor on 2017 which showed only rare   PVCs and was essentially normal as well.  She wore a 14 day Zio Patch starting on   2017.  This showed 33 brief runs of SVT, with the longest being 20 beats in   length at 153 bpm.  The fastest of these was 8 beats in length at 200 bpm.  She did   also undergo a stress echocardiogram on 1/10/2018 which showed no evidence of   ischemia, an ejection fraction of 60-65%, normal diastolic function, and no significant   valvular disease.  She was subsequently started on diltiazem at 30 mg twice a day   after the SVT was diagnosed.  She did later develop palpitations again, and wore a   second event recorder on 2018.  This showed occasional PACs, and very brief   runs of SVT, ranging " between 6-8 beats in length.  The palpitations correlated to the   very brief runs of SVT, as well as occasionally to the isolated PACs.     The patient presents today for follow-up.  Overall, she is doing well.  She states that   she has had less than 5 episodes of palpitations where she had to catch her breath   since her last appointment.  She has not had any chest pain.  She has some baseline   shortness of breath from asthma, although this has not changed.    Past Medical History:   Diagnosis Date   • Asthma    • Gastritis    • GERD (gastroesophageal reflux disease)    • IBS (irritable bowel syndrome)    • Palpitations    • Panic attacks    • Premature atrial contractions    • Right sided abdominal pain    • SOB (shortness of breath)    • SVT (supraventricular tachycardia) (CMS/HCC)     14 day Zio Patch starting on 12/22/17: 33 brief runs of SVT (longest 20 beats in length at 153 bpm).    • Tingling    • Urinary tract infection        Past Surgical History:   Procedure Laterality Date   • COLONOSCOPY N/A 12/5/2016    [facility], [place],    • D&C WITH SUCTION      for DUB   • ENDOSCOPY N/A 12/5/2016    z line regular, 40 cm from incisors,  granular gastric mucosa, chronic gastritis   • LASIK         Current Outpatient Medications on File Prior to Visit   Medication Sig Dispense Refill   • ASMANEX 60 METERED DOSES 220 MCG/INH inhaler Inhale 1 puff Daily.     • azelastine (ASTEPRO) 0.15 % solution nasal spray 1 spray into each nostril Daily.     • desloratadine (CLARINEX) 5 MG tablet      • diltiaZEM (CARDIZEM) 30 MG tablet TAKE 1 TABLET TWICE A  tablet 3   • estradiol (ESTRACE) 0.1 MG/GM vaginal cream Insert 1 g into the vagina 2 (Two) Times a Week. 45 g 6   • estradiol-norethindrone (COMBIPATCH) 0.05-0.14 MG/DAY patch Place 1 patch on the skin as directed by provider 2 (Two) Times a Week. 24 each 3   • FLUTICASONE PROPIONATE, NASAL, NA 1 spray into each nostril Daily.     • Glucosamine-Chondroit-Vit C-Mn (GLUCOSAMINE  CHONDR 1500 COMPLX PO) Take 2 tablets by mouth daily.     • lansoprazole (PREVACID) 30 MG capsule Take 1 capsule by mouth Daily. 90 capsule 3   • montelukast (SINGULAIR) 10 MG tablet Take 10 mg by mouth Every Night.     • Multiple Vitamin (MULTI VITAMIN DAILY PO) Take 1 tablet by mouth daily.     • NON FORMULARY f d guard.     • PROAIR  (90 BASE) MCG/ACT inhaler      • Turmeric 500 MG tablet Take 500 mg by mouth Daily.     • Unable to find 1 each 1 (One) Time. IB GUARD     • uribel (URO-MP) 118 MG capsule capsule TK 1 C PO  TID PRN  12   • Wheat Dextrin (BENEFIBER) powder Take 1 application by mouth Daily.     • XIIDRA 5 % solution Administer 1 drop to both eyes 2 (Two) Times a Day.       No current facility-administered medications on file prior to visit.      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   Tobacco Use   • Smoking status: Never Smoker   • Smokeless tobacco: Never Used   Substance and Sexual Activity   • Alcohol use: Yes     Alcohol/week: 0.6 - 1.2 oz     Types: 1 - 2 Glasses of wine per week     Comment: daily   • Drug use: No   • Sexual activity: Yes     Partners: Male     Birth control/protection: Post-menopausal   Social History Narrative    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   Respiratory: Positive for shortness of breath.    Musculoskeletal: Positive for joint pain.   All other systems reviewed and are negative.     Objective:     Vitals:    07/29/19 1208   BP: 118/78   Pulse: 77   Weight: 78.1 kg (172 lb 3.2 oz)   Height: 160 cm (62.99\")     Body mass index is 30.51 kg/m².    Physical Exam   Constitutional: She is oriented to person, place, and time. She appears well-developed and well-nourished.   HENT:   Head: Normocephalic and atraumatic.   Eyes: Conjunctivae are normal.   Neck: Neck supple.   Cardiovascular: Normal rate and regular rhythm. Exam reveals no gallop and no friction rub.   No murmur heard.  Pulmonary/Chest: Effort normal and breath sounds normal.   Abdominal: Soft. There is no tenderness.   Musculoskeletal: She exhibits no edema.   Neurological: She is alert and oriented to person, place, and time.   Skin: Skin is warm.   Psychiatric: She has a normal mood and affect. Her behavior is normal.     Lab Review:     ECG 12 Lead  Date/Time: 7/29/2019 12:09 PM  Performed by: Moo Samuels MD  Authorized by: Moo Samuels MD   Comparison: compared with previous ECG from 12/22/2017  Similar to previous ECG  Rhythm: sinus rhythm  Rate: normal  BPM: 77  Other findings: non-specific ST-T wave changes and poor R wave progression    Clinical impression: abnormal EKG            Cardiac Procedures:  1. 14 day Zio Patch starting on 12/22/17: 33 brief runs of SVT, with the longest being   20 beats in length at 153 bpm.  The fastest was 8 beats at 200 bpm.    2. Stress echocardiogram on 1/10/18: There was no evidence of ischemia.  The   ejection " fraction was 60-65%.  Diastolic function was normal.  The right ventricle was   normal.  There was no significant valvular disease.  3.  Event recorder on 6/20/2018: There were several very brief runs of paroxysmal SVT,   ranging from 6-8 beats in length.  PACs occurred occasionally.  Most of the episodes   of skipped beats correlated very brief runs of paroxysmal SVT.  Occasional palpitations   correlated to isolated PACs.       Assessment:       Diagnosis Plan   1. SVT (supraventricular tachycardia) (CMS/HCC)     2. Premature atrial contractions     3. Palpitations       Plan:       Overall, she seems to be doing well.  She is still taking the diltiazem at 30 mg twice a day.  This seems to help with the palpitations in general.  She has had only 5 episodes where she has felt like she has to catch her breath.  These are likely brief bursts of SVT, which I reassured her are benign.  I will not use beta-blockers on her given her baseline asthma.  If she has more palpitations in the future which worsen or change, she will let me know.  Otherwise, I will see her back in the office in 1 year.

## 2019-10-11 ENCOUNTER — OFFICE VISIT (OUTPATIENT)
Dept: GASTROENTEROLOGY | Facility: CLINIC | Age: 59
End: 2019-10-11

## 2019-10-11 VITALS
HEIGHT: 63 IN | BODY MASS INDEX: 31.08 KG/M2 | DIASTOLIC BLOOD PRESSURE: 72 MMHG | TEMPERATURE: 98.2 F | SYSTOLIC BLOOD PRESSURE: 124 MMHG | WEIGHT: 175.4 LBS

## 2019-10-11 DIAGNOSIS — R14.0 ABDOMINAL BLOATING: ICD-10-CM

## 2019-10-11 DIAGNOSIS — K21.9 GASTROESOPHAGEAL REFLUX DISEASE, ESOPHAGITIS PRESENCE NOT SPECIFIED: Primary | ICD-10-CM

## 2019-10-11 DIAGNOSIS — K58.0 IRRITABLE BOWEL SYNDROME WITH DIARRHEA: ICD-10-CM

## 2019-10-11 DIAGNOSIS — K63.89 SMALL INTESTINAL BACTERIAL OVERGROWTH: ICD-10-CM

## 2019-10-11 PROCEDURE — 99214 OFFICE O/P EST MOD 30 MIN: CPT | Performed by: NURSE PRACTITIONER

## 2019-10-11 NOTE — PROGRESS NOTES
Chief Complaint   Patient presents with   • Bloated       Rosalinda Boone is a  59 y.o. female here for a follow up visit for GERD and bloating.    HPI  59-year-old female presents today for follow-up visit for GERD, abdominal bloating and IBS-D.  She is a patient of Dr. Real.  She was last seen in the office on 8/8/2019.  She is a history of IBS-D with SIBO and abdominal bloating and admits IBgard OTC helps but is not 100%.  She is happy to report that since starting a probiotic she is feeling much better.  Her bowels are moving much more normally.  But she still having issues with bloating.  She also has a history of GERD/chronic gastritis and reports that she has been having some more reflux symptoms especially worse at night.  She takes Prevacid 30 mill grams once daily.  She denies any dysphagia, abdominal pain, nausea and vomiting, constipation, rectal bleeding or melena.  Admits her appetite is good and her weight is stable.  Her last EGD and colonoscopy was done on 12/5/2016.  Past Medical History:   Diagnosis Date   • Asthma    • Gastritis    • GERD (gastroesophageal reflux disease)    • IBS (irritable bowel syndrome)    • Palpitations    • Panic attacks    • Premature atrial contractions    • Right sided abdominal pain    • SOB (shortness of breath)    • SVT (supraventricular tachycardia) (CMS/HCC)     14 day Zio Patch starting on 12/22/17: 33 brief runs of SVT (longest 20 beats in length at 153 bpm).    • Tingling    • Urinary tract infection        Past Surgical History:   Procedure Laterality Date   • COLONOSCOPY N/A 12/5/2016    [facility],    • D&C WITH SUCTION      for DUB   • ENDOSCOPY N/A 12/5/2016    z line regular, 40 cm from incisors,  granular gastric mucosa, chronic gastritis   • LASIK         Scheduled Meds:    Continuous Infusions:  No current facility-administered medications for this visit.     PRN Meds:.    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   Tobacco Use   • Smoking status: Never Smoker   • Smokeless tobacco: Never Used   Substance and Sexual Activity   • Alcohol use: Yes     Alcohol/week: 0.6 - 1.2 oz     Types: 1 - 2 Glasses of wine per week     Comment: daily   • Drug use: No   • Sexual activity: Yes     Partners: Male     Birth control/protection: Post-menopausal   Social History Narrative    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   HENT: Negative for nosebleeds, postnasal drip, sore throat, trouble swallowing and voice change.    Respiratory: Negative for cough, choking, chest tightness, shortness of breath and wheezing.    Cardiovascular: Negative for chest pain, palpitations and leg swelling.   Gastrointestinal: Positive for abdominal distention. Negative for abdominal pain, anal bleeding, blood in stool, constipation, diarrhea, nausea, rectal pain and vomiting.   Endocrine: Negative for polydipsia, polyphagia and polyuria.   Musculoskeletal: Negative for  gait problem.   Skin: Negative for rash and wound.   Allergic/Immunologic: Negative for food allergies.   Neurological: Negative for dizziness, speech difficulty and light-headedness.   Psychiatric/Behavioral: Negative for confusion, self-injury, sleep disturbance and suicidal ideas.       Vitals:    10/11/19 1315   BP: 124/72   Temp: 98.2 °F (36.8 °C)       Physical Exam   Constitutional: She is oriented to person, place, and time. She appears well-developed and well-nourished. She does not appear ill. No distress.   HENT:   Head: Normocephalic.   Eyes: Pupils are equal, round, and reactive to light.   Cardiovascular: Normal rate, regular rhythm and normal heart sounds.   Pulmonary/Chest: Effort normal and breath sounds normal.   Abdominal: Soft. Bowel sounds are normal. She exhibits distension. She exhibits no mass. There is no hepatosplenomegaly. There is no tenderness. There is no rebound and no guarding. No hernia.   Musculoskeletal: Normal range of motion.   Neurological: She is alert and oriented to person, place, and time.   Skin: Skin is warm and dry.   Psychiatric: She has a normal mood and affect. Her speech is normal and behavior is normal. Judgment normal.       No images are attached to the encounter.    Assessment and plan    1. Gastroesophageal reflux disease, esophagitis presence not specified    2. Irritable bowel syndrome with diarrhea    3. Abdominal bloating    4. Small intestinal bacterial overgrowth    IBS-D seems much better on probiotics.  Bloating is still an issue.  GERD is not well controlled.  Wonder if the 2 are not related?  Would advise her to change the Prevacid from taking it in the morning to taking it in the evening.  She can even take it twice a day if she needs to.  Continue GERD precautions.  Increase water intake.  Continue to avoid dairy.  Patient to call the office in the next week or 2 with an update.  Patient to follow-up with me in the office in 3 months.

## 2019-10-29 ENCOUNTER — TELEPHONE (OUTPATIENT)
Dept: GASTROENTEROLOGY | Facility: CLINIC | Age: 59
End: 2019-10-29

## 2019-10-29 DIAGNOSIS — K21.9 GASTROESOPHAGEAL REFLUX DISEASE WITHOUT ESOPHAGITIS: ICD-10-CM

## 2019-10-29 RX ORDER — LANSOPRAZOLE 30 MG/1
30 CAPSULE, DELAYED RELEASE ORAL 2 TIMES DAILY
Qty: 180 CAPSULE | Refills: 3 | Status: SHIPPED | OUTPATIENT
Start: 2019-10-29 | End: 2020-07-10 | Stop reason: SDUPTHER

## 2019-10-29 NOTE — TELEPHONE ENCOUNTER
Regarding: Visit Follow-Up Question  Contact: [phone]  ----- Message from Mychart, Generic sent at 10/29/2019  9:45 AM EDT -----    Emerson Erwin,    I apologize for the delayed response.  I had stomach issues the week after I saw you and I wanted to give doubling the lansoprazole a fair chance. I think I just had an IBS flair.  I had alot of indigestion, heartburn and stomach pain a bloating for about 5 days then things returned to normal.  I also was attending a convention for work and was eating on the go and stressed which probably contributed to my issues.  I have been taking lansoprazole twice daily as you recommended and I do have significantly less heartburn, especially at night.  It does seem to be helping with bloating as well.  I would like to have my prescription increased to twice daily dosage as you recommended.  I have to use [facility].  Thank you!

## 2019-11-01 ENCOUNTER — OFFICE VISIT (OUTPATIENT)
Dept: INTERNAL MEDICINE | Facility: CLINIC | Age: 59
End: 2019-11-01

## 2019-11-01 VITALS
HEART RATE: 85 BPM | TEMPERATURE: 98.2 F | DIASTOLIC BLOOD PRESSURE: 78 MMHG | WEIGHT: 174.1 LBS | HEIGHT: 63 IN | SYSTOLIC BLOOD PRESSURE: 112 MMHG | BODY MASS INDEX: 30.85 KG/M2 | OXYGEN SATURATION: 98 %

## 2019-11-01 DIAGNOSIS — R30.0 DYSURIA: Primary | ICD-10-CM

## 2019-11-01 LAB
BILIRUB BLD-MCNC: NEGATIVE MG/DL
CLARITY, POC: CLEAR
COLOR UR: ABNORMAL
GLUCOSE UR STRIP-MCNC: NEGATIVE MG/DL
KETONES UR QL: NEGATIVE
LEUKOCYTE EST, POC: ABNORMAL
NITRITE UR-MCNC: NEGATIVE MG/ML
PH UR: 6.5 [PH] (ref 5–8)
PROT UR STRIP-MCNC: NEGATIVE MG/DL
RBC # UR STRIP: ABNORMAL /UL
SP GR UR: 1.01 (ref 1–1.03)
UROBILINOGEN UR QL: NORMAL

## 2019-11-01 PROCEDURE — 81003 URINALYSIS AUTO W/O SCOPE: CPT | Performed by: NURSE PRACTITIONER

## 2019-11-01 PROCEDURE — 99213 OFFICE O/P EST LOW 20 MIN: CPT | Performed by: NURSE PRACTITIONER

## 2019-11-01 RX ORDER — NITROFURANTOIN 25; 75 MG/1; MG/1
100 CAPSULE ORAL 2 TIMES DAILY
Qty: 14 CAPSULE | Refills: 0 | Status: SHIPPED | OUTPATIENT
Start: 2019-11-01 | End: 2020-01-10

## 2019-11-01 NOTE — PROGRESS NOTES
Subjective   Rosalinda Boone is a 59 y.o. female. Patient is here today for   Chief Complaint   Patient presents with   • Urinary Tract Infection     PT C/O CRAMPING ON LOWER ABDOMEN, PRESSURE AND URGENCY X MONDAY. PT IS TAKING URIBEL.   .    History of Present Illness   C/o urinary urgency x 4 days associated with frequency.   She has been taking Uribel since Monday which usually helps, but it hasn't this time.  She has seen a urologist who had told her to take the Uribel as needed.  She actually was started on hormone replacement therapy by her gynecologist about a year ago and that is really helped with the frequency of her UTIs    The following portions of the patient's history were reviewed and updated as appropriate: allergies, current medications, past family history, past medical history, past social history, past surgical history and problem list.    Review of Systems   Constitutional: Negative.    Respiratory: Negative.    Cardiovascular: Negative.    Genitourinary: Positive for dysuria, frequency and urgency.       Objective   Vitals:    11/01/19 0945   BP: 112/78   Pulse: 85   Temp: 98.2 °F (36.8 °C)   SpO2: 98%     Physical Exam   Constitutional: She is oriented to person, place, and time. Vital signs are normal. She appears well-developed and well-nourished.   Cardiovascular: Normal rate, regular rhythm and normal heart sounds.   Pulmonary/Chest: Effort normal and breath sounds normal.   Neurological: She is alert and oriented to person, place, and time.   Skin: Skin is warm, dry and intact.   Psychiatric: She has a normal mood and affect. Her speech is normal and behavior is normal. Thought content normal.   Nursing note and vitals reviewed.    Results for orders placed or performed in visit on 11/01/19   POCT urinalysis dipstick, automated   Result Value Ref Range    Color Green (A) Yellow, Straw, Dark Yellow, Ama    Clarity, UA Clear Clear    Specific Gravity  1.015 1.005 - 1.030    pH, Urine 6.5  5.0 - 8.0    Leukocytes Small (1+) (A) Negative    Nitrite, UA Negative Negative    Protein, POC Negative Negative mg/dL    Glucose, UA Negative Negative, 1000 mg/dL (3+) mg/dL    Ketones, UA Negative Negative    Urobilinogen, UA Normal Normal    Bilirubin Negative Negative    Blood, UA Trace (A) Negative       Assessment/Plan   Rosalinda was seen today for urinary tract infection.    Diagnoses and all orders for this visit:    Dysuria  -     POCT urinalysis dipstick, automated  -     Urine Culture - Urine, Urine, Clean Catch  -     nitrofurantoin, macrocrystal-monohydrate, (MACROBID) 100 MG capsule; Take 1 capsule by mouth 2 (Two) Times a Day.    Patient can discontinue uribel when she starts macrobid.

## 2019-11-03 LAB
BACTERIA UR CULT: NORMAL
BACTERIA UR CULT: NORMAL

## 2020-01-10 ENCOUNTER — OFFICE VISIT (OUTPATIENT)
Dept: GASTROENTEROLOGY | Facility: CLINIC | Age: 60
End: 2020-01-10

## 2020-01-10 VITALS
BODY MASS INDEX: 30.55 KG/M2 | WEIGHT: 172.4 LBS | SYSTOLIC BLOOD PRESSURE: 122 MMHG | TEMPERATURE: 98.6 F | DIASTOLIC BLOOD PRESSURE: 80 MMHG | HEIGHT: 63 IN

## 2020-01-10 DIAGNOSIS — K21.9 GASTROESOPHAGEAL REFLUX DISEASE, ESOPHAGITIS PRESENCE NOT SPECIFIED: Primary | ICD-10-CM

## 2020-01-10 DIAGNOSIS — K58.0 IRRITABLE BOWEL SYNDROME WITH DIARRHEA: ICD-10-CM

## 2020-01-10 DIAGNOSIS — R14.0 ABDOMINAL BLOATING: ICD-10-CM

## 2020-01-10 PROCEDURE — 99214 OFFICE O/P EST MOD 30 MIN: CPT | Performed by: NURSE PRACTITIONER

## 2020-01-10 NOTE — PROGRESS NOTES
Chief Complaint   Patient presents with   • Heartburn   • Abdominal Pain       Rosalinda Boone is a  59 y.o. female here for a follow up visit for GERD.    HPI  59-year-old female presents today for follow-up visit for GERD and IBS-D.  She is a patient of Dr. Real.  She was last seen in the office on 10/11/2019.  History of GERD and admits she is doing really well on Prevacid 30 mg twice daily.  She denies any breakthrough reflux.  She also tells me her IBS is been doing much better since she went on a dairy free diet.  She is also on daily probiotics.  She denies any dysphagia, reflux, abdominal pain, nausea and vomiting, diarrhea, constipation, rectal bleeding or melena.  She was appetite is good and her weight is stable.  Her last EGD and colonoscopy was on 12/2016.  Past Medical History:   Diagnosis Date   • Asthma    • Gastritis    • GERD (gastroesophageal reflux disease)    • IBS (irritable bowel syndrome)    • Palpitations    • Panic attacks    • Premature atrial contractions    • Right sided abdominal pain    • SOB (shortness of breath)    • SVT (supraventricular tachycardia) (CMS/HCC)     14 day Zio Patch starting on 12/22/17: 33 brief runs of SVT (longest 20 beats in length at 153 bpm).    • Tingling    • Urinary tract infection        Past Surgical History:   Procedure Laterality Date   • COLONOSCOPY N/A 12/5/2016    [facility],    • D&C WITH SUCTION      for DUB   • ENDOSCOPY N/A 12/5/2016    z line regular, 40 cm from incisors,  granular gastric mucosa, chronic gastritis   • LASIK         Scheduled Meds:    Continuous Infusions:  No current facility-administered medications for this visit.     PRN Meds:.    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   Tobacco Use   • Smoking status: Never Smoker   • Smokeless tobacco: Never Used   Substance and Sexual Activity   • Alcohol use: Yes     Alcohol/week: 1.0 - 2.0 standard drinks     Types: 1 - 2 Glasses of wine per week     Comment: daily   • Drug use: No   • Sexual activity: Yes     Partners: Male     Birth control/protection: Post-menopausal   Social History Narrative    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   HENT: Negative for nosebleeds, postnasal drip, sore throat, trouble swallowing and voice change.    Respiratory: Negative for cough, choking, chest tightness, shortness of breath and wheezing.    Cardiovascular: Negative for chest pain, palpitations and leg swelling.   Gastrointestinal: Positive for abdominal distention. Negative for abdominal pain, anal bleeding, blood in stool, constipation, diarrhea, nausea, rectal pain and vomiting.   Endocrine: Negative for polydipsia, polyphagia and polyuria.   Musculoskeletal: Negative for gait problem.   Skin: Negative for rash and wound.   Allergic/Immunologic: Negative for food allergies.   Neurological: Negative for dizziness, speech difficulty and light-headedness.    Psychiatric/Behavioral: Negative for confusion, self-injury, sleep disturbance and suicidal ideas.       Vitals:    01/10/20 1126   BP: 122/80   Temp: 98.6 °F (37 °C)       Physical Exam   Constitutional: She is oriented to person, place, and time. She appears well-developed and well-nourished. She does not appear ill. No distress.   HENT:   Head: Normocephalic.   Eyes: Pupils are equal, round, and reactive to light.   Cardiovascular: Normal rate, regular rhythm and normal heart sounds.   Pulmonary/Chest: Effort normal and breath sounds normal.   Abdominal: Soft. Bowel sounds are normal. She exhibits distension. She exhibits no mass. There is no hepatosplenomegaly. There is no tenderness. There is no rebound and no guarding. No hernia.   Musculoskeletal: Normal range of motion.   Neurological: She is alert and oriented to person, place, and time.   Skin: Skin is warm and dry.   Psychiatric: She has a normal mood and affect. Her speech is normal and behavior is normal. Judgment normal.       No images are attached to the encounter.    Assessment and plan    1. Gastroesophageal reflux disease, esophagitis presence not specified    2. Irritable bowel syndrome with diarrhea    3. Abdominal bloating    GERD seems well controlled at this time on Prevacid 30 mg twice daily.  Patient to continue GERD precautions.  Continue probiotics.  Bowels seem to be moving well at this time.  Continue dairy free diet.  Patient to follow-up with GYN as planned.  Patient to follow-up with me in 3 months.  Patient to call the office with any issues.

## 2020-02-14 ENCOUNTER — PROCEDURE VISIT (OUTPATIENT)
Dept: OBSTETRICS AND GYNECOLOGY | Facility: CLINIC | Age: 60
End: 2020-02-14

## 2020-02-14 ENCOUNTER — OFFICE VISIT (OUTPATIENT)
Dept: OBSTETRICS AND GYNECOLOGY | Facility: CLINIC | Age: 60
End: 2020-02-14

## 2020-02-14 VITALS
BODY MASS INDEX: 30.48 KG/M2 | WEIGHT: 172 LBS | DIASTOLIC BLOOD PRESSURE: 78 MMHG | SYSTOLIC BLOOD PRESSURE: 110 MMHG | HEIGHT: 63 IN

## 2020-02-14 DIAGNOSIS — N95.0 POSTMENOPAUSAL BLEEDING: ICD-10-CM

## 2020-02-14 DIAGNOSIS — N95.0 PMB (POSTMENOPAUSAL BLEEDING): Primary | ICD-10-CM

## 2020-02-14 DIAGNOSIS — Z13.9 SCREENING FOR CONDITION: ICD-10-CM

## 2020-02-14 DIAGNOSIS — N93.9 VAGINAL BLEEDING: ICD-10-CM

## 2020-02-14 DIAGNOSIS — R10.2 PELVIC PAIN IN FEMALE: Primary | ICD-10-CM

## 2020-02-14 DIAGNOSIS — R10.31 RIGHT LOWER QUADRANT PAIN: ICD-10-CM

## 2020-02-14 LAB
BILIRUB BLD-MCNC: NEGATIVE MG/DL
CLARITY, POC: CLEAR
COLOR UR: YELLOW
GLUCOSE UR STRIP-MCNC: NEGATIVE MG/DL
KETONES UR QL: NEGATIVE
LEUKOCYTE EST, POC: NEGATIVE
NITRITE UR-MCNC: NEGATIVE MG/ML
PH UR: 5 [PH] (ref 5–8)
PROT UR STRIP-MCNC: NEGATIVE MG/DL
RBC # UR STRIP: NEGATIVE /UL
SP GR UR: 1.02 (ref 1–1.03)
UROBILINOGEN UR QL: NORMAL

## 2020-02-14 PROCEDURE — 76830 TRANSVAGINAL US NON-OB: CPT | Performed by: NURSE PRACTITIONER

## 2020-02-14 PROCEDURE — 99213 OFFICE O/P EST LOW 20 MIN: CPT | Performed by: NURSE PRACTITIONER

## 2020-02-14 PROCEDURE — 81002 URINALYSIS NONAUTO W/O SCOPE: CPT | Performed by: NURSE PRACTITIONER

## 2020-02-14 NOTE — PROGRESS NOTES
"Subjective     Chief Complaint   Patient presents with   • Pelvic Pain       Rosalinda Boone is a 59 y.o.  whose LMP is No LMP recorded. Patient is postmenopausal.  She has an established patient. She presents with complaints of (R) lower quad pain and spotting. She reports that pain is always in the same place. She reports the pain is enough that she needs to put pressure on it but does not double her over in pain. Sometime she hurts for several days consecutively but other times she doesn't hurt at all. She is also having spotting episodes. Reports spotting very infrequently over the last 3-4 months. She reports only a few drops on her peripad but enough that she does need to wear a pad. Reports the bleeding is brownish and pink but has never been bright red. She has seen a GI and was ref to gyn. This problem is new to me, the examiner.     She is taking HRT. She states it took 3 years to figure out her medication therapy to \"make me feel normal.\" She states she does not want to change her medication. States she would rather have a hysterectomy than stop her hormones.       HPI    HPI    The following portions of the patient's history were reviewed and updated as appropriate:vital signs, allergies, current medications, past medical history, past social history, past surgical history and problem list      Review of Systems     Review of Systems   Constitutional: Negative.    Gastrointestinal: Negative.    Genitourinary: Positive for pelvic pain and vaginal bleeding. Negative for vaginal discharge.   Musculoskeletal: Negative.    Skin: Negative.    Psychiatric/Behavioral: Negative.        Objective      /78   Ht 160 cm (63\")   Wt 78 kg (172 lb)   BMI 30.47 kg/m²     Physical Exam    Physical Exam   Constitutional: She is oriented to person, place, and time. She appears well-developed and well-nourished.   Pulmonary/Chest: Effort normal. No respiratory distress.   Musculoskeletal: Normal range of " motion. She exhibits no edema.   Neurological: She is alert and oriented to person, place, and time.   Skin: Skin is warm and dry.   Psychiatric: She has a normal mood and affect. Her behavior is normal.   Vitals reviewed.      Lab Review   Labs: Urinalysis - with micro     Imaging   Ultrasound - Pelvic Vaginal. US IMP: UTERUS IS RETROVERTED WITH SMOOTH BORDERS AND CONTOURS. ENDOMETRIAL LINING IS VISUALIZED WITHIN THE CENTER OF THE UTERUS MEASURING .89 CM. THICK. ECHO PATTERN OF THE UTERUS IS INHOMOGENEOUS. ECHO PATTERN OF BOTH OVARIES IS HOMOGENEOUS. NO FREE FLUID NOR MASSES ARE VISUALIZED IN THE ADNEXAE NOR CUL DE SAC. BOWEL ACTIVITY IS VISUALIZED.    Assessment  Rosalinda was seen today for pelvic pain.    Diagnoses and all orders for this visit:    Screening for condition  -     POC Urinalysis Dipstick        Additional Assessment:   1. Right lower quadrant pain  2. postmenopausal bleeding    Plan     1. Right lower quadrant pain-uncertain cause at this time.  Reviewed pelvic ultrasound with patient.  Reassured ovaries appeared normal.  Reviewed warning signs and symptoms.  2. Postmenopausal bleeding-endometrial lining noted to be 0.89 cm on ultrasound today.  Discussed with patient this is concerning and an expected lining would be less than 0.4 cm in a postmenopausal patient.  Discussed that anytime there is postmenopausal bleeding needs to be evaluated.  Disc that her hormone replacement therapy could be contributing to the postmenopausal bleeding.  Patient reiterates that she does not want to stop her hormones and would prefer a hysterectomy over discontinuation use of medication.  Discussed endometrial biopsy versus D&C.  The patient states she does not want any biopsy done in the office and prefers to proceed with hysteroscopy D&C.  Dr. Paul was called and she agrees to proceed with hysteroscopy D&C after reviewing ultrasound findings, patient's complaints and patient's preference.  Risks, benefits, and  alternatives were discussed.  Discussed with patients that her hormones will be reevaluated following the pathology from her D&C.      No follow-ups on file.      Nicolle Lubin, APRN  2/14/2020

## 2020-02-18 ENCOUNTER — TELEPHONE (OUTPATIENT)
Dept: OBSTETRICS AND GYNECOLOGY | Facility: CLINIC | Age: 60
End: 2020-02-18

## 2020-02-23 ENCOUNTER — PREP FOR SURGERY (OUTPATIENT)
Dept: OTHER | Facility: HOSPITAL | Age: 60
End: 2020-02-23

## 2020-02-23 DIAGNOSIS — N95.0 PMB (POSTMENOPAUSAL BLEEDING): Primary | ICD-10-CM

## 2020-02-23 RX ORDER — SODIUM CHLORIDE 0.9 % (FLUSH) 0.9 %
3 SYRINGE (ML) INJECTION EVERY 12 HOURS SCHEDULED
Status: CANCELLED | OUTPATIENT
Start: 2020-02-23

## 2020-02-23 RX ORDER — SODIUM CHLORIDE 0.9 % (FLUSH) 0.9 %
1-10 SYRINGE (ML) INJECTION AS NEEDED
Status: CANCELLED | OUTPATIENT
Start: 2020-02-23

## 2020-02-23 RX ORDER — SODIUM CHLORIDE 9 MG/ML
40 INJECTION, SOLUTION INTRAVENOUS AS NEEDED
Status: CANCELLED | OUTPATIENT
Start: 2020-02-23

## 2020-02-24 PROBLEM — N95.0 PMB (POSTMENOPAUSAL BLEEDING): Status: ACTIVE | Noted: 2020-02-24

## 2020-02-26 ENCOUNTER — APPOINTMENT (OUTPATIENT)
Dept: PREADMISSION TESTING | Facility: HOSPITAL | Age: 60
End: 2020-02-26

## 2020-02-26 VITALS
BODY MASS INDEX: 29.59 KG/M2 | OXYGEN SATURATION: 95 % | WEIGHT: 173.3 LBS | SYSTOLIC BLOOD PRESSURE: 132 MMHG | HEIGHT: 64 IN | HEART RATE: 95 BPM | RESPIRATION RATE: 16 BRPM | DIASTOLIC BLOOD PRESSURE: 72 MMHG

## 2020-02-26 LAB
ANION GAP SERPL CALCULATED.3IONS-SCNC: 10.3 MMOL/L (ref 5–15)
BUN BLD-MCNC: 13 MG/DL (ref 6–20)
BUN/CREAT SERPL: 20.6 (ref 7–25)
CALCIUM SPEC-SCNC: 8.9 MG/DL (ref 8.6–10.5)
CHLORIDE SERPL-SCNC: 101 MMOL/L (ref 98–107)
CO2 SERPL-SCNC: 25.7 MMOL/L (ref 22–29)
CREAT BLD-MCNC: 0.63 MG/DL (ref 0.57–1)
DEPRECATED RDW RBC AUTO: 44.3 FL (ref 37–54)
ERYTHROCYTE [DISTWIDTH] IN BLOOD BY AUTOMATED COUNT: 12.4 % (ref 12.3–15.4)
GFR SERPL CREATININE-BSD FRML MDRD: 97 ML/MIN/1.73
GLUCOSE BLD-MCNC: 105 MG/DL (ref 65–99)
HCT VFR BLD AUTO: 43.7 % (ref 34–46.6)
HGB BLD-MCNC: 14.3 G/DL (ref 12–15.9)
MCH RBC QN AUTO: 31.5 PG (ref 26.6–33)
MCHC RBC AUTO-ENTMCNC: 32.7 G/DL (ref 31.5–35.7)
MCV RBC AUTO: 96.3 FL (ref 79–97)
PLATELET # BLD AUTO: 344 10*3/MM3 (ref 140–450)
PMV BLD AUTO: 9.1 FL (ref 6–12)
POTASSIUM BLD-SCNC: 3.7 MMOL/L (ref 3.5–5.2)
RBC # BLD AUTO: 4.54 10*6/MM3 (ref 3.77–5.28)
SODIUM BLD-SCNC: 137 MMOL/L (ref 136–145)
WBC NRBC COR # BLD: 11.74 10*3/MM3 (ref 3.4–10.8)

## 2020-02-26 PROCEDURE — 80048 BASIC METABOLIC PNL TOTAL CA: CPT | Performed by: OBSTETRICS & GYNECOLOGY

## 2020-02-26 PROCEDURE — 85027 COMPLETE CBC AUTOMATED: CPT | Performed by: OBSTETRICS & GYNECOLOGY

## 2020-02-26 PROCEDURE — 93010 ELECTROCARDIOGRAM REPORT: CPT | Performed by: INTERNAL MEDICINE

## 2020-02-26 PROCEDURE — 36415 COLL VENOUS BLD VENIPUNCTURE: CPT

## 2020-02-26 PROCEDURE — 93005 ELECTROCARDIOGRAM TRACING: CPT

## 2020-02-26 RX ORDER — LACTOBACILLUS RHAMNOSUS GG 10B CELL
1 CAPSULE ORAL DAILY
COMMUNITY
End: 2020-08-12

## 2020-02-26 RX ORDER — METHENAMINE, SODIUM PHOSPHATE, MONOBASIC, MONOHYDRATE, PHENYL SALICYLATE, METHYLENE BLUE, AND HYOSCYAMINE SULFATE 120; 40.8; 36; 10; .12 MG/1; MG/1; MG/1; MG/1; MG/1
1 CAPSULE ORAL 3 TIMES DAILY PRN
COMMUNITY

## 2020-03-02 ENCOUNTER — ANESTHESIA EVENT (OUTPATIENT)
Dept: PERIOP | Facility: HOSPITAL | Age: 60
End: 2020-03-02

## 2020-03-02 PROCEDURE — S0260 H&P FOR SURGERY: HCPCS | Performed by: OBSTETRICS & GYNECOLOGY

## 2020-03-02 NOTE — H&P
Patient Care Team:  Shraddha Sosa MD as PCP - General (Internal Medicine)  Candelario Sears MD as Consulting Physician (Allergy)    Chief complaint  VB       HPI: 58 yo est pt with  VB. She is on HRT and is happy with it thus far. She has spotting off and on for the past 3-4 months. Her US shows a RV uterus with an EL= 0.89 cm. She declined in office biopsy and is here for a D&C      PMH:   Past Medical History:   Diagnosis Date   • Arthritis     HANDS, BACK , FEET, NECK   • Asthma    • Bladder spasm    • Gastritis    • GERD (gastroesophageal reflux disease)    • IBS (irritable bowel syndrome)    • Migraine    • Palpitations    • Panic attacks    • Panic attacks    • PONV (postoperative nausea and vomiting)    • Premature atrial contractions    • Right sided abdominal pain    • Scoliosis    • SOB (shortness of breath)    • Spotting     SCHEDULED FOR D&C   • SVT (supraventricular tachycardia) (CMS/HCC)     14 day Zio Patch starting on 12/22/17: 33 brief runs of SVT (longest 20 beats in length at 153 bpm).    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   Tobacco Use   • Smoking status: Never Smoker   • Smokeless tobacco: Never Used   Substance and Sexual Activity   • Alcohol use: Yes     Alcohol/week: 1.0 - 2.0 standard drinks     Types: 1 - 2 Glasses of wine per week     Comment: daily   • Drug use: No   • Sexual activity: Defer     Partners:  Male     Birth control/protection: Post-menopausal   Social History Narrative    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 [employer]       FHx:   Family History   Problem Relation Age of Onset   • Cancer Father 63        lung-smoker   • Aneurysm Mother 73        AAA   • Scleroderma Maternal Aunt    • Leukemia Maternal Grandmother    • Aneurysm Maternal Grandfather         Cerebral   • Breast cancer Neg Hx    • Ovarian cancer Neg Hx    • Colon cancer Neg Hx    • Deep vein thrombosis Neg Hx    • Pulmonary embolism Neg Hx      OB History       Para Term  AB Living     2 2 2     2     SAB TAB Ectopic Molar Multiple Live Births                           Obstetric Comments     2              Menarche:13  Menopause:52  HRT:yes x 2 years, none now  Current contraception: post menopausal status  History of abnormal Pap smear: no  History of abnormal mammogram: no  Family history of uterine, colon or ovarian cancer: no  Family history of breast cancer: no  STD's: non  Last pap smear: 3/2019- nl pap/HPV        Allergies: Codeine; Levaquin [levofloxacin]; Qvar [beclomethasone]; Sulfa antibiotics; Erythromycin; and Keflex [cephalexin]    Medications:   No current facility-administered medications on file prior to encounter.      Current Outpatient Medications on File Prior to Encounter   Medication Sig Dispense Refill   • ASMANEX 60 METERED DOSES 220 MCG/INH inhaler Inhale 1 puff Daily.     • azelastine (ASTEPRO) 0.15 % solution nasal spray 1 spray into the nostril(s) as directed by provider Daily As Needed.     • CALCIUM PO Take 1 tablet by mouth Daily.     • desloratadine (CLARINEX) 5 MG tablet Take 5 mg by mouth Every Morning.     • diltiaZEM (CARDIZEM) 30 MG tablet TAKE 1 TABLET TWICE A DAY (Patient taking differently: Take 30 mg by mouth 2 (Two) Times a Day.) 180 tablet 3   • estradiol (ESTRACE) 0.1 MG/GM vaginal cream Insert 1 g into the vagina 2 (Two) Times a Week. 45 g 6   • estradiol-norethindrone  (COMBIPATCH) 0.05-0.14 MG/DAY patch Place 1 patch on the skin as directed by provider 2 (Two) Times a Week. 24 each 3   • FLUTICASONE PROPIONATE, NASAL, NA 1 spray into the nostril(s) as directed by provider Daily As Needed.     • Glucosamine-Chondroit-Vit C-Mn (GLUCOSAMINE CHONDR 1500 COMPLX PO) Take 2 tablets by mouth daily.     • lansoprazole (PREVACID) 30 MG capsule Take 1 capsule by mouth 2 (Two) Times a Day. 180 capsule 3   • montelukast (SINGULAIR) 10 MG tablet Take 10 mg by mouth Every Night.     • Multiple Vitamin (MULTI VITAMIN DAILY PO) Take 1 tablet by mouth daily.     • NON FORMULARY f d guard.     • PROAIR  (90 BASE) MCG/ACT inhaler Inhale 2 puffs Every 4 (Four) Hours As Needed.     • Turmeric 500 MG tablet Take 500 mg by mouth Daily.     • Unable to find 1 each 1 (One) Time As Needed. IB GUARD      • Wheat Dextrin (BENEFIBER) powder Take 1 application by mouth Daily.     • XIIDRA 5 % solution Administer 1 drop to both eyes 2 (Two) Times a Day.         There were no vitals taken for this visit.    Review of Systems   Gastrointestinal: Positive for abdominal pain.   Genitourinary: Positive for pelvic pain and vaginal bleeding.   All other systems reviewed and are negative.      Physical Exam   Constitutional: She is oriented to person, place, and time. She appears well-developed and well-nourished.   HENT:   Head: Normocephalic and atraumatic.   Eyes: Conjunctivae are normal. No scleral icterus.   Neck: Neck supple. No thyromegaly present.   Cardiovascular: Normal rate and regular rhythm.   Pulmonary/Chest: Effort normal and breath sounds normal.   Abdominal: Soft. Bowel sounds are normal. She exhibits no distension and no mass. There is no tenderness. There is no rebound and no guarding. No hernia.   Neurological: She is alert and oriented to person, place, and time.   Skin: Skin is warm and dry.   Psychiatric: She has a normal mood and affect. Her behavior is normal. Judgment and thought  "content normal.   Nursing note and vitals reviewed.      Debilities/Disabilities Identified: None    Labs:     Lab Results (last 7 days)     ** No results found for the last 168 hours. **          Assessment/Plan:   1.  VB- thickened EL, declines in office biopsy. Plan HS D&C and possible MYOSURE. Risks, benefits and alternatives of the procedure were discussed, including , but not limited to: infection, bleeding, transfusion, injury to adjacent structures, laparotomy, possible non diagnostic findings, possible findings of unexpected malignancy, reoperation, recurrent symptoms, thromboembolic events, aneasthesic complications and death. Pre/intra/postop course was reviewed and all questions answered. Patient was encouraged to call for any additional questions she might have in the future.   2. HRT- pt is aware that her HRT may be contributing to her abnormal VB and she said \" I would rather had a hysterectomy than stop my hormones\". We discussed that this is likely we will need to adjust her HRT postop and she was agreeable to that if indicated.         I discussed the patients findings and my recommendations with patient.     Giana Paul MD  03/02/20  6:04 PM  "

## 2020-03-03 ENCOUNTER — HOSPITAL ENCOUNTER (OUTPATIENT)
Facility: HOSPITAL | Age: 60
Setting detail: HOSPITAL OUTPATIENT SURGERY
Discharge: HOME | End: 2020-03-03
Attending: OBSTETRICS & GYNECOLOGY | Admitting: OBSTETRICS & GYNECOLOGY

## 2020-03-03 ENCOUNTER — ANESTHESIA (OUTPATIENT)
Dept: PERIOP | Facility: HOSPITAL | Age: 60
End: 2020-03-03

## 2020-03-03 VITALS
SYSTOLIC BLOOD PRESSURE: 104 MMHG | HEART RATE: 70 BPM | WEIGHT: 168.8 LBS | RESPIRATION RATE: 20 BRPM | TEMPERATURE: 98 F | BODY MASS INDEX: 29.43 KG/M2 | OXYGEN SATURATION: 93 % | DIASTOLIC BLOOD PRESSURE: 56 MMHG

## 2020-03-03 DIAGNOSIS — N95.0 PMB (POSTMENOPAUSAL BLEEDING): ICD-10-CM

## 2020-03-03 DIAGNOSIS — Z98.890 S/P D&C (STATUS POST DILATION AND CURETTAGE): Primary | ICD-10-CM

## 2020-03-03 PROCEDURE — 25010000002 ONDANSETRON PER 1 MG: Performed by: NURSE ANESTHETIST, CERTIFIED REGISTERED

## 2020-03-03 PROCEDURE — 25010000002 DEXAMETHASONE PER 1 MG: Performed by: NURSE ANESTHETIST, CERTIFIED REGISTERED

## 2020-03-03 PROCEDURE — 25010000002 MIDAZOLAM PER 1MG: Performed by: NURSE ANESTHETIST, CERTIFIED REGISTERED

## 2020-03-03 PROCEDURE — 88305 TISSUE EXAM BY PATHOLOGIST: CPT | Performed by: OBSTETRICS & GYNECOLOGY

## 2020-03-03 PROCEDURE — 25010000002 PROPOFOL 10 MG/ML EMULSION: Performed by: NURSE ANESTHETIST, CERTIFIED REGISTERED

## 2020-03-03 PROCEDURE — 58558 HYSTEROSCOPY BIOPSY: CPT | Performed by: OBSTETRICS & GYNECOLOGY

## 2020-03-03 PROCEDURE — 25010000002 KETOROLAC TROMETHAMINE PER 15 MG: Performed by: NURSE ANESTHETIST, CERTIFIED REGISTERED

## 2020-03-03 RX ORDER — SODIUM CHLORIDE, SODIUM LACTATE, POTASSIUM CHLORIDE, CALCIUM CHLORIDE 600; 310; 30; 20 MG/100ML; MG/100ML; MG/100ML; MG/100ML
100 INJECTION, SOLUTION INTRAVENOUS CONTINUOUS
Status: DISCONTINUED | OUTPATIENT
Start: 2020-03-03 | End: 2020-03-03 | Stop reason: HOSPADM

## 2020-03-03 RX ORDER — KETOROLAC TROMETHAMINE 30 MG/ML
INJECTION, SOLUTION INTRAMUSCULAR; INTRAVENOUS AS NEEDED
Status: DISCONTINUED | OUTPATIENT
Start: 2020-03-03 | End: 2020-03-03 | Stop reason: SURG

## 2020-03-03 RX ORDER — MIDAZOLAM HYDROCHLORIDE 2 MG/2ML
1 INJECTION, SOLUTION INTRAMUSCULAR; INTRAVENOUS
Status: DISCONTINUED | OUTPATIENT
Start: 2020-03-03 | End: 2020-03-03 | Stop reason: HOSPADM

## 2020-03-03 RX ORDER — MAGNESIUM HYDROXIDE 1200 MG/15ML
LIQUID ORAL AS NEEDED
Status: DISCONTINUED | OUTPATIENT
Start: 2020-03-03 | End: 2020-03-03 | Stop reason: HOSPADM

## 2020-03-03 RX ORDER — SODIUM CHLORIDE 0.9 % (FLUSH) 0.9 %
3 SYRINGE (ML) INJECTION EVERY 12 HOURS SCHEDULED
Status: DISCONTINUED | OUTPATIENT
Start: 2020-03-03 | End: 2020-03-03 | Stop reason: HOSPADM

## 2020-03-03 RX ORDER — SODIUM CHLORIDE, SODIUM LACTATE, POTASSIUM CHLORIDE, CALCIUM CHLORIDE 600; 310; 30; 20 MG/100ML; MG/100ML; MG/100ML; MG/100ML
9 INJECTION, SOLUTION INTRAVENOUS CONTINUOUS
Status: DISCONTINUED | OUTPATIENT
Start: 2020-03-03 | End: 2020-03-03 | Stop reason: HOSPADM

## 2020-03-03 RX ORDER — LIDOCAINE HYDROCHLORIDE 10 MG/ML
0.5 INJECTION, SOLUTION EPIDURAL; INFILTRATION; INTRACAUDAL; PERINEURAL ONCE AS NEEDED
Status: DISCONTINUED | OUTPATIENT
Start: 2020-03-03 | End: 2020-03-03 | Stop reason: HOSPADM

## 2020-03-03 RX ORDER — PROPOFOL 10 MG/ML
VIAL (ML) INTRAVENOUS AS NEEDED
Status: DISCONTINUED | OUTPATIENT
Start: 2020-03-03 | End: 2020-03-03 | Stop reason: SURG

## 2020-03-03 RX ORDER — ONDANSETRON 2 MG/ML
4 INJECTION INTRAMUSCULAR; INTRAVENOUS ONCE AS NEEDED
Status: COMPLETED | OUTPATIENT
Start: 2020-03-03 | End: 2020-03-03

## 2020-03-03 RX ORDER — IBUPROFEN 600 MG/1
600 TABLET ORAL EVERY 6 HOURS PRN
Qty: 30 TABLET | Refills: 0 | Status: SHIPPED | OUTPATIENT
Start: 2020-03-03 | End: 2020-06-17

## 2020-03-03 RX ORDER — HYDROCODONE BITARTRATE AND ACETAMINOPHEN 5; 325 MG/1; MG/1
1 TABLET ORAL EVERY 4 HOURS PRN
Qty: 10 TABLET | Refills: 0 | Status: SHIPPED | OUTPATIENT
Start: 2020-03-03 | End: 2020-06-17

## 2020-03-03 RX ORDER — LIDOCAINE HYDROCHLORIDE 20 MG/ML
INJECTION, SOLUTION INFILTRATION; PERINEURAL AS NEEDED
Status: DISCONTINUED | OUTPATIENT
Start: 2020-03-03 | End: 2020-03-03 | Stop reason: SURG

## 2020-03-03 RX ORDER — KETAMINE HYDROCHLORIDE 10 MG/ML
INJECTION INTRAMUSCULAR; INTRAVENOUS AS NEEDED
Status: DISCONTINUED | OUTPATIENT
Start: 2020-03-03 | End: 2020-03-03 | Stop reason: SURG

## 2020-03-03 RX ORDER — DEXAMETHASONE SODIUM PHOSPHATE 4 MG/ML
8 INJECTION, SOLUTION INTRA-ARTICULAR; INTRALESIONAL; INTRAMUSCULAR; INTRAVENOUS; SOFT TISSUE ONCE AS NEEDED
Status: COMPLETED | OUTPATIENT
Start: 2020-03-03 | End: 2020-03-03

## 2020-03-03 RX ORDER — SODIUM CHLORIDE 9 MG/ML
40 INJECTION, SOLUTION INTRAVENOUS AS NEEDED
Status: DISCONTINUED | OUTPATIENT
Start: 2020-03-03 | End: 2020-03-03 | Stop reason: HOSPADM

## 2020-03-03 RX ORDER — ONDANSETRON 2 MG/ML
4 INJECTION INTRAMUSCULAR; INTRAVENOUS ONCE AS NEEDED
Status: DISCONTINUED | OUTPATIENT
Start: 2020-03-03 | End: 2020-03-03 | Stop reason: HOSPADM

## 2020-03-03 RX ORDER — OXYCODONE HYDROCHLORIDE AND ACETAMINOPHEN 5; 325 MG/1; MG/1
1 TABLET ORAL ONCE AS NEEDED
Status: DISCONTINUED | OUTPATIENT
Start: 2020-03-03 | End: 2020-03-03 | Stop reason: HOSPADM

## 2020-03-03 RX ORDER — SODIUM CHLORIDE 0.9 % (FLUSH) 0.9 %
1-10 SYRINGE (ML) INJECTION AS NEEDED
Status: DISCONTINUED | OUTPATIENT
Start: 2020-03-03 | End: 2020-03-03 | Stop reason: HOSPADM

## 2020-03-03 RX ORDER — DEXMEDETOMIDINE HYDROCHLORIDE 100 UG/ML
INJECTION, SOLUTION INTRAVENOUS AS NEEDED
Status: DISCONTINUED | OUTPATIENT
Start: 2020-03-03 | End: 2020-03-03 | Stop reason: SURG

## 2020-03-03 RX ORDER — MIDAZOLAM HYDROCHLORIDE 2 MG/2ML
2 INJECTION, SOLUTION INTRAMUSCULAR; INTRAVENOUS
Status: DISCONTINUED | OUTPATIENT
Start: 2020-03-03 | End: 2020-03-03 | Stop reason: HOSPADM

## 2020-03-03 RX ADMIN — OXYCODONE HYDROCHLORIDE AND ACETAMINOPHEN 1 TABLET: 5; 325 TABLET ORAL at 11:48

## 2020-03-03 RX ADMIN — PROPOFOL 20 MG: 10 INJECTION, EMULSION INTRAVENOUS at 11:21

## 2020-03-03 RX ADMIN — KETAMINE HYDROCHLORIDE 10 MG: 10 INJECTION, SOLUTION INTRAMUSCULAR; INTRAVENOUS at 11:14

## 2020-03-03 RX ADMIN — DEXMEDETOMIDINE HYDROCHLORIDE 20 MCG: 100 INJECTION, SOLUTION, CONCENTRATE INTRAVENOUS at 11:12

## 2020-03-03 RX ADMIN — ONDANSETRON 4 MG: 2 INJECTION, SOLUTION INTRAMUSCULAR; INTRAVENOUS at 11:01

## 2020-03-03 RX ADMIN — LIDOCAINE HYDROCHLORIDE 100 MG: 20 INJECTION, SOLUTION INFILTRATION; PERINEURAL at 11:12

## 2020-03-03 RX ADMIN — KETOROLAC TROMETHAMINE 30 MG: 30 INJECTION INTRAMUSCULAR; INTRAVENOUS at 11:19

## 2020-03-03 RX ADMIN — MIDAZOLAM HYDROCHLORIDE 2 MG: 1 INJECTION, SOLUTION INTRAMUSCULAR; INTRAVENOUS at 11:01

## 2020-03-03 RX ADMIN — SODIUM CHLORIDE, POTASSIUM CHLORIDE, SODIUM LACTATE AND CALCIUM CHLORIDE 9 ML/HR: 600; 310; 30; 20 INJECTION, SOLUTION INTRAVENOUS at 11:06

## 2020-03-03 RX ADMIN — PROPOFOL 50 MG: 10 INJECTION, EMULSION INTRAVENOUS at 11:13

## 2020-03-03 RX ADMIN — DEXAMETHASONE SODIUM PHOSPHATE 8 MG: 4 INJECTION, SOLUTION INTRAMUSCULAR; INTRAVENOUS at 11:05

## 2020-03-03 RX ADMIN — PROPOFOL 50 MG: 10 INJECTION, EMULSION INTRAVENOUS at 11:18

## 2020-03-03 NOTE — ANESTHESIA POSTPROCEDURE EVALUATION
Patient: Rosalinda Boone    Procedure Summary     Date:  03/03/20 Room / Location:  [facility] OR 4 /  [facility]    Anesthesia Start:  1109 Anesthesia Stop:  1131    Procedure:  DILATATION AND CURETTAGE HYSTEROSCOPY, possible myosure (N/A Uterus) Diagnosis:       PMB (postmenopausal bleeding)      (PMB (postmenopausal bleeding) [N95.0])    Surgeon:  Giana Paul MD Provider:  Ken Lam CRNA    Anesthesia Type:  MAC, general ASA Status:  2          Anesthesia Type: MAC, general    Vitals  Vitals Value Taken Time   BP 99/69 3/3/2020 11:40 AM   Temp 98 °F (36.7 °C) 3/3/2020 11:29 AM   Pulse 73 3/3/2020 11:40 AM   Resp 10 3/3/2020 11:40 AM   SpO2 95 % 3/3/2020 11:40 AM           Post Anesthesia Care and Evaluation    Patient location during evaluation: PHASE II  Patient participation: complete - patient participated  Level of consciousness: awake and alert  Pain score: 0  Pain management: adequate  Airway patency: patent  Anesthetic complications: No anesthetic complications  PONV Status: none  Cardiovascular status: acceptable  Respiratory status: acceptable  Hydration status: acceptable

## 2020-03-03 NOTE — INTERVAL H&P NOTE
H&P reviewed. The patient was examined and there are no changes to the H&P.   /84   Pulse 75   Temp 98.8 °F (37.1 °C) (Oral)   Resp 15   Wt 76.6 kg (168 lb 12.8 oz)   SpO2 97%   BMI 29.43 kg/m²   Procedure reviewed and all questions answered.   Giana Paul MD

## 2020-03-03 NOTE — ANESTHESIA PREPROCEDURE EVALUATION
Anesthesia Evaluation     Patient summary reviewed and Nursing notes reviewed   history of anesthetic complications: PONV  NPO Solid Status: > 8 hours  NPO Liquid Status: > 2 hours           Airway   Mallampati: II  TM distance: >3 FB  Neck ROM: full  No difficulty expected  Dental - normal exam     Pulmonary - normal exam   (+) asthma,shortness of breath,     ROS comment: Sinus allergies  Cardiovascular - normal exam  Exercise tolerance: good (4-7 METS)    ECG reviewed  Rhythm: regular  Rate: normal    (+) dysrhythmias (SVT), SHAH, hyperlipidemia,     ROS comment: Sees Cardiologist annually    Neuro/Psych  (+) headaches, numbness (bilat hands), psychiatric history (panic attacks) Anxiety,     GI/Hepatic/Renal/Endo    (+)  GERD well controlled,      Musculoskeletal     (+) back pain (scoliosis),   Abdominal  - normal exam   Substance History   (+) alcohol use,      OB/GYN      Comment: PMB      Other   arthritis,                      Anesthesia Plan    ASA 2     MAC and general       Anesthetic plan, all risks, benefits, and alternatives have been provided, discussed and informed consent has been obtained with: patient.  Use of blood products discussed with patient  Consented to blood products.

## 2020-03-03 NOTE — OP NOTE
Date of Service:  3/3/2020    Time of Service:  11:26 AM     Surgical Staff: Surgeon(s) and Role:     * Giana Paul MD - Primary   Additional Staff: non   Pre-operative diagnosis(es): Pre-Op Diagnosis Codes:     * PMB (postmenopausal bleeding) [N95.0]     Post-operative diagnosis(es): Post-Op Diagnosis Codes:     * PMB (postmenopausal bleeding) [N95.0]   Procedure(s): Procedure(s):  DILATATION AND CURETTAGE HYSTEROSCOPY, possible myosure     Antibiotics: non ordered on call to OR     Anesthesia: Type: Choice  ASA:  II          Operative findings: Normal cavity, no polyps or masses   Specimens removed: A: endometrial currettings   Fluid Intake: 500mL   Output: Documented Output  Est. Blood Loss 5mL  Urine Output 100mL  Other Output < 50mL HSFD      I/O this shift:  In: 500 [I.V.:500]  Out: -      Blood products used: No   Drains: * No LDAs found *   Implant Information: Nothing was implanted during the procedure   Complications: None apparent   Intraoperative consult(s):    Condition: stable   Disposition: to PACU and then admit to  home             After all questions were answered the patient was taken to the OR and placed under IV sedation anesthesia. She was carefully and attentively placed in dorsal lithotomy position. She was prepped and draped in a sterile fashion. A speculum was placed into the vagina and a single tooth tenaculum was placed at the anterior lip of the cervix. The cervical os was dilated. A hysteroscope as then inserted and the endometrial cavity was distended with normal saline. A survey of the endometrial cavity revealed no polyps or fibroids. A sharp curette was then placed into the endometrial cavity and a sampling of the cavity was made. The procedure was deemed completed and all instruments were removed from the vagina. Excellent hemostasis was noted and pt was in stable condition to PACU.Alol counts were correct for the procedure.             Giana Paul,  MD  03/03/20  11:25 AM

## 2020-03-09 LAB
LAB AP CASE REPORT: NORMAL
PATH REPORT.FINAL DX SPEC: NORMAL
PATH REPORT.GROSS SPEC: NORMAL

## 2020-03-13 ENCOUNTER — APPOINTMENT (OUTPATIENT)
Dept: WOMENS IMAGING | Facility: HOSPITAL | Age: 60
End: 2020-03-13

## 2020-03-13 PROCEDURE — 77063 BREAST TOMOSYNTHESIS BI: CPT | Performed by: RADIOLOGY

## 2020-03-13 PROCEDURE — 77067 SCR MAMMO BI INCL CAD: CPT | Performed by: RADIOLOGY

## 2020-03-26 ENCOUNTER — TELEPHONE (OUTPATIENT)
Dept: OBSTETRICS AND GYNECOLOGY | Facility: CLINIC | Age: 60
End: 2020-03-26

## 2020-03-26 RX ORDER — ESTRADIOL 0.1 MG/G
1 CREAM VAGINAL 2 TIMES WEEKLY
Qty: 45 G | Refills: 6 | Status: SHIPPED | OUTPATIENT
Start: 2020-03-26 | End: 2020-10-27 | Stop reason: SDUPTHER

## 2020-03-26 NOTE — TELEPHONE ENCOUNTER
----- Message from Alice Shipman MA sent at 3/19/2020 12:41 PM EDT -----  Contact: [phone]  Called pt to cancel appointment due to coronavirus. Pt stated she is doing get and feeling fine after surgery. Pt stated she was suppose to discuss changing her hormone medicines. Pt stated if you could send which ever medicines you would like for her to be on though [facility] she will be fine with that or if you would like to give her a call she is open to either one.  LATISHA

## 2020-03-26 NOTE — TELEPHONE ENCOUNTER
I called and spoke with pt and told her we would not change anything with her HRT at present. Refills of Combipatch and estrace cream called to [facility].   Giana Paul MD

## 2020-03-26 NOTE — TELEPHONE ENCOUNTER
Can you let the patient know that since her D&C was totally normal and she is doing so well on her HRT, I am not going to change her dose at present.  Giana Paul MD

## 2020-04-01 ENCOUNTER — TELEPHONE (OUTPATIENT)
Dept: OBSTETRICS AND GYNECOLOGY | Facility: CLINIC | Age: 60
End: 2020-04-01

## 2020-04-01 RX ORDER — ESTRADIOL 0.05 MG/D
1 FILM, EXTENDED RELEASE TRANSDERMAL 2 TIMES WEEKLY
Qty: 8 PATCH | Refills: 2 | Status: SHIPPED | OUTPATIENT
Start: 2020-04-02 | End: 2020-04-15 | Stop reason: SDUPTHER

## 2020-04-01 NOTE — TELEPHONE ENCOUNTER
Call and spoke with pt. Will stop Combipatch and change to Vivelle dot 0.05  And Prometrium 200 mg QHS. AKR

## 2020-04-10 ENCOUNTER — TELEMEDICINE (OUTPATIENT)
Dept: GASTROENTEROLOGY | Facility: CLINIC | Age: 60
End: 2020-04-10

## 2020-04-10 DIAGNOSIS — K21.9 GASTROESOPHAGEAL REFLUX DISEASE, ESOPHAGITIS PRESENCE NOT SPECIFIED: Primary | ICD-10-CM

## 2020-04-10 DIAGNOSIS — R14.0 ABDOMINAL BLOATING: ICD-10-CM

## 2020-04-10 DIAGNOSIS — K58.0 IRRITABLE BOWEL SYNDROME WITH DIARRHEA: ICD-10-CM

## 2020-04-10 PROCEDURE — 99213 OFFICE O/P EST LOW 20 MIN: CPT | Performed by: NURSE PRACTITIONER

## 2020-04-10 NOTE — PROGRESS NOTES
No chief complaint on file.      Rosalinda Boone is a  60 y.o. female here for a follow up visit for GERD.    HPI  60-year-old female presents today for follow-up visit for GERD and IBS-D.  She is a patient of Dr. Real.  She was last seen in the office on 1/10/2020.  She is a history of GERD admits she does really well on Prevacid 30 mg.  She denies any breakthrough reflux at this time.  She also has a history of IBS-D and admits that she is done really well since her gynecologist changed her hormone medication regimen.  She was having a lot of vaginal bleeding so her meds were changed.  She said since changing the meds in March she has felt so much better.  She tells me she is sleeping better through the night and even her IBS symptoms have resolved.  She tells me her abdominal bloating is even gone.  She is still trying to stay away from all dairy.  She is using a Lactaid milk.  She denies any dysphagia, reflux, abdominal pain, nausea and vomiting, diarrhea, constipation, rectal bleeding or melena.  She admits her appetite is good and her weight is stable.  Last EGD and colonoscopy was done on 12/5/2016.  Past Medical History:   Diagnosis Date   • Arthritis     HANDS, BACK , FEET, NECK   • Asthma    • Bladder spasm    • Gastritis    • GERD (gastroesophageal reflux disease)    • IBS (irritable bowel syndrome)    • Migraine    • Palpitations    • Panic attacks    • Panic attacks    • PONV (postoperative nausea and vomiting)    • Premature atrial contractions    • Right sided abdominal pain    • S/P D&C (status post dilation and curettage) 3/3/2020   • Scoliosis    • SOB (shortness of breath)    • Spotting     SCHEDULED FOR D&C   • SVT (supraventricular tachycardia) (CMS/[facility])     14 day Zio Patch starting on 12/22/17: 33 brief runs of SVT (longest 20 beats in length at 153 bpm).    • Tingling     in hands    • Urinary tract infection        Past Surgical History:   Procedure Laterality Date   • COLONOSCOPY N/A  12/5/2016    NB,    • D&C HYSTEROSCOPY N/A 3/3/2020    Procedure: DILATATION AND CURETTAGE HYSTEROSCOPY, possible myosure;  Surgeon: Giana Paul MD;  Location: [facility];  Service: Obstetrics/Gynecology;  Laterality: N/A;  DILATION AND CURETTAGE HYSTEROSCOPY   • D&C WITH SUCTION      for DUB   • ENDOSCOPY N/A 12/5/2016    z line regular, 40 cm from incisors,  granular gastric mucosa, chronic gastritis   • LASIK         Scheduled Meds:    Continuous Infusions:  No current facility-administered medications for this visit.     PRN Meds:.    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   Tobacco Use   • Smoking status: Never Smoker   • Smokeless tobacco: Never Used   Substance and Sexual Activity   • Alcohol use: Yes     Alcohol/week: 1.0 - 2.0 standard drinks     Types: 1 - 2 Glasses of wine per week     Comment: daily   • Drug use: No   • Sexual activity: Defer     Partners: Male     Birth control/protection: Post-menopausal   Social History Narrative    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   HENT: Negative for nosebleeds, postnasal drip, sore throat, trouble swallowing and voice change.    Respiratory: Negative for cough, choking, chest tightness, shortness of breath and wheezing.    Cardiovascular: Negative for chest pain, palpitations and leg swelling.   Gastrointestinal: Negative for abdominal distention, abdominal pain, anal bleeding, blood in stool, constipation, diarrhea, nausea, rectal pain and vomiting.   Endocrine: Negative for polydipsia, polyphagia and polyuria.   Musculoskeletal: Negative for gait problem.   Skin: Negative for rash and wound.   Allergic/Immunologic: Negative for food allergies.   Neurological: Negative for dizziness, speech difficulty and light-headedness.   Psychiatric/Behavioral: Negative for confusion, self-injury, sleep disturbance and suicidal ideas.       There were no vitals filed for this visit.    Physical Exam   Constitutional: She is oriented to person, place, and time. No distress.   Neurological: She is alert and oriented to person, place, and time.   Psychiatric: She has a normal mood and affect. Her behavior is normal. Judgment and thought content normal.       No radiology results for the last 7 days     Assessment and plan     1. Gastroesophageal reflux disease, esophagitis presence not specified    2. Irritable bowel syndrome with diarrhea    3. Abdominal bloating      Today's visit was supposed to be a telehealth MyChart visit with the patient but she was unable to get her MyChart to cooperate at home.  So today's visit was strictly over the phone.  Total time over the phone with the patient was 15 minutes.  She seems to be overall feeling much better since changing her hormones.  She tells me the bloating is completely resolved.  Bowels are moving much better.  GERD seems well controlled on Prevacid.  Continue GERD  precautions.  Patient to call the office with any issues.  Patient to follow-up with us in the office in 3 months.

## 2020-04-20 RX ORDER — ESTRADIOL 0.05 MG/D
1 FILM, EXTENDED RELEASE TRANSDERMAL 2 TIMES WEEKLY
Qty: 8 PATCH | Refills: 2 | Status: SHIPPED | OUTPATIENT
Start: 2020-04-20 | End: 2020-04-23 | Stop reason: SDUPTHER

## 2020-04-23 RX ORDER — ESTRADIOL 0.05 MG/D
1 FILM, EXTENDED RELEASE TRANSDERMAL 2 TIMES WEEKLY
Qty: 8 PATCH | Refills: 2 | Status: SHIPPED | OUTPATIENT
Start: 2020-04-23 | End: 2020-05-11 | Stop reason: SDUPTHER

## 2020-05-11 RX ORDER — ESTRADIOL 0.05 MG/D
1 FILM, EXTENDED RELEASE TRANSDERMAL 2 TIMES WEEKLY
Qty: 8 PATCH | Refills: 2 | Status: SHIPPED | OUTPATIENT
Start: 2020-05-11 | End: 2020-06-17 | Stop reason: DRUGHIGH

## 2020-06-17 ENCOUNTER — OFFICE VISIT (OUTPATIENT)
Dept: OBSTETRICS AND GYNECOLOGY | Facility: CLINIC | Age: 60
End: 2020-06-17

## 2020-06-17 ENCOUNTER — PROCEDURE VISIT (OUTPATIENT)
Dept: OBSTETRICS AND GYNECOLOGY | Facility: CLINIC | Age: 60
End: 2020-06-17

## 2020-06-17 VITALS
DIASTOLIC BLOOD PRESSURE: 82 MMHG | BODY MASS INDEX: 29.53 KG/M2 | HEIGHT: 64 IN | WEIGHT: 173 LBS | SYSTOLIC BLOOD PRESSURE: 122 MMHG

## 2020-06-17 DIAGNOSIS — R93.89 THICKENED ENDOMETRIUM: ICD-10-CM

## 2020-06-17 DIAGNOSIS — N95.0 PMB (POSTMENOPAUSAL BLEEDING): ICD-10-CM

## 2020-06-17 DIAGNOSIS — Z01.411 ENCOUNTER FOR GYNECOLOGICAL EXAMINATION WITH ABNORMAL FINDING: ICD-10-CM

## 2020-06-17 DIAGNOSIS — Z13.9 SCREENING FOR UNSPECIFIED CONDITION: ICD-10-CM

## 2020-06-17 DIAGNOSIS — Z79.890 HORMONE REPLACEMENT THERAPY (HRT): ICD-10-CM

## 2020-06-17 DIAGNOSIS — N95.0 POSTMENOPAUSAL BLEEDING: Primary | ICD-10-CM

## 2020-06-17 DIAGNOSIS — R10.2 PELVIC PAIN IN FEMALE: ICD-10-CM

## 2020-06-17 LAB
B-HCG UR QL: NEGATIVE
BILIRUB BLD-MCNC: NEGATIVE MG/DL
CLARITY, POC: CLEAR
COLOR UR: YELLOW
GLUCOSE UR STRIP-MCNC: NEGATIVE MG/DL
INTERNAL NEGATIVE CONTROL: NEGATIVE
INTERNAL POSITIVE CONTROL: POSITIVE
KETONES UR QL: NEGATIVE
LEUKOCYTE EST, POC: NEGATIVE
Lab: NORMAL
NITRITE UR-MCNC: NEGATIVE MG/ML
PH UR: 5 [PH] (ref 5–8)
PROT UR STRIP-MCNC: NEGATIVE MG/DL
RBC # UR STRIP: NEGATIVE /UL
SP GR UR: 1.02 (ref 1–1.03)
UROBILINOGEN UR QL: NORMAL

## 2020-06-17 PROCEDURE — 81025 URINE PREGNANCY TEST: CPT | Performed by: OBSTETRICS & GYNECOLOGY

## 2020-06-17 PROCEDURE — 81002 URINALYSIS NONAUTO W/O SCOPE: CPT | Performed by: OBSTETRICS & GYNECOLOGY

## 2020-06-17 PROCEDURE — 99396 PREV VISIT EST AGE 40-64: CPT | Performed by: OBSTETRICS & GYNECOLOGY

## 2020-06-17 PROCEDURE — 58100 BIOPSY OF UTERUS LINING: CPT | Performed by: OBSTETRICS & GYNECOLOGY

## 2020-06-17 PROCEDURE — 99213 OFFICE O/P EST LOW 20 MIN: CPT | Performed by: OBSTETRICS & GYNECOLOGY

## 2020-06-17 PROCEDURE — 76830 TRANSVAGINAL US NON-OB: CPT | Performed by: OBSTETRICS & GYNECOLOGY

## 2020-06-17 RX ORDER — CLINDAMYCIN AND BENZOYL PEROXIDE 10; 50 MG/G; MG/G
1 GEL TOPICAL 2 TIMES DAILY PRN
COMMUNITY
Start: 2020-06-08 | End: 2021-02-12

## 2020-06-17 RX ORDER — ESTRADIOL 0.04 MG/D
1 FILM, EXTENDED RELEASE TRANSDERMAL 2 TIMES WEEKLY
Qty: 8 PATCH | Refills: 3 | Status: SHIPPED | OUTPATIENT
Start: 2020-06-18 | End: 2020-06-19 | Stop reason: SDUPTHER

## 2020-06-17 RX ORDER — FLUTICASONE PROPIONATE 50 MCG
SPRAY, SUSPENSION (ML) NASAL
COMMUNITY
Start: 2020-04-23 | End: 2020-07-10

## 2020-06-17 NOTE — PROGRESS NOTES
GYN Annual Exam     CC- Here for annual exam.     Rosalinda Boone is a 60 y.o. female est pt who presents for annual well woman exam and TVUS for  VB.  She was placed on CombiPatch last year and had some postmenopausal bleeding.  She had an ultrasound that showed an endometrial lining of 0.89 cm and in 2020 underwent a hysteroscopy D&C.  The pathology was benign.  She had a normal, atrophic appearing cavity.  We changed her to Vivelle-Dot 0.05 mg and Prometrium 200 mg.  Her ultrasound today shows a 5.6 cm uterus with an endometrial thickness of 1.23 cm and normal ovaries.she says she does not have HF anymore and her HRT is working well, except that she has felt some dizziness on the Vivelle/Prometrium combo. Her US was compared to scan on 2020. We discussed endo bx vs D&C and she wants to try an endo bx first and we will also decrease the amount of her HRT to see if we can improve the dizziness.     OB History        2    Para   2    Term   2            AB        Living   2       SAB        TAB        Ectopic        Molar        Multiple        Live Births              Obstetric Comments   2              Menarche:13  Menopause:52  HRT:yes x 3 years  Current contraception: post menopausal status  History of abnormal Pap smear: no  History of abnormal mammogram: no  Family history of uterine, colon or ovarian cancer: no  Family history of breast cancer: no  STD's: none  Last pap smear:3/2019- nl pap/HPV  ELISABETH: neg      Health Maintenance   Topic Date Due   • TDAP/TD VACCINES (1 - Tdap) 1971   • ZOSTER VACCINE (1 of 2) 2010   • Annual Gynecologic Pelvic and Breast Exam  2020   • LIPID PANEL  2020   • ANNUAL PHYSICAL  2020   • INFLUENZA VACCINE  2020   • MAMMOGRAM  2022   • PAP SMEAR  2022   • COLONOSCOPY  2026   • HEPATITIS C SCREENING  Completed       Past Medical History:   Diagnosis Date   • Arthritis     HANDS, BACK , FEET, NECK   •  Asthma    • Bladder spasm    • Gastritis    • GERD (gastroesophageal reflux disease)    • IBS (irritable bowel syndrome)    • Migraine    • Palpitations    • Panic attacks    • Panic attacks    • PONV (postoperative nausea and vomiting)    • Premature atrial contractions    • Right sided abdominal pain    • S/P D&C (status post dilation and curettage) 3/3/2020   • Scoliosis    • SOB (shortness of breath)    • Spotting     SCHEDULED FOR D&C   • SVT (supraventricular tachycardia) (CMS/HCC)     14 day Zio Patch starting on 12/22/17: 33 brief runs of SVT (longest 20 beats in length at 153 bpm).    • Tingling     in hands    • Urinary tract infection        Past Surgical History:   Procedure Laterality Date   • COLONOSCOPY N/A 12/5/2016    [facility],    • D&C HYSTEROSCOPY N/A 3/3/2020    Procedure: DILATATION AND CURETTAGE HYSTEROSCOPY, possible myosure;  Surgeon: Giana Paul MD;  Location: [facility];  Service: Obstetrics/Gynecology;  Laterality: N/A;  DILATION AND CURETTAGE HYSTEROSCOPY   • D&C WITH SUCTION      for DUB   • ENDOSCOPY N/A 12/5/2016    z line regular, 40 cm from incisors,  granular gastric mucosa, chronic gastritis   • LASIK           Current Outpatient Medications:   •  Biotin 06254 MCG tablet dispersible, , Disp: , Rfl:   •  ASMANEX 60 METERED DOSES 220 MCG/INH inhaler, Inhale 1 puff Daily., Disp: , Rfl:   •  ASMANEX, 30 METERED DOSES, 110 MCG/INH inhaler, , Disp: , Rfl:   •  azelastine (ASTEPRO) 0.15 % solution nasal spray, 1 spray into the nostril(s) as directed by provider Daily As Needed., Disp: , Rfl:   •  CALCIUM PO, Take 1 tablet by mouth Daily., Disp: , Rfl:   •  clindamycin-benzoyl peroxide (BENZACLIN) 1-5 % gel, , Disp: , Rfl:   •  desloratadine (CLARINEX) 5 MG tablet, Take 5 mg by mouth Every Morning., Disp: , Rfl:   •  dilTIAZem (CARDIZEM) 30 MG tablet, TAKE 1 TABLET TWICE A DAY, Disp: 180 tablet, Rfl: 3  •  estradiol (ESTRACE) 0.1 MG/GM vaginal cream, Insert 1 g into the vagina 2  (Two) Times a Week., Disp: 45 g, Rfl: 6  •  [START ON 6/18/2020] estradiol (VIVELLE-DOT) 0.0375 MG/24HR patch, Place 1 patch on the skin as directed by provider 2 (Two) Times a Week., Disp: 8 patch, Rfl: 3  •  fluticasone (FLONASE) 50 MCG/ACT nasal spray, , Disp: , Rfl:   •  FLUTICASONE PROPIONATE, NASAL, NA, 1 spray into the nostril(s) as directed by provider Daily As Needed., Disp: , Rfl:   •  Glucosamine-Chondroit-Vit C-Mn (GLUCOSAMINE CHONDR 1500 COMPLX PO), Take 2 tablets by mouth daily., Disp: , Rfl:   •  lansoprazole (PREVACID) 30 MG capsule, Take 1 capsule by mouth 2 (Two) Times a Day., Disp: 180 capsule, Rfl: 3  •  montelukast (SINGULAIR) 10 MG tablet, Take 10 mg by mouth Every Night., Disp: , Rfl:   •  Multiple Vitamin (MULTI VITAMIN DAILY PO), Take 1 tablet by mouth daily., Disp: , Rfl:   •  NON FORMULARY, f d guard., Disp: , Rfl:   •  NON FORMULARY, 1 tablet Daily As Needed (F D DANIEL)., Disp: , Rfl:   •  PROAIR  (90 BASE) MCG/ACT inhaler, Inhale 2 puffs Every 4 (Four) Hours As Needed., Disp: , Rfl:   •  probiotic (CULTURELLE) capsule capsule, Take 1 capsule by mouth Daily., Disp: , Rfl:   •  progesterone (PROMETRIUM) 100 MG capsule, Take 1 capsule by mouth Every Night., Disp: 30 capsule, Rfl: 3  •  Turmeric 500 MG tablet, Take 500 mg by mouth Daily., Disp: , Rfl:   •  Unable to find, 1 each 1 (One) Time As Needed. IB GUARD , Disp: , Rfl:   •  uribel (URO-MP) 118 MG capsule capsule, Take 1 capsule by mouth 3 (Three) Times a Day As Needed., Disp: , Rfl:   •  Wheat Dextrin (BENEFIBER) powder, Take 1 application by mouth Daily., Disp: , Rfl:   •  XIIDRA 5 % solution, Administer 1 drop to both eyes 2 (Two) Times a Day., Disp: , Rfl:     Allergies   Allergen Reactions   • Codeine Dizziness   • Levaquin [Levofloxacin] GI Intolerance     FACIAL NUMBNESS   • Qvar [Beclomethasone] GI Intolerance     Tingling on one side of face   • Sulfa Antibiotics Hives   • Erythromycin Diarrhea   • Keflex [Cephalexin]  "Rash       Social History     Tobacco Use   • Smoking status: Never Smoker   • Smokeless tobacco: Never Used   Substance Use Topics   • Alcohol use: Yes     Alcohol/week: 1.0 - 2.0 standard drinks     Types: 1 - 2 Glasses of wine per week     Comment: daily   • Drug use: No       Family History   Problem Relation Age of Onset   • Cancer Father 63        lung-smoker   • Aneurysm Mother 73        AAA   • Scleroderma Maternal Aunt    • Leukemia Maternal Grandmother    • Aneurysm Maternal Grandfather         Cerebral   • Breast cancer Neg Hx    • Ovarian cancer Neg Hx    • Colon cancer Neg Hx    • Deep vein thrombosis Neg Hx    • Pulmonary embolism Neg Hx        Review of Systems   Constitutional: Positive for activity change (quarantine). Negative for appetite change, fatigue, fever and unexpected weight change.   Respiratory: Negative for cough and shortness of breath.    Cardiovascular: Negative for chest pain and palpitations.   Gastrointestinal: Negative for abdominal distention, abdominal pain, constipation, diarrhea and nausea.   Endocrine: Negative for cold intolerance and heat intolerance.   Genitourinary: Positive for vaginal bleeding. Negative for dyspareunia, dysuria, menstrual problem, pelvic pain and vaginal discharge.   Skin: Negative for color change and rash.   Allergic/Immunologic: Negative for environmental allergies and food allergies.   Neurological: Positive for dizziness. Negative for headaches.   Psychiatric/Behavioral: Negative for agitation, behavioral problems, dysphoric mood and sleep disturbance. The patient is not nervous/anxious.    All other systems reviewed and are negative.      /82   Ht 161.3 cm (63.5\")   Wt 78.5 kg (173 lb)   BMI 30.16 kg/m²     Physical Exam   Constitutional: She is oriented to person, place, and time. She appears well-developed and well-nourished.   HENT:   Head: Normocephalic and atraumatic.   Eyes: Conjunctivae are normal. No scleral icterus.   Neck: Neck " supple. No thyromegaly present.   Cardiovascular: Normal rate and regular rhythm.   Pulmonary/Chest: Effort normal and breath sounds normal. Right breast exhibits no inverted nipple, no mass, no nipple discharge, no skin change and no tenderness. Left breast exhibits no inverted nipple, no mass, no nipple discharge, no skin change and no tenderness.   Abdominal: Soft. Bowel sounds are normal. She exhibits no distension and no mass. There is no tenderness. There is no rebound and no guarding. No hernia.   Genitourinary: Uterus normal. Pelvic exam was performed with patient supine. There is no rash, tenderness or lesion on the right labia. There is no rash, tenderness or lesion on the left labia. Cervix exhibits no motion tenderness, no discharge and no friability. Right adnexum displays no mass, no tenderness and no fullness. Left adnexum displays no mass, no tenderness and no fullness. No erythema, tenderness or bleeding in the vagina. No foreign body in the vagina. No signs of injury around the vagina. No vaginal discharge found.   Genitourinary Comments: Mild atrophy  Cystocele noted   Neurological: She is alert and oriented to person, place, and time.   Skin: Skin is warm and dry.   Psychiatric: She has a normal mood and affect. Her behavior is normal. Judgment and thought content normal.   Nursing note and vitals reviewed.    PROCEDURE NOTE    Procedures      Diagnosis  Post menopausal bleeding       No LMP recorded. Patient is postmenopausal.         UCG: not done  Menopausal Yes   HRT  positive   Current contraception: post menopausal status    Applying Universal Precautions I properly identified the patient and sought her signature with informed consent.  The reason for the biopsy was discussed.  Using a betadine prep on the cervix the cervix was grasped with a tenaculum and the uterus sounded to  6 cm.  A disposable Pipelle was used to retrieve the specimen by passing it 5 times into the cavity hoping to  sample more than one area.     Additional procedures necessary were not necessary  The specimen was labelled and placed in a formalin container.  Tissue was adequate  The patient tolerated the procedure    Instructions were given on vaginal rest, OTC tylenol, Motrin or Aleve, and expected future bleeding.  Resulting and follow up were discussed.         Assessment/Plan    1) GYN HM: normal pap/HPV   3/2019SBE demonstrated and encouraged.  2) STD screening: declines Condoms encouraged.  3) Bone health - Weight bearing exercise, dietary calcium recommendations and vitamin D reviewed.   4) Diet and Exercise discussed  5) Smoking Status: No   6) Hot flashes and panic attacks- resolved with HRT  7)MMG: UTD 3/2020- Birads 2  8) DEXA- UTD 4/2019- osteopenia with risk of fx 7 & 1 %, repeat DEXA in 2 years  9)C scope- UTD 12/2016- normal, repeat 10 years  10) Vaginal atrophy and frequent UTI- Rx Estrace x 2/week. Patient counseled that vaginal estrogen rings, creams and tablets are available and highly effective at treating local vaginal symptoms such as atrophy and vaginal dryness.  Vaginal estrogen does not cause uterine overgrowth and does NOT require a progestogen to protect the uterus.  Very small amounts of estrogen are absorbed systemically.  For patients with a history of an estrogen dependent cancer such as breast cancer, the decision to use local estrogen for local vaginal symptoms should be made after consultation with her oncologist.  Possible side effects include local irritation or burning and/or vaginal bleeding and should always be reported.  Pt says it is helping her   11) VB- s/p endo bx today. IF  VB continues, consider another D&C  12) HRT- Patient counseled that hormone treatment should be used to help with specific symptoms related to menopause such as hot flashes, night sweats and vaginal atrophy.  Hormones should not be given for “general wellbeing” or to avoid aging. The lowest dose hormone  that treats symptoms should be used for the shortest amount of time possible.  Although estrogen is the most effective treatment for hot flashes, other non hormonal options exist (such as Brisdelle or venlafaxine) and should also be considered.  Anyone with an intact uterus should also receive progestogen to prevent uterine overgrowth that can lead to uterine cancer.  All hormone therapy, whether it is synthetic or bio identical, can lead to increased risk of stroke, heart attack and thromboembolic diseases.  These adverse events are more likely to develop in the first year of use and in patients who are older than the typical menopausal age.  Risks of estrogen dependent cancers such as breast cancer increase with prolonged use.  Attempts to wean hormone therapy should be discussed annually and particularly after three to five years of use.  Any side effects such as vaginal bleeding or pain should be reported immediately.  We will change to Vivelle dot 0.0375 mg and Prometrium 100 mg QD. RTO 2 months TVUS and recheck symptoms.  13)I saw the patient with a face mask, gloves and a face shield.  The patient herself was masked.  Social distancing was observed as appropriate.  14)Parts of this document have been copied or forwarded from her previous visits and have been reviewed, updated and edited as indicated.   15)Follow up prn and 1 year       Rosalinda was seen today for gynecologic exam.    Diagnoses and all orders for this visit:    Postmenopausal bleeding  -     Reference Histopathology    Screening for unspecified condition  -     POC Urinalysis Dipstick  -     POC Pregnancy, Urine    Other orders  -     estradiol (VIVELLE-DOT) 0.0375 MG/24HR patch; Place 1 patch on the skin as directed by provider 2 (Two) Times a Week.  -     progesterone (PROMETRIUM) 100 MG capsule; Take 1 capsule by mouth Every Night.        Giana Paul MD  6/17/2020  18:21

## 2020-06-19 ENCOUNTER — RESULTS ENCOUNTER (OUTPATIENT)
Dept: INTERNAL MEDICINE | Facility: CLINIC | Age: 60
End: 2020-06-19

## 2020-06-19 DIAGNOSIS — K21.9 GASTROESOPHAGEAL REFLUX DISEASE WITHOUT ESOPHAGITIS: ICD-10-CM

## 2020-06-19 DIAGNOSIS — Z00.00 HEALTH CARE MAINTENANCE: ICD-10-CM

## 2020-06-19 DIAGNOSIS — E78.5 HYPERLIPIDEMIA, UNSPECIFIED HYPERLIPIDEMIA TYPE: ICD-10-CM

## 2020-06-19 LAB
DX ICD CODE: NORMAL
PATH REPORT.FINAL DX SPEC: NORMAL
PATH REPORT.GROSS SPEC: NORMAL
PATH REPORT.SITE OF ORIGIN SPEC: NORMAL
PATHOLOGIST NAME: NORMAL
PAYMENT PROCEDURE: NORMAL

## 2020-06-22 RX ORDER — ESTRADIOL 0.04 MG/D
1 FILM, EXTENDED RELEASE TRANSDERMAL 2 TIMES WEEKLY
Qty: 8 PATCH | Refills: 3 | Status: SHIPPED | OUTPATIENT
Start: 2020-06-22 | End: 2020-06-29 | Stop reason: SDUPTHER

## 2020-06-22 NOTE — PROGRESS NOTES
PIP= endometrial biopsy is normal and shows a small, B9 polyp. If her VB recurs, she will need a D&C

## 2020-06-30 RX ORDER — ESTRADIOL 0.04 MG/D
1 FILM, EXTENDED RELEASE TRANSDERMAL 2 TIMES WEEKLY
Qty: 8 PATCH | Refills: 3 | Status: SHIPPED | OUTPATIENT
Start: 2020-07-02 | End: 2020-07-22 | Stop reason: DRUGHIGH

## 2020-07-08 ENCOUNTER — OFFICE VISIT (OUTPATIENT)
Dept: CARDIOLOGY | Facility: CLINIC | Age: 60
End: 2020-07-08

## 2020-07-08 VITALS
HEIGHT: 64 IN | HEART RATE: 77 BPM | BODY MASS INDEX: 30.15 KG/M2 | SYSTOLIC BLOOD PRESSURE: 134 MMHG | WEIGHT: 176.6 LBS | DIASTOLIC BLOOD PRESSURE: 88 MMHG

## 2020-07-08 DIAGNOSIS — R00.2 PALPITATIONS: Primary | ICD-10-CM

## 2020-07-08 DIAGNOSIS — I49.1 PREMATURE ATRIAL CONTRACTIONS: ICD-10-CM

## 2020-07-08 DIAGNOSIS — I47.1 SVT (SUPRAVENTRICULAR TACHYCARDIA) (HCC): ICD-10-CM

## 2020-07-08 PROCEDURE — 93000 ELECTROCARDIOGRAM COMPLETE: CPT | Performed by: NURSE PRACTITIONER

## 2020-07-08 PROCEDURE — 99214 OFFICE O/P EST MOD 30 MIN: CPT | Performed by: NURSE PRACTITIONER

## 2020-07-08 NOTE — PROGRESS NOTES
[facility] CARDIOLOGY  [address]  [facility] [address]  Phone: [phone]      Patient Name: Rosalinda Boone  :1960  Age: 60 y.o.  Primary Cardiologist: Aidan Samuels MD  Encounter Provider:  ANOOP Knowles      Chief Complaint:   Chief Complaint   Patient presents with   • Rapid Heart Rate         HPI  Rosalinda Boone is a 60 y.o. female who presents today for evaluation of elevated heart rate.     Pt has a  history significant for SVT, PACs and heart palpitations.    Patient reports that over the past few months that she has had an increase in her heart palpitations.  She does note that she is currently going through menopause and has been prescribed hormone replacement therapy with estrogen and progesterone.  She reports that when she first started on the hormone replacement that she had episodes of dizziness, lightheadedness, wooziness.  She reports that that resolved after 48 hours.  She notes that the dosage of her estrogen has been decreased and since that time she has not noted any further episodes of palpitations.  The episodes were described as 2 separate episodes where her heart was beating fast and hard.  She states that when she has panic attacks she also has the symptoms but she reports that she did not have any of the other symptoms associated with her panic attacks at that time.  She states that 1 of the episodes lasted approximately 30 minutes.  She did have associated shortness of breath with the episode.  She denies any chest pain, lower extremity edema, fatigue.  Blood pressure has been controlled at home.      The following portions of the patient's history were reviewed and updated as appropriate: allergies, current medications, past family history, past medical history, past social history, past surgical history and problem list.      Review of Systems   Constitution: Negative for malaise/fatigue.   Cardiovascular: Negative  "for chest pain and leg swelling.   Respiratory: Positive for cough, shortness of breath and snoring.    Musculoskeletal: Positive for joint pain.   Gastrointestinal: Positive for abdominal pain.   Neurological: Positive for light-headedness.   Psychiatric/Behavioral: The patient is nervous/anxious.    All other systems reviewed and are negative.      OBJECTIVE:     Vitals:    07/08/20 1105   BP: 134/88   BP Location: Left arm   Pulse: 77   Weight: 80.1 kg (176 lb 9.6 oz)   Height: 161.3 cm (63.5\")     Body mass index is 30.79 kg/m².    Physical Exam   Constitutional: She is oriented to person, place, and time. Vital signs are normal. She appears well-developed and well-nourished. She is active.   Eyes: Conjunctivae are normal.   Neck: Carotid bruit is not present.   Cardiovascular: Normal rate, regular rhythm and normal heart sounds.   Pulmonary/Chest: Breath sounds normal.   Abdominal: Normal appearance.   Musculoskeletal:   No cyanosis, clubbing, edema  Normal ROM   Neurological: She is alert and oriented to person, place, and time. GCS eye subscore is 4. GCS verbal subscore is 5. GCS motor subscore is 6.   Skin: Skin is warm, dry and intact.   Psychiatric: She has a normal mood and affect. Her speech is normal and behavior is normal. Judgment and thought content normal. Cognition and memory are normal.         ECG 12 Lead  Date/Time: 7/8/2020 11:22 AM  Performed by: Sandra Hunt APRN  Authorized by: Sandra Hunt APRN   Comparison: compared with previous ECG from 2/26/2020  Similar to previous ECG  Rhythm: sinus rhythm  Rate: normal  BPM: 77  Conduction: conduction normal  ST Segments: ST segments normal  T Waves: T waves normal  QRS axis: normal  Other: no other findings    Clinical impression: normal ECG            Cardiac Procedures:  1. 14 day Zio Patch starting on 12/22/17: 33 brief runs of SVT, with the longest being 20 beats in length at 153 bpm.  The fastest was 8 beats at 200 bpm.    2. Stress " echocardiogram on 1/10/18: There was no evidence of ischemia.  The ejection fraction was 60-65%.  Diastolic function was normal.  The right ventricle was normal.  There was no significant valvular disease.  3. Event recorder on 6/20/2018: There were several very brief runs of paroxysmal SVT, ranging from 6-8 beats in length.  PACs occurred occasionally.  Most of the episodes of skipped beats correlated very brief runs of paroxysmal SVT.  Occasional palpitations correlated to isolated PACs.         ASSESSMENT:      Diagnosis Plan   1. Palpitations  ECG 12 Lead         PLAN OF CARE:     Patient with history of paroxysmal SVT and PACs presents today with complaints of heart palpitations.  She states that she has episodes where her heart beats hard and fast.  She does note that she was recently replaced on hormone replacement therapy for menopause.  She states that since decreasing the dose of estrogen the heart palpitations seem to have resolved.  I advised the patient that these are most likely related to the new hormone replacement therapy and very benign.  I informed her that if episodes lasted longer than 30 minutes she could take an extra dose of diltiazem 30 mg.  Advised her that if symptoms returned or became more frequent to please notify me and we can place a monitor to further evaluate.  Patient also notes that she may be needing to schedule a hysterectomy in the near future and will need cardiac risk assessment at that time.  Patient will call the office if she needs cardiac risk assessment for surgery and will otherwise follow-up with Dr. Samuels in 1 year.  Sooner for problems or complications.       Discharge Medications           Accurate as of July 8, 2020 11:48 AM. If you have any questions, ask your nurse or doctor.               Continue These Medications      Instructions Start Date   Asmanex (30 Metered Doses) 110 MCG/INH inhaler  Generic drug:  mometasone   No dose, route, or frequency  recorded.      azelastine 0.15 % solution nasal spray  Commonly known as:  ASTEPRO   1 spray, Nasal, Daily PRN      Benefiber powder   1 application, Oral, Daily      Biotin 40073 MCG tablet dispersible   No dose, route, or frequency recorded.      CALCIUM PO   1 tablet, Oral, Daily      clindamycin-benzoyl peroxide 1-5 % gel  Commonly known as:  BENZACLIN   No dose, route, or frequency recorded.      desloratadine 5 MG tablet  Commonly known as:  CLARINEX   5 mg, Oral, Every Morning      dilTIAZem 30 MG tablet  Commonly known as:  CARDIZEM   TAKE 1 TABLET TWICE A DAY      estradiol 0.0375 MG/24HR patch  Commonly known as:  VIVELLE-DOT   1 patch, Transdermal, 2 Times Weekly      estradiol 0.1 MG/GM vaginal cream  Commonly known as:  ESTRACE   1 g, Vaginal, 2 Times Weekly      FLUTICASONE PROPIONATE (NASAL) NA   1 spray, Nasal, Daily PRN      fluticasone 50 MCG/ACT nasal spray  Commonly known as:  FLONASE   No dose, route, or frequency recorded.      GLUCOSAMINE CHONDR 1500 COMPLX PO   2 tablets, Oral, Daily      lansoprazole 30 MG capsule  Commonly known as:  PREVACID   30 mg, Oral, 2 Times Daily      montelukast 10 MG tablet  Commonly known as:  SINGULAIR   10 mg, Oral, Nightly      MULTI VITAMIN DAILY PO   1 tablet, Oral, Daily      NON FORMULARY   f d guard.       NON FORMULARY   1 tablet, Daily PRN      ProAir  (90 Base) MCG/ACT inhaler  Generic drug:  albuterol sulfate HFA   2 puffs, Inhalation, Every 4 Hours PRN      probiotic capsule capsule   1 capsule, Oral, Daily      progesterone 100 MG capsule  Commonly known as:  PROMETRIUM   100 mg, Oral, Nightly      Turmeric 500 MG tablet   500 mg, Oral, Daily      Unable to find   1 each, Once As Needed, IB GUARD      uribel 118 MG capsule capsule   1 capsule, Oral, 3 Times Daily PRN      Xiidra 5 % ophthalmic solution  Generic drug:  Lifitegrast   1 drop, Both Eyes, 2 Times Daily             Thank you for allowing me to participate in the care of your  patient,         Sandra MENCHACA ANOOP Hunt  [facility]  07/08/20  11:32 AM      **Neha Disclaimer:**  Much of this encounter note is an electronic transcription/translation of spoken language to printed text. The electronic translation of spoken language may permit erroneous, or at times, nonsensical words or phrases to be inadvertently transcribed. Although I have reviewed the note for such errors, some may still exist.

## 2020-07-10 ENCOUNTER — OFFICE VISIT (OUTPATIENT)
Dept: GASTROENTEROLOGY | Facility: CLINIC | Age: 60
End: 2020-07-10

## 2020-07-10 VITALS — WEIGHT: 176 LBS | BODY MASS INDEX: 30.05 KG/M2 | HEIGHT: 64 IN

## 2020-07-10 DIAGNOSIS — K21.9 GASTROESOPHAGEAL REFLUX DISEASE WITHOUT ESOPHAGITIS: ICD-10-CM

## 2020-07-10 DIAGNOSIS — K63.89 SMALL INTESTINAL BACTERIAL OVERGROWTH: ICD-10-CM

## 2020-07-10 DIAGNOSIS — K58.0 IRRITABLE BOWEL SYNDROME WITH DIARRHEA: Primary | ICD-10-CM

## 2020-07-10 DIAGNOSIS — K21.9 GASTROESOPHAGEAL REFLUX DISEASE, ESOPHAGITIS PRESENCE NOT SPECIFIED: ICD-10-CM

## 2020-07-10 DIAGNOSIS — R14.0 ABDOMINAL BLOATING: ICD-10-CM

## 2020-07-10 PROCEDURE — 99214 OFFICE O/P EST MOD 30 MIN: CPT | Performed by: NURSE PRACTITIONER

## 2020-07-10 RX ORDER — LANSOPRAZOLE 30 MG/1
30 CAPSULE, DELAYED RELEASE ORAL 2 TIMES DAILY
Qty: 180 CAPSULE | Refills: 3 | Status: SHIPPED | OUTPATIENT
Start: 2020-07-10 | End: 2021-08-26

## 2020-07-10 NOTE — PROGRESS NOTES
Chief Complaint   Patient presents with   • Follow-up   • Heartburn   • Irritable Bowel Syndrome       Rosalinda Boone is a  60 y.o. female here for a follow up visit for bloating.    HPI  60-year-old female presents today for follow-up visit for IBS And GERD.  She is a patient of Dr. Real.  She was last seen on a telephone visit on 4/10/2020.  She has a history of GERD and admits she does really well with Prevacid 30 mg twice daily.  Some days she just even takes it daily.  She denies any breakthrough reflux at this time.  She also has a history of IBS-D and admits she been doing really good for almost a year but unfortunately she had several rounds of antibiotics earlier this year for UTI and she admits since then her IBS has been awful.  She tells me now her bloating is back and she is more gassy and bloated than usual.  She still on Culturelle probiotics.  She is still having some diarrhea.  She admits dairy is a problem so she has been avoiding it.  She did take 1 round of Xifaxan last year but admits it did more harm than good.  She is never had Alinia.  Last EGD/colonoscopy was done on 12/5/2016.  She denies any dysphagia, reflux, abdominal pain, nausea and vomiting, constipation, rectal bleeding or melena.  She is appetite is good and her weight is stable.  Past Medical History:   Diagnosis Date   • Arthritis     HANDS, BACK , FEET, NECK   • Asthma    • Bladder spasm    • Gastritis    • GERD (gastroesophageal reflux disease)    • IBS (irritable bowel syndrome)    • Migraine    • Palpitations    • Panic attacks    • Panic attacks    • PONV (postoperative nausea and vomiting)    • Premature atrial contractions    • Right sided abdominal pain    • S/P D&C (status post dilation and curettage) 3/3/2020   • Scoliosis    • SOB (shortness of breath)    • Spotting     SCHEDULED FOR D&C   • SVT (supraventricular tachycardia) (CMS/[facility])     14 day Zio Patch starting on 12/22/17: 33 brief runs of SVT (longest 20 beats in  length at 153 bpm).    • Tingling     in hands    • Urinary tract infection        Past Surgical History:   Procedure Laterality Date   • COLONOSCOPY N/A 12/5/2016    NBIH,    • D&C HYSTEROSCOPY N/A 3/3/2020    Procedure: DILATATION AND CURETTAGE HYSTEROSCOPY, possible myosure;  Surgeon: Giana Paul MD;  Location: [facility];  Service: Obstetrics/Gynecology;  Laterality: N/A;  DILATION AND CURETTAGE HYSTEROSCOPY   • D&C WITH SUCTION      for DUB   • ENDOSCOPY N/A 12/5/2016    z line regular, 40 cm from incisors,  granular gastric mucosa, chronic gastritis   • LASIK         Scheduled Meds:    Continuous Infusions:  No current facility-administered medications for this visit.     PRN Meds:.    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   Tobacco Use   • Smoking status: Never Smoker   • Smokeless tobacco: Never Used   Substance and Sexual Activity   • Alcohol use: Yes     Alcohol/week: 1.0 - 2.0 standard drinks     Types: 1 - 2 Glasses of wine per week     Comment: daily   • Drug use: No   • Sexual activity: Yes     Partners: Male     Birth control/protection: Post-menopausal   Social History Narrative    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   HENT: Negative for nosebleeds, postnasal drip, sore throat, trouble swallowing and voice change.    Respiratory: Negative for cough, choking, chest tightness, shortness of breath and wheezing.    Cardiovascular: Negative for chest pain, palpitations and leg swelling.   Gastrointestinal: Positive for abdominal distention and diarrhea. Negative for abdominal pain, anal bleeding, blood in stool, constipation, nausea, rectal pain and vomiting.   Endocrine: Negative for polydipsia, polyphagia and polyuria.   Musculoskeletal: Negative for gait problem.   Skin: Negative for rash and wound.   Allergic/Immunologic: Negative for food allergies.   Neurological: Negative for dizziness, speech difficulty and light-headedness.   Psychiatric/Behavioral: Negative for confusion, self-injury, sleep disturbance and suicidal ideas.       Vitals:       Physical Exam   Constitutional: She is oriented to person, place, and time. She appears well-developed and well-nourished. She does not appear ill. No distress.   HENT:   Head: Normocephalic.   Eyes: Pupils are equal, round, and reactive to light.   Cardiovascular: Normal rate, regular rhythm and normal heart sounds.   Pulmonary/Chest: Effort normal and breath sounds normal.   Abdominal: Soft. Bowel sounds are normal. She exhibits distension. She exhibits no mass. There is no hepatosplenomegaly. There is no tenderness. There is no rebound and no guarding. No hernia.   Musculoskeletal: Normal range of motion.   Neurological: She is alert and oriented to person, place, and time.   Skin: Skin is warm and dry.   Psychiatric: She has a normal mood and affect. Her speech is normal and behavior is normal.  Judgment normal.       No radiology results for the last 7 days     Assessment and plan     1. Irritable bowel syndrome with diarrhea    2. Small intestinal bacterial overgrowth    3. Gastroesophageal reflux disease, esophagitis presence not specified    4. Abdominal bloating  - lansoprazole (PREVACID) 30 MG capsule; Take 1 capsule by mouth 2 (Two) Times a Day.  Dispense: 180 capsule; Refill: 3    IBS-D/SIBO is not well controlled today.  Diarrhea is back with lots of gas and bloating.  She had a bad response to Xifaxan last time so we may need to consider Alinia this time.  For now though I want her to switch of her probiotic.  She is been on Culturelle for a really long time.  We think this may have all started after she had 3 or 4 rounds of different antibiotics for UTI earlier this year.  She tells me she is just not been the same since.  GERD seems well controlled on Prevacid 30 mg twice daily.  Continue GERD precautions.  Continue to avoid dairy.  Patient to call the office in 2 weeks with an update.  Patient to follow-up with me in 1 month.

## 2020-07-22 ENCOUNTER — PROCEDURE VISIT (OUTPATIENT)
Dept: OBSTETRICS AND GYNECOLOGY | Facility: CLINIC | Age: 60
End: 2020-07-22

## 2020-07-22 ENCOUNTER — OFFICE VISIT (OUTPATIENT)
Dept: OBSTETRICS AND GYNECOLOGY | Facility: CLINIC | Age: 60
End: 2020-07-22

## 2020-07-22 VITALS
BODY MASS INDEX: 29.71 KG/M2 | DIASTOLIC BLOOD PRESSURE: 76 MMHG | HEIGHT: 64 IN | WEIGHT: 174 LBS | SYSTOLIC BLOOD PRESSURE: 124 MMHG

## 2020-07-22 DIAGNOSIS — N95.0 POSTMENOPAUSAL BLEEDING: Primary | ICD-10-CM

## 2020-07-22 DIAGNOSIS — T38.5X5D ADVERSE EFFECT OF FEMALE HORMONE REPLACEMENT THERAPY, SUBSEQUENT ENCOUNTER: ICD-10-CM

## 2020-07-22 DIAGNOSIS — Z13.9 SCREENING FOR UNSPECIFIED CONDITION: Primary | ICD-10-CM

## 2020-07-22 DIAGNOSIS — N95.0 PMB (POSTMENOPAUSAL BLEEDING): ICD-10-CM

## 2020-07-22 DIAGNOSIS — R10.2 PELVIC PAIN IN FEMALE: ICD-10-CM

## 2020-07-22 DIAGNOSIS — N83.201 CYST OF RIGHT OVARY: ICD-10-CM

## 2020-07-22 LAB
BILIRUB BLD-MCNC: NEGATIVE MG/DL
CLARITY, POC: CLEAR
COLOR UR: YELLOW
GLUCOSE UR STRIP-MCNC: NEGATIVE MG/DL
KETONES UR QL: NEGATIVE
LEUKOCYTE EST, POC: NEGATIVE
NITRITE UR-MCNC: NEGATIVE MG/ML
PH UR: 6 [PH] (ref 5–8)
PROT UR STRIP-MCNC: NEGATIVE MG/DL
RBC # UR STRIP: NEGATIVE /UL
SP GR UR: 1 (ref 1–1.03)
UROBILINOGEN UR QL: NORMAL

## 2020-07-22 PROCEDURE — 99214 OFFICE O/P EST MOD 30 MIN: CPT | Performed by: OBSTETRICS & GYNECOLOGY

## 2020-07-22 PROCEDURE — 76830 TRANSVAGINAL US NON-OB: CPT | Performed by: OBSTETRICS & GYNECOLOGY

## 2020-07-22 PROCEDURE — 81002 URINALYSIS NONAUTO W/O SCOPE: CPT | Performed by: OBSTETRICS & GYNECOLOGY

## 2020-07-22 RX ORDER — ESTRADIOL 0.03 MG/D
1 FILM, EXTENDED RELEASE TRANSDERMAL 2 TIMES WEEKLY
Qty: 8 PATCH | Refills: 4 | Status: SHIPPED | OUTPATIENT
Start: 2020-07-23 | End: 2020-10-27 | Stop reason: SDUPTHER

## 2020-07-22 NOTE — PROGRESS NOTES
"       Rosalinda Boone is a 60 y.o. patient who presents for follow up of   Chief Complaint   Patient presents with   • Follow-up     US       59 yo est pt here for TVUS and f/u of HRT and  VB. She had a h/o  VB and had a HS D&C in 3/2020 that showed an atrophic cavity. She was placed on Vivelle dot 0.05 and Prometrium 200 mg from Combipatch. She had some dizziness on this dose and so we decreased her vivelle dot to 0.0375 mg. She had an endo bx in 6/2020 that showed a possible polyp. Her dizziness has improved but it is still present at times. Her cardiology workup was normal. She has had some pinkish spotting and her TVUS today shows a 6.16 cm and EL was 0.6cm, which is a decrease in thickness from 1.23 cm on 6/17/2020.     The following portions of the patient's history were reviewed and updated as appropriate: allergies, current medications and problem list.    Review of Systems   Constitutional: Positive for activity change (quarantine). Negative for appetite change, fatigue, fever and unexpected weight change.   Respiratory: Negative for cough and shortness of breath.    Cardiovascular: Negative for chest pain and palpitations.   Gastrointestinal: Negative for abdominal distention, abdominal pain, constipation, diarrhea and nausea.   Endocrine: Negative for cold intolerance and heat intolerance.   Genitourinary: Positive for vaginal bleeding. Negative for dyspareunia, dysuria, menstrual problem, pelvic pain and vaginal discharge.   Skin: Negative for color change and rash.   Allergic/Immunologic: Negative for environmental allergies and food allergies.   Neurological: Positive for dizziness. Negative for headaches.   Psychiatric/Behavioral: Negative for agitation, behavioral problems, dysphoric mood and sleep disturbance. The patient is not nervous/anxious.    All other systems reviewed and are negative.      /76   Ht 161.3 cm (63.5\")   Wt 78.9 kg (174 lb)   BMI 30.34 kg/m²     Physical Exam "   Constitutional: She is oriented to person, place, and time. She appears well-developed and well-nourished.   HENT:   Head: Normocephalic and atraumatic.   Eyes: Conjunctivae are normal. No scleral icterus.   Abdominal: Soft. She exhibits no distension and no mass. There is no tenderness. There is no rebound and no guarding. No hernia.   Neurological: She is alert and oriented to person, place, and time.   Skin: Skin is warm and dry.   Psychiatric: She has a normal mood and affect. Her behavior is normal. Judgment and thought content normal.   Nursing note and vitals reviewed.      A/P:  1. HRT- pt has good symptom control on her HRT but still has some dizziness that seems to be dose related. We will decrease her Vivelle dot 0.025 mg and Prometrium 100 mg.   2.  VB- she has had a normal D&C and a normal endo bx in 3/2020 and 6/2020 , respectively. Her EL has decreased from 1.23 cm to 0.6 cm. Rec we repeat TVUS and visit in 3 months.   3. RHM - UTD 6/2020 annual     Assessment/Plan   Rosalinda was seen today for follow-up.    Diagnoses and all orders for this visit:    Screening for unspecified condition  -     POC Urinalysis Dipstick    PMB (postmenopausal bleeding)    Adverse effect of female hormone replacement therapy, subsequent encounter    Other orders  -     estradiol (VIVELLE-DOT) 0.025 MG/24HR patch; Place 1 patch on the skin as directed by provider 2 (Two) Times a Week.                 No follow-ups on file.      Giana Paul MD  7/22/2020  15:50

## 2020-07-24 ENCOUNTER — TELEPHONE (OUTPATIENT)
Dept: GASTROENTEROLOGY | Facility: CLINIC | Age: 60
End: 2020-07-24

## 2020-07-24 NOTE — TELEPHONE ENCOUNTER
----- Message from Rosalinda Boone sent at 7/24/2020  9:37 AM EDT -----  Regarding: Visit Follow-Up Question  Contact: [phone]  Sang Erwin,  You asked me to follow up with you in 2 weeks following our last visit.  I have started Florastor max dosage of 2 pills twice a day.  The package directions said that for the first couple of weeks your symptoms will get worse and they have!  LOL!  I plan to tough it out for another week and if symptoms do not lessen I will lessen the dosage amount.  The package said that you could do that.  I think that the symptoms (severe bloating, pain, more frequent bathroom issues) are a result of the pills trying to regulate my system.  I will let you know how things go after another week or so.  Thanks and have a good weekend!  Rsoalinda Boone [phone]

## 2020-08-06 LAB
ALBUMIN SERPL-MCNC: 4.4 G/DL (ref 3.5–5.2)
ALBUMIN/GLOB SERPL: 2.4 G/DL
ALP SERPL-CCNC: 55 U/L (ref 39–117)
ALT SERPL-CCNC: 12 U/L (ref 1–33)
AST SERPL-CCNC: 12 U/L (ref 1–32)
BASOPHILS # BLD AUTO: 0.1 10*3/MM3 (ref 0–0.2)
BASOPHILS NFR BLD AUTO: 1.3 % (ref 0–1.5)
BILIRUB SERPL-MCNC: 0.3 MG/DL (ref 0–1.2)
BUN SERPL-MCNC: 14 MG/DL (ref 8–23)
BUN/CREAT SERPL: 20.6 (ref 7–25)
CALCIUM SERPL-MCNC: 8.7 MG/DL (ref 8.6–10.5)
CHLORIDE SERPL-SCNC: 103 MMOL/L (ref 98–107)
CHOLEST SERPL-MCNC: 259 MG/DL (ref 0–200)
CO2 SERPL-SCNC: 24.9 MMOL/L (ref 22–29)
CREAT SERPL-MCNC: 0.68 MG/DL (ref 0.57–1)
EOSINOPHIL # BLD AUTO: 0.14 10*3/MM3 (ref 0–0.4)
EOSINOPHIL NFR BLD AUTO: 1.8 % (ref 0.3–6.2)
ERYTHROCYTE [DISTWIDTH] IN BLOOD BY AUTOMATED COUNT: 11.8 % (ref 12.3–15.4)
GLOBULIN SER CALC-MCNC: 1.8 GM/DL
GLUCOSE SERPL-MCNC: 95 MG/DL (ref 65–99)
HCT VFR BLD AUTO: 40.1 % (ref 34–46.6)
HDLC SERPL-MCNC: 69 MG/DL (ref 40–60)
HGB BLD-MCNC: 13.9 G/DL (ref 12–15.9)
IMM GRANULOCYTES # BLD AUTO: 0.02 10*3/MM3 (ref 0–0.05)
IMM GRANULOCYTES NFR BLD AUTO: 0.3 % (ref 0–0.5)
LDLC SERPL CALC-MCNC: 156 MG/DL (ref 0–100)
LDLC/HDLC SERPL: 2.26 {RATIO}
LYMPHOCYTES # BLD AUTO: 2.38 10*3/MM3 (ref 0.7–3.1)
LYMPHOCYTES NFR BLD AUTO: 30.7 % (ref 19.6–45.3)
MCH RBC QN AUTO: 32.3 PG (ref 26.6–33)
MCHC RBC AUTO-ENTMCNC: 34.7 G/DL (ref 31.5–35.7)
MCV RBC AUTO: 93.3 FL (ref 79–97)
MONOCYTES # BLD AUTO: 0.59 10*3/MM3 (ref 0.1–0.9)
MONOCYTES NFR BLD AUTO: 7.6 % (ref 5–12)
NEUTROPHILS # BLD AUTO: 4.52 10*3/MM3 (ref 1.7–7)
NEUTROPHILS NFR BLD AUTO: 58.3 % (ref 42.7–76)
NRBC BLD AUTO-RTO: 0 /100 WBC (ref 0–0.2)
PLATELET # BLD AUTO: 344 10*3/MM3 (ref 140–450)
POTASSIUM SERPL-SCNC: 4.3 MMOL/L (ref 3.5–5.2)
PROT SERPL-MCNC: 6.2 G/DL (ref 6–8.5)
RBC # BLD AUTO: 4.3 10*6/MM3 (ref 3.77–5.28)
SODIUM SERPL-SCNC: 138 MMOL/L (ref 136–145)
TRIGL SERPL-MCNC: 172 MG/DL (ref 0–150)
TSH SERPL DL<=0.005 MIU/L-ACNC: 1.74 UIU/ML (ref 0.27–4.2)
VLDLC SERPL CALC-MCNC: 34.4 MG/DL
WBC # BLD AUTO: 7.75 10*3/MM3 (ref 3.4–10.8)

## 2020-08-12 ENCOUNTER — OFFICE VISIT (OUTPATIENT)
Dept: INTERNAL MEDICINE | Facility: CLINIC | Age: 60
End: 2020-08-12

## 2020-08-12 VITALS
HEART RATE: 94 BPM | RESPIRATION RATE: 18 BRPM | DIASTOLIC BLOOD PRESSURE: 70 MMHG | HEIGHT: 64 IN | TEMPERATURE: 98 F | OXYGEN SATURATION: 98 % | WEIGHT: 174.2 LBS | BODY MASS INDEX: 29.74 KG/M2 | SYSTOLIC BLOOD PRESSURE: 122 MMHG

## 2020-08-12 DIAGNOSIS — Z00.00 HEALTH CARE MAINTENANCE: Primary | ICD-10-CM

## 2020-08-12 DIAGNOSIS — K58.0 IRRITABLE BOWEL SYNDROME WITH DIARRHEA: ICD-10-CM

## 2020-08-12 DIAGNOSIS — K21.9 GASTROESOPHAGEAL REFLUX DISEASE, ESOPHAGITIS PRESENCE NOT SPECIFIED: ICD-10-CM

## 2020-08-12 PROCEDURE — 99396 PREV VISIT EST AGE 40-64: CPT | Performed by: INTERNAL MEDICINE

## 2020-08-12 RX ORDER — SACCHAROMYCES BOULARDII 250 MG
CAPSULE ORAL
COMMUNITY
Start: 2020-07-13 | End: 2020-09-18 | Stop reason: ALTCHOICE

## 2020-08-12 NOTE — PROGRESS NOTES
Subjective   Rosalinda Almaraz is a 60 y.o. female and is here for a comprehensive physical exam. The patient reports problems - gerd.  Pt has been compliant with meds for GERD.  No sx as long as pt takes medicine as prescribed.  No epigastric pain or reflux sx  She has been having lots of hormone changes with menopause.  giving her some panic attacks.  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   Tobacco Use   • Smoking status: Never Smoker   • Smokeless tobacco: Never Used   Substance and Sexual Activity   • Alcohol use: Yes     Alcohol/week: 1.0 - 2.0 standard drinks     Types: 1 - 2 Glasses of wine per week     Comment: daily   • Drug use: No   • Sexual activity: Yes     Partners: Male     Birth control/protection: Post-menopausal   Social History Narrative    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 [employer]       Family History:   Family History   Problem Relation Age of Onset   • Cancer Father 63        lung-smoker   • Aneurysm Mother 73        AAA   • Scleroderma Maternal Aunt    • Leukemia Maternal Grandmother    • Aneurysm Maternal Grandfather         Cerebral   • Breast cancer Neg Hx    • Ovarian cancer Neg Hx    • Colon cancer Neg Hx    • Deep vein thrombosis Neg Hx    • Pulmonary embolism Neg Hx        Past Medical History:   Past Medical History:   Diagnosis Date   • Arthritis     HANDS, BACK , FEET, NECK   • Asthma    • Bladder spasm    • Gastritis    • GERD (gastroesophageal reflux disease)    • IBS  "(irritable bowel syndrome)    • Migraine    • Palpitations    • Panic attacks    • Panic attacks    • PONV (postoperative nausea and vomiting)    • Premature atrial contractions    • Right sided abdominal pain    • S/P D&C (status post dilation and curettage) 3/3/2020   • Scoliosis    • SOB (shortness of breath)    • Spotting     SCHEDULED FOR D&C   • SVT (supraventricular tachycardia) (CMS/HCC)     14 day Zio Patch starting on 12/22/17: 33 brief runs of SVT (longest 20 beats in length at 153 bpm).    • Tingling     in hands    • Urinary tract infection            Review of Systems    A comprehensive review of systems was negative.    Objective   /70   Pulse 94   Temp 98 °F (36.7 °C) (Temporal)   Resp 18   Ht 161.3 cm (63.5\")   Wt 79 kg (174 lb 3.2 oz)   SpO2 98%   BMI 30.37 kg/m²     General Appearance:    Alert, cooperative, no distress, appears stated age   Head:    Normocephalic, without obvious abnormality, atraumatic   Eyes:    PERRL, conjunctiva/corneas clear, EOM's intact, fundi     benign, both eyes   Ears:    Normal TM's and external ear canals, both ears   Nose:   Nares normal, septum midline, mucosa normal, no drainage    or sinus tenderness   Throat:   Lips, mucosa, and tongue normal; teeth and gums normal   Neck:   Supple, symmetrical, trachea midline, no adenopathy;     thyroid:  no enlargement/tenderness/nodules; no carotid    bruit or JVD   Back:     Symmetric, no curvature, ROM normal, no CVA tenderness   Lungs:     Clear to auscultation bilaterally, respirations unlabored   Chest Wall:    No tenderness or deformity    Heart:    Regular rate and rhythm, S1 and S2 normal, no murmur, rub   or gallop       Abdomen:     Soft, non-tender, bowel sounds active all four quadrants,     no masses, no organomegaly           Extremities:   Extremities normal, atraumatic, no cyanosis or edema   Pulses:   2+ and symmetric all extremities   Skin:   Skin color, texture, turgor normal, no rashes or " lesions   Lymph nodes:   Cervical, supraclavicular, and axillary nodes normal   Neurologic:   CNII-XII intact, normal strength, sensation and reflexes     throughout       Vitals:    08/12/20 0802   BP: 122/70   Pulse: 94   Resp: 18   Temp: 98 °F (36.7 °C)   SpO2: 98%     Body mass index is 30.37 kg/m².      Medications:   Current Outpatient Medications:   •  ASMANEX, 30 METERED DOSES, 110 MCG/INH inhaler, , Disp: , Rfl:   •  azelastine (ASTEPRO) 0.15 % solution nasal spray, 1 spray into the nostril(s) as directed by provider Daily As Needed., Disp: , Rfl:   •  Biotin 93828 MCG tablet dispersible, , Disp: , Rfl:   •  CALCIUM PO, Take 1 tablet by mouth Daily., Disp: , Rfl:   •  clindamycin-benzoyl peroxide (BENZACLIN) 1-5 % gel, , Disp: , Rfl:   •  desloratadine (CLARINEX) 5 MG tablet, Take 5 mg by mouth Every Morning., Disp: , Rfl:   •  dilTIAZem (CARDIZEM) 30 MG tablet, TAKE 1 TABLET TWICE A DAY, Disp: 180 tablet, Rfl: 3  •  estradiol (ESTRACE) 0.1 MG/GM vaginal cream, Insert 1 g into the vagina 2 (Two) Times a Week., Disp: 45 g, Rfl: 6  •  estradiol (VIVELLE-DOT) 0.025 MG/24HR patch, Place 1 patch on the skin as directed by provider 2 (Two) Times a Week., Disp: 8 patch, Rfl: 4  •  FLUTICASONE PROPIONATE, NASAL, NA, 1 spray into the nostril(s) as directed by provider Daily As Needed., Disp: , Rfl:   •  Glucosamine-Chondroit-Vit C-Mn (GLUCOSAMINE CHONDR 1500 COMPLX PO), Take 2 tablets by mouth daily., Disp: , Rfl:   •  lansoprazole (PREVACID) 30 MG capsule, Take 1 capsule by mouth 2 (Two) Times a Day., Disp: 180 capsule, Rfl: 3  •  montelukast (SINGULAIR) 10 MG tablet, Take 10 mg by mouth Every Night., Disp: , Rfl:   •  Multiple Vitamin (MULTI VITAMIN DAILY PO), Take 1 tablet by mouth daily., Disp: , Rfl:   •  NON FORMULARY, 1 tablet Daily As Needed (OSWALDO SANCHEZ)., Disp: , Rfl:   •  PROAIR  (90 BASE) MCG/ACT inhaler, Inhale 2 puffs Every 4 (Four) Hours As Needed., Disp: , Rfl:   •  progesterone (PROMETRIUM) 100  MG capsule, Take 1 capsule by mouth Every Night., Disp: 30 capsule, Rfl: 3  •  saccharomyces boulardii (Florastor) 250 MG capsule, , Disp: , Rfl:   •  Turmeric 500 MG tablet, Take 500 mg by mouth Daily., Disp: , Rfl:   •  Unable to find, 1 each 1 (One) Time As Needed. IB GUARD , Disp: , Rfl:   •  uribel (URO-MP) 118 MG capsule capsule, Take 1 capsule by mouth 3 (Three) Times a Day As Needed., Disp: , Rfl:   •  Wheat Dextrin (BENEFIBER) powder, Take 1 application by mouth Daily., Disp: , Rfl:   •  XIIDRA 5 % solution, Administer 1 drop to both eyes 2 (Two) Times a Day., Disp: , Rfl:      The 10-year ASCVD risk score (Kristopher HERRERA Jr., et al., 2013) is: 3.2%    Values used to calculate the score:      Age: 60 years      Sex: Female      Is Non- : No      Diabetic: No      Tobacco smoker: No      Systolic Blood Pressure: 122 mmHg      Is BP treated: No      HDL Cholesterol: 69 mg/dL      Total Cholesterol: 259 mg/dL    Assessment/Plan   Healthy female exam.      1. Healthcare Maintenance:  2. Patient Counseling:  --Nutrition: Stressed importance of moderation in sodium/caffeine intake, saturated fat and cholesterol, caloric balance, sufficient intake of fresh fruits, vegetables, fiber, calcium and vit D  --Exercise: she does exercise reg  --Substance Abuse: no tob no etoh  --Dental health: she does go to the dentist reg  --Immunizations reviewed. rec shingle vaccine  --Discussed benefits of screening colonoscopy.  3.  HPL- she will keep working on diet and exercise  4.  GERD- ok with current meds   5.  IBS-  Taking probiotic  Seeing gi  IB and FD edward both seem to help   6.  Menopause-  Seeing gyn

## 2020-09-18 ENCOUNTER — OFFICE VISIT (OUTPATIENT)
Dept: GASTROENTEROLOGY | Facility: CLINIC | Age: 60
End: 2020-09-18

## 2020-09-18 VITALS — HEIGHT: 64 IN | TEMPERATURE: 97.9 F | WEIGHT: 176.6 LBS | BODY MASS INDEX: 30.15 KG/M2

## 2020-09-18 DIAGNOSIS — K63.89 SMALL INTESTINAL BACTERIAL OVERGROWTH: ICD-10-CM

## 2020-09-18 DIAGNOSIS — R10.10 PAIN OF UPPER ABDOMEN: ICD-10-CM

## 2020-09-18 DIAGNOSIS — K21.9 GASTROESOPHAGEAL REFLUX DISEASE, ESOPHAGITIS PRESENCE NOT SPECIFIED: ICD-10-CM

## 2020-09-18 DIAGNOSIS — K58.0 IRRITABLE BOWEL SYNDROME WITH DIARRHEA: Primary | ICD-10-CM

## 2020-09-18 PROCEDURE — 99214 OFFICE O/P EST MOD 30 MIN: CPT | Performed by: NURSE PRACTITIONER

## 2020-09-18 NOTE — PROGRESS NOTES
Chief Complaint   Patient presents with   • Irritable Bowel Syndrome       Rosalinda Boone is a  60 y.o. female here for a follow up visit for IBS.    HPI  60-year-old female presents today for follow-up visit for IBS-D.  She is a patient of Dr. Real.  She was last seen in the office on 7/10/2020.  She has a history of IBS-D with SIBO.  She is happy to report since switching from Culturelle to Florastor and now finally [employer] colon health probiotics that she is doing a lot better.  She tells me she was pretty miserable over most of the summer.  However for the last couple of weeks she has been like a new woman.  She is not having any abdominal pain or diarrhea.  Her stools can sometimes still be loose but the fecal urgency and bloating and gas is gone.  She also has a history of GERD and admits she does well as long she takes Prevacid 30 mg twice daily.  She denies any breakthrough reflux at this time.  She reports her bowels are moving well.  She denies any dysphagia, reflux, abdominal pain, nausea and vomiting, diarrhea, constipation, rectal bleeding or melena.  She is her appetite is good and her weight is stable. Her last EGD and colonoscopy was done on 12/5/2016.  Past Medical History:   Diagnosis Date   • Arthritis     HANDS, BACK , FEET, NECK   • Asthma    • Bladder spasm    • Gastritis    • GERD (gastroesophageal reflux disease)    • IBS (irritable bowel syndrome)    • Migraine    • Palpitations    • Panic attacks    • Panic attacks    • PONV (postoperative nausea and vomiting)    • Premature atrial contractions    • Right sided abdominal pain    • S/P D&C (status post dilation and curettage) 3/3/2020   • Scoliosis    • SOB (shortness of breath)    • Spotting     SCHEDULED FOR D&C   • SVT (supraventricular tachycardia) (CMS/[facility])     14 day Zio Patch starting on 12/22/17: 33 brief runs of SVT (longest 20 beats in length at 153 bpm).    • Tingling     in hands    • Urinary tract infection        Past  Surgical History:   Procedure Laterality Date   • COLONOSCOPY N/A 12/5/2016    [facility],    • D&C HYSTEROSCOPY N/A 3/3/2020    Procedure: DILATATION AND CURETTAGE HYSTEROSCOPY, possible myosure;  Surgeon: Giana Paul MD;  Location: [facility];  Service: Obstetrics/Gynecology;  Laterality: N/A;  DILATION AND CURETTAGE HYSTEROSCOPY   • D&C WITH SUCTION      for DUB   • ENDOSCOPY N/A 12/5/2016    z line regular, 40 cm from incisors,  granular gastric mucosa, chronic gastritis   • LASIK         Scheduled Meds:    Continuous Infusions:No current facility-administered medications for this visit.       PRN Meds:.    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   Tobacco Use   • Smoking status: Never Smoker   • Smokeless tobacco: Never Used   Substance and Sexual Activity   • Alcohol use: Yes     Alcohol/week: 1.0 - 2.0 standard drinks     Types: 1 - 2 Glasses of wine per week     Comment: daily   • Drug use: No   • Sexual activity: Yes     Partners: Male     Birth control/protection: Post-menopausal   Social History Narrative    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   HENT: Negative for nosebleeds, postnasal drip, sore throat, trouble swallowing and voice change.    Respiratory: Negative for cough, choking, chest tightness, shortness of breath and wheezing.    Cardiovascular: Negative for chest pain, palpitations and leg swelling.   Gastrointestinal: Negative for abdominal distention, abdominal pain, anal bleeding, blood in stool, constipation, diarrhea, nausea, rectal pain and vomiting.   Endocrine: Negative for polydipsia, polyphagia and polyuria.   Musculoskeletal: Negative for gait problem.   Skin: Negative for rash and wound.   Allergic/Immunologic: Negative for food allergies.   Neurological: Negative for dizziness, speech difficulty and light-headedness.   Psychiatric/Behavioral: Negative for confusion, self-injury, sleep disturbance and suicidal ideas.       Vitals:    09/18/20 1128   Temp: 97.9 °F (36.6 °C)       Physical Exam  Constitutional:       General: She is not in acute distress.     Appearance: She is well-developed. She is not ill-appearing.   HENT:      Head: Normocephalic.   Eyes:      Pupils: Pupils are equal, round, and reactive to light.   Cardiovascular:      Rate and Rhythm: Normal rate and regular rhythm.      Heart sounds: Normal heart sounds.   Pulmonary:      Effort: Pulmonary effort is normal.      Breath sounds: Normal breath sounds.   Abdominal:      General: Bowel sounds are normal. There is no distension.      Palpations: Abdomen is soft. There is no mass.      Tenderness: There is no abdominal tenderness. There is no guarding or rebound.      Hernia: No hernia is present.   Musculoskeletal: Normal range of motion.   Skin:     General: Skin is warm and dry.   Neurological:      Mental Status: She is alert and oriented to person, place, and  time.   Psychiatric:         Speech: Speech normal.         Behavior: Behavior normal.         Judgment: Judgment normal.         No radiology results for the last 7 days     Assessment and plan     1. Irritable bowel syndrome with diarrhea    2. Gastroesophageal reflux disease, esophagitis presence not specified    3. Pain of upper abdomen    4. Small intestinal bacterial overgrowth      IBS-D seems finally under better control with her new probiotic [employer] colon [facility].  Continue probiotics.  GERD seems well controlled on Prevacid 30 mg twice daily.  Continue GERD precautions.  Overall she seems to be doing so much better.  Patient to call the office with any issues.  Patient to follow-up with me in 3 months.

## 2020-09-30 ENCOUNTER — OFFICE VISIT (OUTPATIENT)
Dept: OBSTETRICS AND GYNECOLOGY | Facility: CLINIC | Age: 60
End: 2020-09-30

## 2020-09-30 ENCOUNTER — PROCEDURE VISIT (OUTPATIENT)
Dept: OBSTETRICS AND GYNECOLOGY | Facility: CLINIC | Age: 60
End: 2020-09-30

## 2020-09-30 VITALS
BODY MASS INDEX: 30.05 KG/M2 | SYSTOLIC BLOOD PRESSURE: 122 MMHG | HEIGHT: 64 IN | DIASTOLIC BLOOD PRESSURE: 76 MMHG | WEIGHT: 176 LBS

## 2020-09-30 DIAGNOSIS — Z79.890 HORMONE REPLACEMENT THERAPY (HRT): ICD-10-CM

## 2020-09-30 DIAGNOSIS — N83.201 CYST OF RIGHT OVARY: ICD-10-CM

## 2020-09-30 DIAGNOSIS — N95.0 POSTMENOPAUSAL BLEEDING: Primary | ICD-10-CM

## 2020-09-30 PROCEDURE — 99214 OFFICE O/P EST MOD 30 MIN: CPT | Performed by: OBSTETRICS & GYNECOLOGY

## 2020-09-30 PROCEDURE — 76830 TRANSVAGINAL US NON-OB: CPT | Performed by: OBSTETRICS & GYNECOLOGY

## 2020-10-21 ENCOUNTER — OFFICE VISIT (OUTPATIENT)
Dept: OBSTETRICS AND GYNECOLOGY | Facility: CLINIC | Age: 60
End: 2020-10-21

## 2020-10-21 ENCOUNTER — PREP FOR SURGERY (OUTPATIENT)
Dept: OTHER | Facility: HOSPITAL | Age: 60
End: 2020-10-21

## 2020-10-21 VITALS
BODY MASS INDEX: 29.88 KG/M2 | DIASTOLIC BLOOD PRESSURE: 82 MMHG | HEIGHT: 64 IN | WEIGHT: 175 LBS | SYSTOLIC BLOOD PRESSURE: 130 MMHG

## 2020-10-21 DIAGNOSIS — N95.0 PMB (POSTMENOPAUSAL BLEEDING): Primary | ICD-10-CM

## 2020-10-21 DIAGNOSIS — Z79.890 HORMONE REPLACEMENT THERAPY (HRT): ICD-10-CM

## 2020-10-21 PROCEDURE — 99214 OFFICE O/P EST MOD 30 MIN: CPT | Performed by: OBSTETRICS & GYNECOLOGY

## 2020-10-21 RX ORDER — SODIUM CHLORIDE 9 MG/ML
40 INJECTION, SOLUTION INTRAVENOUS AS NEEDED
Status: CANCELLED | OUTPATIENT
Start: 2020-10-21

## 2020-10-21 RX ORDER — SODIUM CHLORIDE 0.9 % (FLUSH) 0.9 %
3 SYRINGE (ML) INJECTION EVERY 12 HOURS SCHEDULED
Status: CANCELLED | OUTPATIENT
Start: 2020-10-21

## 2020-10-21 RX ORDER — SODIUM CHLORIDE 0.9 % (FLUSH) 0.9 %
1-10 SYRINGE (ML) INJECTION AS NEEDED
Status: CANCELLED | OUTPATIENT
Start: 2020-10-21

## 2020-10-21 RX ORDER — CLINDAMYCIN PHOSPHATE 900 MG/50ML
900 INJECTION INTRAVENOUS ONCE
Status: CANCELLED | OUTPATIENT
Start: 2020-10-21 | End: 2020-10-21

## 2020-10-21 NOTE — PROGRESS NOTES
Rosalinda Boone is a 60 y.o. patient who presents for follow up of   Chief Complaint   Patient presents with   • Pre-op Exam       61 yo est pt here for preop appt for  VB. She was placed on CombiPatch last year and had some postmenopausal bleeding.  She had an ultrasound that showed an endometrial lining of 0.89 cm and in March 2020 underwent a hysteroscopy D&C.  The pathology was benign.  She had a normal, atrophic appearing cavity. In 6/2020 we changed her to Vivelle-Dot 0.05 mg and Prometrium 200 mg.  Her ultrasound then showed a 5.6 cm uterus with an endometrial thickness of 1.23 cm and normal ovaries.she had an endo bx at that time that showed a possible B9 small polyp.. Her US shows a 6.29 cm uterus with an EL 0.83 cm and a small cyst on the R ovary that is simple and measures 1.3 x 1 x 0.8 cm.  Is continuing to have some irregular spotting.  We discussed treatment options including D&C with placement of a Mirena IUD for uterine suppression versus hysterectomy.  She would like to proceed with hysterectomy because she feels that staying on HRT is important her overall mental health and she continues to have spotting and bleeding despite D&Cs and hormonal manipulation. She would like to have her ovaries removed as well.     The following portions of the patient's history were reviewed and updated as appropriate: allergies, current medications and problem list.    Review of Systems   Constitutional: Positive for activity change (quarantine). Negative for appetite change, fatigue, fever and unexpected weight change.   Respiratory: Negative for cough and shortness of breath.    Cardiovascular: Negative for chest pain and palpitations.   Gastrointestinal: Negative for abdominal distention, abdominal pain, constipation, diarrhea and nausea.   Endocrine: Negative for cold intolerance and heat intolerance.   Genitourinary: Positive for menstrual problem and vaginal bleeding. Negative for dyspareunia, dysuria,  "pelvic pain and vaginal discharge.   Skin: Negative for color change and rash.   Allergic/Immunologic: Negative for environmental allergies and food allergies.   Neurological: Negative for dizziness and headaches.   Psychiatric/Behavioral: Negative for agitation, behavioral problems, dysphoric mood and sleep disturbance. The patient is not nervous/anxious.    All other systems reviewed and are negative.      /82   Ht 161.3 cm (63.5\")   Wt 79.4 kg (175 lb)   LMP  (LMP Unknown)   Breastfeeding No   BMI 30.51 kg/m²     Physical Exam  Vitals signs and nursing note reviewed. Exam conducted with a chaperone present.   Constitutional:       Appearance: Normal appearance. She is well-developed.   HENT:      Head: Normocephalic and atraumatic.   Eyes:      General: No scleral icterus.     Conjunctiva/sclera: Conjunctivae normal.   Neck:      Musculoskeletal: Neck supple.      Thyroid: No thyromegaly.   Cardiovascular:      Rate and Rhythm: Normal rate and regular rhythm.   Pulmonary:      Effort: Pulmonary effort is normal.      Breath sounds: Normal breath sounds.   Abdominal:      General: Bowel sounds are normal. There is no distension.      Palpations: Abdomen is soft. There is no mass.      Tenderness: There is no abdominal tenderness. There is no guarding or rebound.      Hernia: No hernia is present.   Skin:     General: Skin is warm and dry.   Neurological:      Mental Status: She is alert and oriented to person, place, and time.   Psychiatric:         Mood and Affect: Mood normal.         Behavior: Behavior normal.         Thought Content: Thought content normal.         Judgment: Judgment normal.         A/P:  1. Persistent  VB- pt has had normal path from D&C in 3/2020 and normal endometrial biopsy 6/2020. We discussed treatment options and she desires to proceed with TLH/BSO and possible DEBBI/BSO. Risks, benefits and alternatives of the procedure were discussed, including , but not limited to: " infection, bleeding, transfusion, injury to adjacent structures, laparotomy, possible non diagnostic findings, possible findings of unexpected malignancy, reoperation, recurrent symptoms, thromboembolic events, aneasthesic complications and death. Pre/intra/postop course was reviewed and all questions answered. Patient was encouraged to call for any additional questions she might have in the future.         Assessment/Plan   Diagnoses and all orders for this visit:    1. PMB (postmenopausal bleeding) (Primary)    2. Hormone replacement therapy (HRT)                 No follow-ups on file.      Giana Paul MD    10/21/2020  14:00 EDT

## 2020-10-22 ENCOUNTER — TRANSCRIBE ORDERS (OUTPATIENT)
Dept: ADMINISTRATIVE | Facility: HOSPITAL | Age: 60
End: 2020-10-22

## 2020-10-22 DIAGNOSIS — Z01.818 OTHER SPECIFIED PRE-OPERATIVE EXAMINATION: Primary | ICD-10-CM

## 2020-10-27 ENCOUNTER — TELEPHONE (OUTPATIENT)
Dept: GASTROENTEROLOGY | Facility: CLINIC | Age: 60
End: 2020-10-27

## 2020-10-27 RX ORDER — ESTRADIOL 0.03 MG/D
1 FILM, EXTENDED RELEASE TRANSDERMAL 2 TIMES WEEKLY
Qty: 24 PATCH | Refills: 1 | Status: SHIPPED | OUTPATIENT
Start: 2020-10-29 | End: 2021-03-17 | Stop reason: SDUPTHER

## 2020-10-27 RX ORDER — ESTRADIOL 0.1 MG/G
1 CREAM VAGINAL 2 TIMES WEEKLY
Qty: 90 G | Refills: 6 | Status: SHIPPED | OUTPATIENT
Start: 2020-10-29 | End: 2020-11-05

## 2020-10-27 NOTE — TELEPHONE ENCOUNTER
----- Message from Rosalinda Boone sent at 10/27/2020  9:14 AM EDT -----  Regarding: Visit Follow-Up Question  Contact: [phone]  Sang Erwin,  I wanted to update you. As you know I have IBS and bacterial overgrowth.  Since our last appointment, I am back to all the same degree of symptoms as before, chronic, painful bloating, indigestion, most of the time and heartburn occassionally.  I have switched probiotics back to Cutrelle for about 2 weeks and no change so far.  I know it takes multiple weeks.  Should I be doing something else?  Also, I am going to have a total hysterectomy on Nov. 17.  Is there any chance that it may help?  I have read some things that lead me to believe it could.  Thanks again and have a good week,  Rosalinda Boone [phone]

## 2020-10-27 NOTE — TELEPHONE ENCOUNTER
Yes try to give it some time.  The probiotics can take up to 4 weeks to work.  And having a hysterectomy may actually help things to.  Definitely follow-up with me after her hysterectomy so we can see how you are doing.

## 2020-11-05 RX ORDER — ESTRADIOL 0.1 MG/G
CREAM VAGINAL
Qty: 42.5 G | Refills: 0 | Status: SHIPPED | OUTPATIENT
Start: 2020-11-05 | End: 2020-11-13 | Stop reason: SDUPTHER

## 2020-11-10 ENCOUNTER — APPOINTMENT (OUTPATIENT)
Dept: PREADMISSION TESTING | Facility: HOSPITAL | Age: 60
End: 2020-11-10

## 2020-11-10 VITALS
HEIGHT: 63 IN | OXYGEN SATURATION: 96 % | DIASTOLIC BLOOD PRESSURE: 78 MMHG | RESPIRATION RATE: 16 BRPM | HEART RATE: 88 BPM | SYSTOLIC BLOOD PRESSURE: 120 MMHG | WEIGHT: 174.3 LBS | BODY MASS INDEX: 30.88 KG/M2

## 2020-11-10 DIAGNOSIS — N95.0 PMB (POSTMENOPAUSAL BLEEDING): ICD-10-CM

## 2020-11-10 LAB
ABO GROUP BLD: NORMAL
ANION GAP SERPL CALCULATED.3IONS-SCNC: 10.6 MMOL/L (ref 5–15)
BLD GP AB SCN SERPL QL: NEGATIVE
BUN SERPL-MCNC: 10 MG/DL (ref 8–23)
BUN/CREAT SERPL: 14.3 (ref 7–25)
CALCIUM SPEC-SCNC: 9.7 MG/DL (ref 8.6–10.5)
CHLORIDE SERPL-SCNC: 102 MMOL/L (ref 98–107)
CO2 SERPL-SCNC: 24.4 MMOL/L (ref 22–29)
CREAT SERPL-MCNC: 0.7 MG/DL (ref 0.57–1)
DEPRECATED RDW RBC AUTO: 44.4 FL (ref 37–54)
ERYTHROCYTE [DISTWIDTH] IN BLOOD BY AUTOMATED COUNT: 12.4 % (ref 12.3–15.4)
GFR SERPL CREATININE-BSD FRML MDRD: 85 ML/MIN/1.73
GLUCOSE SERPL-MCNC: 93 MG/DL (ref 65–99)
HCT VFR BLD AUTO: 44.8 % (ref 34–46.6)
HGB BLD-MCNC: 14.3 G/DL (ref 12–15.9)
MCH RBC QN AUTO: 31.2 PG (ref 26.6–33)
MCHC RBC AUTO-ENTMCNC: 31.9 G/DL (ref 31.5–35.7)
MCV RBC AUTO: 97.6 FL (ref 79–97)
PLATELET # BLD AUTO: 339 10*3/MM3 (ref 140–450)
PMV BLD AUTO: 9.4 FL (ref 6–12)
POTASSIUM SERPL-SCNC: 3.9 MMOL/L (ref 3.5–5.2)
RBC # BLD AUTO: 4.59 10*6/MM3 (ref 3.77–5.28)
RH BLD: POSITIVE
SODIUM SERPL-SCNC: 137 MMOL/L (ref 136–145)
T&S EXPIRATION DATE: NORMAL
WBC # BLD AUTO: 9.43 10*3/MM3 (ref 3.4–10.8)

## 2020-11-10 PROCEDURE — 85027 COMPLETE CBC AUTOMATED: CPT

## 2020-11-10 PROCEDURE — 80048 BASIC METABOLIC PNL TOTAL CA: CPT

## 2020-11-10 PROCEDURE — 36415 COLL VENOUS BLD VENIPUNCTURE: CPT

## 2020-11-10 PROCEDURE — 86850 RBC ANTIBODY SCREEN: CPT

## 2020-11-10 PROCEDURE — 86901 BLOOD TYPING SEROLOGIC RH(D): CPT

## 2020-11-10 PROCEDURE — 86900 BLOOD TYPING SEROLOGIC ABO: CPT

## 2020-11-10 RX ORDER — GLUCOSAMINE/D3/BOSWELLIA SERRA 1500MG-400
1 TABLET ORAL DAILY
COMMUNITY

## 2020-11-10 RX ORDER — PEPPERMINT OIL 90 MG
2 CAPSULE, DELAYED, AND EXTENDED RELEASE ORAL 3 TIMES DAILY PRN
COMMUNITY

## 2020-11-10 RX ORDER — SACCHAROMYCES BOULARDII 250 MG
500 CAPSULE ORAL DAILY
COMMUNITY
End: 2021-04-16

## 2020-11-10 NOTE — DISCHARGE INSTRUCTIONS
PRE-ADMISSION TESTING INSTRUCTIONS FOR ADULTS    Take these medications the morning of surgery with a small sip of water:  Use your inhalers, take diltiazem and lansoprazole      No aspirin, advil, aleve, ibuprofen, naproxen, diet pills, decongestants, or herbal/vitamins for a week prior to surgery.  Stop supplements and vitamins today    General Instructions:    • Do not eat solid food after midnight the night before surgery.  No gum, mints, or hard candy after midnight the night before surgery.  • You may drink clear liquids the day of surgery up until 2 hours before your arrival time.  (7:00 am)  • Clear liquids are liquids you can see through. Nothing RED in color.    Plain water    Sports drinks  Sodas     Gelatin (Jell-O)  Fruit juices without pulp such as white grape juice and apple juice  Popsicles that contain no fruit or yogurt  Tea or coffee (no cream or milk added)    • It is beneficial for you to have a clear drink that contains carbohydrates just before you leave your house and before your fasting time begins.  We suggest a 20 ounce bottle of Gatorade or Powerade for non-diabetic patients or a 20 ounce bottle of G2 or Powerade Zero for diabetic patients.   (7:00 am)    • Patients who avoid smoking, chewing tobacco and alcohol for 4 weeks prior to surgery have a reduced risk of post-operative complications.  If at all possible, quit smoking as many days before surgery as you can.    • Do not smoke, use chewing tobacco or drink alcohol the day of surgery    • Bring your C-PAP/ BI-PAP machine if you use one.  • Wear clean comfortable clothes and socks.  • Do not wear contact lenses, lotion, deodorant, or make-up.  Bring a case for your glasses if applicable. You may brush your teeth the morning of surgery.  • You may wear dentures/partials, do not put adhesive/glue on them.  • Bring crutches or walker if applicable.  • Leave all other jewelry and valuables at home.      Preventing a Surgical Site  Infection:    • Shower the night before and on the morning of surgery using the chlorhexidine soap you were given.  Use a clean washcloth with the soap.  Place clean sheets on your bed after showering the night before surgery. Do not use the CHG soap on your hair, face, or private areas. Wash your body gently for five (5) minutes. Do not scrub your skin.  Dry with a clean towel and dress in clean clothing.    • Do not shave the surgical area for 10 days-2 weeks prior to surgery  because the razor can irritate skin and make it easier to develop an infection.  • Make sure you, your family, and all healthcare providers clean their hands with soap and water or an alcohol based hand  before caring for you or your wound.      Day of surgery:    Your surgeon’s office will advise you of your arrival time for the day of surgery.    Upon arrival, a Pre-op nurse and Anesthesia provider will review your health history, obtain vital signs, and answer questions you may have.  The only belongings needed at this time will be your home medications and if applicable your C-PAP/BI-PAP machine.  If you are staying overnight your family can leave the rest of your belongings in the car and bring them to your room later.  A Pre-op nurse will start an IV and you may receive medication in preparation for surgery, including something to help you relax.  Your family will be able to see you in the Pre-op area.  While you are in surgery your family should notify the waiting room  if they leave the waiting room area and provide a contact phone number.    IF you have any questions, you can call the Pre-Admission Department at [phone] or your surgeon's office.  Notify your surgeon if  you become sick, have a fever, productive cough, or cannot be here the day of surgery    Please be aware that surgery does come with discomfort.  We want to make every effort to control your discomfort so please discuss any uncontrolled  symptoms with your nurse.   Your doctor will most likely have prescribed pain medications.      If you are going home after surgery, you will receive individualized written care instructions before being discharged.  A responsible adult (over the age of 18) must drive you to and from the hospital on the day of your surgery and stay with you for 24 hours after anesthesia.    If you are staying overnight following surgery, you will be transported to your hospital room following the recovery period.  [facility] has all private rooms.    You may receive a survey regarding the care you received. Your feedback is very important and will be used to collect the necessary data to help us to continue to provide excellent care.     Deductibles and co-payments are collected on the day of service. Please be prepared to pay the required co-pay, deductible or deposit on the day of service as defined by your plan.    Total Laparoscopic Hysterectomy  A total laparoscopic hysterectomy is a minimally invasive surgery to remove your uterus and cervix. This surgery is performed by making several small cuts (incisions) in your abdomen. It can also be done with a thin, lighted tube (laparoscope) inserted into two small incisions in your lower abdomen. Your fallopian tubes and ovaries can be removed (bilateral salpingo-oophorectomy) during this surgery as well. Benefits of minimally invasive surgery include:  · Less pain.  · Less risk of blood loss.  · Less risk of infection.  · Quicker return to normal activities.  LET YOUR HEALTH CARE PROVIDER KNOW ABOUT:  · Any allergies you have.  · All medicines you are taking, including vitamins, herbs, eye drops, creams, and over-the-counter medicines.  · Previous problems you or members of your family have had with the use of anesthetics.  · Any blood disorders you have.  · Previous surgeries you have had.  · Medical conditions you have.  RISKS AND COMPLICATIONS   Generally, this is a  safe procedure. However, as with any procedure, complications can occur. Possible complications include:  · Bleeding.  · Blood clots in the legs or lung.  · Infection.  · Injury to surrounding organs.  · Problems with anesthesia.  · Early menopause symptoms (hot flashes, night sweats, insomnia).  · Risk of conversion to an open abdominal incision.  BEFORE THE PROCEDURE  · Ask your health care provider about changing or stopping your regular medicines.  · Do not take aspirin or blood thinners (anticoagulants) for 1 week before the surgery or as told by your health care provider.  · Do not eat or drink anything for 8 hours before the surgery or as told by your health care provider.  · Quit smoking if you smoke.  · Arrange for a ride home after surgery and for someone to help you at home during recovery.  PROCEDURE   · You will be given antibiotic medicine.  · An IV tube will be placed in your arm. You will be given medicine to make you sleep (general anesthetic).  · A chandra catheter will be placed to drain your bladder during surgery. This is usually removed within 24 hours.  · A gas (carbon dioxide) will be used to inflate your abdomen. This will allow your surgeon to look inside your abdomen, perform your surgery, and treat any other problems found if necessary.  · Three or four small incisions (often less than 1/2 inch) will be made in your abdomen. One of these incisions will be made in the area of your belly button (navel). The laparoscope will be inserted into the incision. Your surgeon will look through the laparoscope while doing your procedure.  · Other surgical instruments will be inserted through the other incisions.  · Your uterus may be removed through your vagina or cut into small pieces and removed through the small incisions.  · Your incisions will be closed.  AFTER THE PROCEDURE  · The gas will be released from inside your abdomen.  · You will be taken to the recovery area where a nurse will watch and  check your progress. Once you are awake, stable, and taking fluids well, without other problems, you will return to your room or be allowed to go home.  · There is usually minimal discomfort following the surgery because the incisions are so small.  · You will be given pain medicine while you are in the hospital and for when you go home.     This information is not intended to replace advice given to you by your health care provider. Make sure you discuss any questions you have with your health care provider.     Document Released: 10/14/2008 Document Revised: 08/20/2014 Document Reviewed: 07/08/2014  [employer]® Patient Information ©2016 [employer].        Abdominal Hysterectomy  Abdominal hysterectomy is a surgical procedure to remove your womb (uterus). Your uterus is the muscular organ that contains a developing baby. This surgery is done for many reasons. You may need an abdominal hysterectomy if you have cancer, growths (tumors), long-term pain, or bleeding. You may also have this procedure if your uterus has slipped down into your vagina (uterine prolapse).  Depending on why you need an abdominal hysterectomy, you may also have other reproductive organs removed. These could include the part of your vagina that connects with your uterus (cervix), the organs that make eggs (ovaries), and the tubes that connect the ovaries to the uterus (fallopian tubes).  LET YOUR HEALTH CARE PROVIDER KNOW ABOUT:   · Any allergies you have.  · All medicines you are taking, including vitamins, herbs, eye drops, creams, and over-the-counter medicines.  · Previous problems you or members of your family have had with the use of anesthetics.  · Any blood disorders you have.  · Previous surgeries you have had.  · Medical conditions you have.  RISKS AND COMPLICATIONS  Generally, this is a safe procedure. However, as with any procedure, problems can occur. Infection is the most common problem after an abdominal hysterectomy. Other  possible problems include:  · Bleeding.  · Formation of blood clots that may break free and travel to your lungs.  · Injury to other organs near your uterus.  · Nerve injury causing nerve pain.  · Decreased interest in sex or pain during sexual intercourse.  BEFORE THE PROCEDURE  · Abdominal hysterectomy is a major surgical procedure. It can affect the way you feel about yourself. Talk to your health care provider about the physical and emotional changes hysterectomy may cause.  · You may need to have blood work and X-rays done before surgery.  · Quit smoking if you smoke. Ask your health care provider for help if you are struggling to quit.  · Stop taking medicines that thin your blood as directed by your health care provider.  · You may be instructed to take antibiotic medicines or laxatives before surgery.  · Do not eat or drink anything for 6-8 hours before surgery.  · Take your regular medicines with a small sip of water.  · Bathe or shower the night or morning before surgery.  PROCEDURE  · Abdominal hysterectomy is done in the operating room at the hospital.  · In most cases, you will be given a medicine that makes you go to sleep (general anesthetic).  · The surgeon will make a cut (incision) through the skin in your lower belly.  · The incision may be about 5-7 inches long. It may go side-to-side or up-and-down.  · The surgeon will move aside the body tissue that covers your uterus. The surgeon will then carefully take out your uterus along with any of your other reproductive organs that need to be removed.  · Bleeding will be controlled with clamps or sutures.  · The surgeon will close your incision with sutures or metal clips.  · A chandra catheter will be placed in your bladder and is usually removed within 24 hours.  AFTER THE PROCEDURE  · You will have some pain immediately after the procedure.  · You will be given pain medicine in the recovery room.  · You will be taken to your hospital room when you  have recovered from the anesthesia.  · You may need to stay in the hospital for 2-5 days.  · You will be given instructions for recovery at home.     This information is not intended to replace advice given to you by your health care provider. Make sure you discuss any questions you have with your health care provider.     Document Released: 12/23/2014 Document Reviewed: 12/23/2014  [employer]® Patient Information ©2016 [employer].

## 2020-11-10 NOTE — PAT
Pt here for PAT visit.  Pre-op tests completed, chg soap given, and instructions reviewed.  Instructed clears until 7:00 am dos, voiced understanding.  COVID 11/14

## 2020-11-13 RX ORDER — ESTRADIOL 0.1 MG/G
1 CREAM VAGINAL 2 TIMES WEEKLY
Qty: 42.5 G | Refills: 4 | Status: SHIPPED | OUTPATIENT
Start: 2020-11-16 | End: 2021-11-30 | Stop reason: SDUPTHER

## 2020-11-14 ENCOUNTER — LAB (OUTPATIENT)
Dept: LAB | Facility: HOSPITAL | Age: 60
End: 2020-11-14

## 2020-11-14 DIAGNOSIS — Z01.818 OTHER SPECIFIED PRE-OPERATIVE EXAMINATION: ICD-10-CM

## 2020-11-14 PROCEDURE — C9803 HOPD COVID-19 SPEC COLLECT: HCPCS

## 2020-11-14 PROCEDURE — U0004 COV-19 TEST NON-CDC HGH THRU: HCPCS

## 2020-11-16 ENCOUNTER — ANESTHESIA EVENT (OUTPATIENT)
Dept: PERIOP | Facility: HOSPITAL | Age: 60
End: 2020-11-16

## 2020-11-16 LAB — SARS-COV-2 RNA RESP QL NAA+PROBE: NOT DETECTED

## 2020-11-16 PROCEDURE — S0260 H&P FOR SURGERY: HCPCS | Performed by: OBSTETRICS & GYNECOLOGY

## 2020-11-16 NOTE — H&P
Patient Care Team:  Shraddha Sosa MD as PCP - General (Internal Medicine)  Candelario Sears MD as Consulting Physician (Allergy)    Chief complaint persistent  VB       HPI: 59 yo est pt here for TLH/BSO for persistent  VB. She was placed on CombiPatch last year and had some postmenopausal bleeding.  She had an ultrasound that showed an endometrial lining of 0.89 cm and in March 2020 underwent a hysteroscopy D&C.  The pathology was benign.  She had a normal, atrophic appearing cavity. In 6/2020 we changed her to Vivelle-Dot 0.05 mg and Prometrium 200 mg.  Her ultrasound then showed a 5.6 cm uterus with an endometrial thickness of 1.23 cm and normal ovaries.she had an endo bx at that time that showed a possible B9 small polyp.. Her US shows a 6.29 cm uterus with an EL 0.83 cm and a small cyst on the R ovary that is simple and measures 1.3 x 1 x 0.8 cm.  Is continuing to have some irregular spotting.  We discussed treatment options including D&C with placement of a Mirena IUD for uterine suppression versus hysterectomy.  She would like to proceed with hysterectomy because she feels that staying on HRT is important her overall mental health and she continues to have spotting and bleeding despite D&Cs and hormonal manipulation. She would like to have her ovaries removed as well.          PMH:   Past Medical History:   Diagnosis Date   • Arthritis     HANDS, BACK , FEET, NECK   • Asthma    • Bladder spasm    • Gastritis    • GERD (gastroesophageal reflux disease)    • IBS (irritable bowel syndrome)    • Migraine    • Palpitations     lov cardiology 7/2020   • Panic attacks    • Panic attacks    • PONV (postoperative nausea and vomiting)    • Premature atrial contractions    • Right sided abdominal pain    • S/P D&C (status post dilation and curettage) 3/3/2020   • Scoliosis    • SOB (shortness of breath)    • Spotting     SCHEDULED FOR D&C   • SVT (supraventricular tachycardia) (CMS/HCC)     14 day Zio Patch  starting on 17: 33 brief runs of SVT (longest 20 beats in length at 153 bpm).    • Tingling     in hands    • Urinary tract infection          PSH:   Past Surgical History:   Procedure Laterality Date   • COLONOSCOPY N/A 2016    [facility],    • D&C HYSTEROSCOPY N/A 3/3/2020    Procedure: DILATATION AND CURETTAGE HYSTEROSCOPY, possible myosure;  Surgeon: Giana Paul MD;  Location: [facility];  Service: Obstetrics/Gynecology;  Laterality: N/A;  DILATION AND CURETTAGE HYSTEROSCOPY   • D&C WITH SUCTION      for DUB   • ENDOSCOPY N/A 2016    z line regular, 40 cm from incisors,  granular gastric mucosa, chronic gastritis   • LASIK         SoHx:   Social History     Socioeconomic History   • Marital status:      Spouse name: MATTIE ALMARAZ   • Number of children: 2   • Years of education: Not on file   • Highest education level: Not on file   Occupational History   • Occupation: [employer], [employer]     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   Tobacco Use   • Smoking status: Never Smoker   • Smokeless tobacco: Never Used   Substance and Sexual Activity   • Alcohol use: Yes     Alcohol/week: 14.0 standard drinks     Types: 14 Glasses of wine per week     Comment: 2 glasses/nightly with dinner   • Drug use: No   • Sexual activity: Yes     Partners: Male     Birth control/protection: Post-menopausal   Social History Narrative    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 [employer]       FHx:   Family History   Problem Relation Age of Onset   • Cancer Father 63        lung-smoker   • Aneurysm Mother 73        AAA   • Scleroderma Maternal Aunt    • Leukemia Maternal Grandmother    • Aneurysm Maternal Grandfather         Cerebral   • Breast cancer Neg Hx    • Ovarian cancer Neg Hx    • Colon cancer Neg Hx    • Deep vein thrombosis Neg Hx    • Pulmonary embolism Neg Hx    • Malig Hyperthermia Neg Hx      OB History         2    Para   2    Term   2            AB        Living   2        SAB         TAB        Ectopic        Molar        Multiple        Live Births               Obstetric Comments   2                 Menarche:13  Menopause:52  HRT:yes x 3 years  Current contraception: post menopausal status  History of abnormal Pap smear: no  History of abnormal mammogram: no  Family history of uterine, colon or ovarian cancer: no  Family history of breast cancer: no  STD's: none  Last pap smear:3/2019- nl pap/HPV  ELISABETH: neg              Allergies: Codeine, Levaquin [levofloxacin], Qvar [beclomethasone], Sulfa antibiotics, Erythromycin, and Keflex [cephalexin]    Medications:   No current facility-administered medications on file prior to encounter.      Current Outpatient Medications on File Prior to Encounter   Medication Sig Dispense Refill   • ASMANEX, 30 METERED DOSES, 110 MCG/INH inhaler Inhale 1-2 puffs Daily. Can use twice a day if needed     • azelastine (ASTEPRO) 0.15 % solution nasal spray 1 spray into the nostril(s) as directed by provider Daily As Needed.     • clindamycin-benzoyl peroxide (BENZACLIN) 1-5 % gel Apply 1 application topically to the appropriate area as directed 2 (Two) Times a Day As Needed (acne).     • desloratadine (CLARINEX) 5 MG tablet Take 5 mg by mouth Every Morning.     • dilTIAZem (CARDIZEM) 30 MG tablet TAKE 1 TABLET TWICE A DAY (Patient taking differently: Take 30 mg by mouth 2 (two) times a day.) 180 tablet 3   • FLUTICASONE PROPIONATE, NASAL, NA 1 spray into the nostril(s) as directed by provider Daily As Needed.     • Glucosamine-Chondroit-Vit C-Mn (GLUCOSAMINE CHONDR 1500 COMPLX PO) Take 2 tablets by mouth daily.     • lansoprazole (PREVACID) 30 MG capsule Take 1 capsule by mouth 2 (Two) Times a Day. 180 capsule 3   • montelukast (SINGULAIR) 10 MG tablet Take 10 mg by mouth Every Night.     • Multiple Vitamin (MULTI VITAMIN DAILY PO) Take 2 tablets by mouth Daily.     • PROAIR  (90 BASE) MCG/ACT inhaler Inhale 2 puffs Every 4 (Four) Hours As Needed.     •  progesterone (PROMETRIUM) 100 MG capsule Take 1 capsule by mouth Every Night. (Patient taking differently: Take 200 mg by mouth Every Night.) 30 capsule 3   • Turmeric 500 MG tablet Take 500 mg by mouth Daily.     • uribel (URO-MP) 118 MG capsule capsule Take 1 capsule by mouth 3 (Three) Times a Day As Needed.     • Wheat Dextrin (BENEFIBER) powder Take 1 packet by mouth Daily.     • XIIDRA 5 % solution Administer 1 drop to both eyes 2 (Two) Times a Day.         LMP  (LMP Unknown)     Review of Systems   Constitutional: Positive for activity change (quarantine). Negative for appetite change, fatigue, fever and unexpected weight change.   Respiratory: Negative for cough and shortness of breath.    Cardiovascular: Negative for chest pain and palpitations.   Gastrointestinal: Negative for abdominal distention, abdominal pain, constipation, diarrhea and nausea.   Endocrine: Negative for cold intolerance and heat intolerance.   Genitourinary: Positive for menstrual problem and vaginal bleeding. Negative for dyspareunia, dysuria, pelvic pain and vaginal discharge.   Skin: Negative for color change and rash.   Allergic/Immunologic: Negative for environmental allergies and food allergies.   Neurological: Negative for dizziness and headaches.   Psychiatric/Behavioral: Negative for agitation, behavioral problems, dysphoric mood and sleep disturbance. The patient is not nervous/anxious.    All other systems reviewed and are negative.      Physical Exam  Vitals signs and nursing note reviewed. Exam conducted with a chaperone present.   Constitutional:       Appearance: Normal appearance. She is well-developed.   HENT:      Head: Normocephalic and atraumatic.   Eyes:      General: No scleral icterus.     Conjunctiva/sclera: Conjunctivae normal.   Neck:      Musculoskeletal: Neck supple.      Thyroid: No thyromegaly.   Cardiovascular:      Rate and Rhythm: Normal rate and regular rhythm.   Pulmonary:      Effort: Pulmonary effort  is normal.      Breath sounds: Normal breath sounds.   Abdominal:      General: Bowel sounds are normal. There is no distension.      Palpations: Abdomen is soft. There is no mass.      Tenderness: There is no abdominal tenderness. There is no guarding or rebound.      Hernia: No hernia is present.   Skin:     General: Skin is warm and dry.   Neurological:      Mental Status: She is alert and oriented to person, place, and time.   Psychiatric:         Mood and Affect: Mood normal.         Behavior: Behavior normal.         Thought Content: Thought content normal.         Judgment: Judgment normal.         Debilities/Disabilities Identified: None    Labs:     Lab Results (last 7 days)     ** No results found for the last 168 hours. **          Assessment/Plan:  1. Persistent  VB- pt has had normal path from D&C in 3/2020 and normal endometrial biopsy 6/2020. We discussed treatment options and she desires to proceed with TLH/BSO and possible DEBBI/BSO. Risks, benefits and alternatives of the procedure were discussed, including , but not limited to: infection, bleeding, transfusion, injury to adjacent structures, laparotomy, possible non diagnostic findings, possible findings of unexpected malignancy, reoperation, recurrent symptoms, thromboembolic events, aneasthesic complications and death. Pre/intra/postop course was reviewed and all questions answered    I discussed the patients findings and my recommendations with patient.     Giana Paul MD  11/16/20  16:33 EST

## 2020-11-17 ENCOUNTER — ANESTHESIA (OUTPATIENT)
Dept: PERIOP | Facility: HOSPITAL | Age: 60
End: 2020-11-17

## 2020-11-17 ENCOUNTER — HOSPITAL ENCOUNTER (OUTPATIENT)
Facility: HOSPITAL | Age: 60
Discharge: HOME | End: 2020-11-18
Attending: OBSTETRICS & GYNECOLOGY | Admitting: OBSTETRICS & GYNECOLOGY

## 2020-11-17 DIAGNOSIS — N95.0 PMB (POSTMENOPAUSAL BLEEDING): ICD-10-CM

## 2020-11-17 DIAGNOSIS — Z90.710 STATUS POST LAPAROSCOPIC HYSTERECTOMY: Primary | ICD-10-CM

## 2020-11-17 PROCEDURE — 25010000002 ONDANSETRON PER 1 MG: Performed by: NURSE ANESTHETIST, CERTIFIED REGISTERED

## 2020-11-17 PROCEDURE — 25010000002 FENTANYL CITRATE (PF) 100 MCG/2ML SOLUTION: Performed by: NURSE ANESTHETIST, CERTIFIED REGISTERED

## 2020-11-17 PROCEDURE — 25010000002 GENTAMICIN PER 80 MG: Performed by: OBSTETRICS & GYNECOLOGY

## 2020-11-17 PROCEDURE — 25010000002 KETOROLAC TROMETHAMINE PER 15 MG: Performed by: NURSE ANESTHETIST, CERTIFIED REGISTERED

## 2020-11-17 PROCEDURE — 88307 TISSUE EXAM BY PATHOLOGIST: CPT | Performed by: OBSTETRICS & GYNECOLOGY

## 2020-11-17 PROCEDURE — 94770: CPT

## 2020-11-17 PROCEDURE — 25010000002 MIDAZOLAM PER 1MG: Performed by: NURSE ANESTHETIST, CERTIFIED REGISTERED

## 2020-11-17 PROCEDURE — 25010000002 PROPOFOL 10 MG/ML EMULSION: Performed by: NURSE ANESTHETIST, CERTIFIED REGISTERED

## 2020-11-17 PROCEDURE — 25010000002 SUCCINYLCHOLINE PER 20 MG: Performed by: NURSE ANESTHETIST, CERTIFIED REGISTERED

## 2020-11-17 PROCEDURE — G0378 HOSPITAL OBSERVATION PER HR: HCPCS

## 2020-11-17 PROCEDURE — 25010000002 ROPIVACAINE PER 1 MG: Performed by: NURSE ANESTHETIST, CERTIFIED REGISTERED

## 2020-11-17 PROCEDURE — 25010000002 HYDROMORPHONE PER 4 MG: Performed by: NURSE ANESTHETIST, CERTIFIED REGISTERED

## 2020-11-17 PROCEDURE — 94799 UNLISTED PULMONARY SVC/PX: CPT

## 2020-11-17 PROCEDURE — 25010000002 NEOSTIGMINE 10 MG/10ML SOLUTION: Performed by: NURSE ANESTHETIST, CERTIFIED REGISTERED

## 2020-11-17 PROCEDURE — 25010000002 DIPHENHYDRAMINE PER 50 MG: Performed by: NURSE ANESTHETIST, CERTIFIED REGISTERED

## 2020-11-17 PROCEDURE — 58571 TLH W/T/O 250 G OR LESS: CPT | Performed by: OBSTETRICS & GYNECOLOGY

## 2020-11-17 PROCEDURE — 25010000002 DEXAMETHASONE PER 1 MG: Performed by: NURSE ANESTHETIST, CERTIFIED REGISTERED

## 2020-11-17 PROCEDURE — 58571 TLH W/T/O 250 G OR LESS: CPT | Performed by: SPECIALIST/TECHNOLOGIST, OTHER

## 2020-11-17 PROCEDURE — 25010000002 MAGNESIUM SULFATE 2 GM/50ML SOLUTION: Performed by: NURSE ANESTHETIST, CERTIFIED REGISTERED

## 2020-11-17 RX ORDER — SODIUM CHLORIDE, SODIUM LACTATE, POTASSIUM CHLORIDE, CALCIUM CHLORIDE 600; 310; 30; 20 MG/100ML; MG/100ML; MG/100ML; MG/100ML
9 INJECTION, SOLUTION INTRAVENOUS CONTINUOUS PRN
Status: DISCONTINUED | OUTPATIENT
Start: 2020-11-17 | End: 2020-11-18 | Stop reason: HOSPADM

## 2020-11-17 RX ORDER — SCOLOPAMINE TRANSDERMAL SYSTEM 1 MG/1
1 PATCH, EXTENDED RELEASE TRANSDERMAL CONTINUOUS
Status: DISPENSED | OUTPATIENT
Start: 2020-11-17 | End: 2020-11-18

## 2020-11-17 RX ORDER — BUPIVACAINE HYDROCHLORIDE 5 MG/ML
INJECTION, SOLUTION EPIDURAL; INTRACAUDAL AS NEEDED
Status: DISCONTINUED | OUTPATIENT
Start: 2020-11-17 | End: 2020-11-17 | Stop reason: HOSPADM

## 2020-11-17 RX ORDER — GABAPENTIN 300 MG/1
300 CAPSULE ORAL ONCE
Status: COMPLETED | OUTPATIENT
Start: 2020-11-17 | End: 2020-11-17

## 2020-11-17 RX ORDER — DIPHENHYDRAMINE HYDROCHLORIDE 50 MG/ML
12.5 INJECTION INTRAMUSCULAR; INTRAVENOUS ONCE
Status: COMPLETED | OUTPATIENT
Start: 2020-11-17 | End: 2020-11-17

## 2020-11-17 RX ORDER — IBUPROFEN 800 MG/1
800 TABLET ORAL 3 TIMES DAILY PRN
Status: DISCONTINUED | OUTPATIENT
Start: 2020-11-17 | End: 2020-11-18 | Stop reason: HOSPADM

## 2020-11-17 RX ORDER — MONTELUKAST SODIUM 10 MG/1
10 TABLET ORAL NIGHTLY
Status: DISCONTINUED | OUTPATIENT
Start: 2020-11-17 | End: 2020-11-18 | Stop reason: HOSPADM

## 2020-11-17 RX ORDER — HYDROMORPHONE HYDROCHLORIDE 1 MG/ML
1 INJECTION, SOLUTION INTRAMUSCULAR; INTRAVENOUS; SUBCUTANEOUS
Status: DISCONTINUED | OUTPATIENT
Start: 2020-11-17 | End: 2020-11-17 | Stop reason: HOSPADM

## 2020-11-17 RX ORDER — SODIUM CHLORIDE 0.9 % (FLUSH) 0.9 %
3 SYRINGE (ML) INJECTION EVERY 12 HOURS SCHEDULED
Status: DISCONTINUED | OUTPATIENT
Start: 2020-11-17 | End: 2020-11-17 | Stop reason: HOSPADM

## 2020-11-17 RX ORDER — GLYCOPYRROLATE 0.2 MG/ML
INJECTION INTRAMUSCULAR; INTRAVENOUS AS NEEDED
Status: DISCONTINUED | OUTPATIENT
Start: 2020-11-17 | End: 2020-11-17 | Stop reason: SURG

## 2020-11-17 RX ORDER — CLINDAMYCIN PHOSPHATE 900 MG/50ML
900 INJECTION INTRAVENOUS ONCE
Status: COMPLETED | OUTPATIENT
Start: 2020-11-17 | End: 2020-11-17

## 2020-11-17 RX ORDER — ONDANSETRON 2 MG/ML
4 INJECTION INTRAMUSCULAR; INTRAVENOUS ONCE AS NEEDED
Status: DISCONTINUED | OUTPATIENT
Start: 2020-11-17 | End: 2020-11-17 | Stop reason: HOSPADM

## 2020-11-17 RX ORDER — MAGNESIUM HYDROXIDE 1200 MG/15ML
LIQUID ORAL AS NEEDED
Status: DISCONTINUED | OUTPATIENT
Start: 2020-11-17 | End: 2020-11-17 | Stop reason: HOSPADM

## 2020-11-17 RX ORDER — SODIUM CHLORIDE 9 MG/ML
40 INJECTION, SOLUTION INTRAVENOUS AS NEEDED
Status: DISCONTINUED | OUTPATIENT
Start: 2020-11-17 | End: 2020-11-17 | Stop reason: HOSPADM

## 2020-11-17 RX ORDER — ONDANSETRON 4 MG/1
4 TABLET, FILM COATED ORAL EVERY 6 HOURS PRN
Status: DISCONTINUED | OUTPATIENT
Start: 2020-11-17 | End: 2020-11-18 | Stop reason: HOSPADM

## 2020-11-17 RX ORDER — OXYCODONE HYDROCHLORIDE AND ACETAMINOPHEN 5; 325 MG/1; MG/1
1 TABLET ORAL EVERY 4 HOURS PRN
Status: DISCONTINUED | OUTPATIENT
Start: 2020-11-17 | End: 2020-11-18 | Stop reason: HOSPADM

## 2020-11-17 RX ORDER — SODIUM CHLORIDE 0.9 % (FLUSH) 0.9 %
10 SYRINGE (ML) INJECTION AS NEEDED
Status: DISCONTINUED | OUTPATIENT
Start: 2020-11-17 | End: 2020-11-17 | Stop reason: HOSPADM

## 2020-11-17 RX ORDER — ROCURONIUM BROMIDE 10 MG/ML
INJECTION, SOLUTION INTRAVENOUS AS NEEDED
Status: DISCONTINUED | OUTPATIENT
Start: 2020-11-17 | End: 2020-11-17 | Stop reason: SURG

## 2020-11-17 RX ORDER — MIDAZOLAM HYDROCHLORIDE 2 MG/2ML
1 INJECTION, SOLUTION INTRAMUSCULAR; INTRAVENOUS
Status: COMPLETED | OUTPATIENT
Start: 2020-11-17 | End: 2020-11-17

## 2020-11-17 RX ORDER — SODIUM CHLORIDE 0.9 % (FLUSH) 0.9 %
1-10 SYRINGE (ML) INJECTION AS NEEDED
Status: DISCONTINUED | OUTPATIENT
Start: 2020-11-17 | End: 2020-11-17 | Stop reason: HOSPADM

## 2020-11-17 RX ORDER — ONDANSETRON 2 MG/ML
4 INJECTION INTRAMUSCULAR; INTRAVENOUS EVERY 6 HOURS PRN
Status: DISCONTINUED | OUTPATIENT
Start: 2020-11-17 | End: 2020-11-18 | Stop reason: HOSPADM

## 2020-11-17 RX ORDER — BUDESONIDE 0.5 MG/2ML
0.25 INHALANT ORAL
Status: DISCONTINUED | OUTPATIENT
Start: 2020-11-17 | End: 2020-11-17

## 2020-11-17 RX ORDER — MAGNESIUM SULFATE HEPTAHYDRATE 40 MG/ML
2 INJECTION, SOLUTION INTRAVENOUS ONCE
Status: COMPLETED | OUTPATIENT
Start: 2020-11-17 | End: 2020-11-17

## 2020-11-17 RX ORDER — PROPOFOL 10 MG/ML
VIAL (ML) INTRAVENOUS AS NEEDED
Status: DISCONTINUED | OUTPATIENT
Start: 2020-11-17 | End: 2020-11-17 | Stop reason: SURG

## 2020-11-17 RX ORDER — ACETAMINOPHEN 500 MG
1000 TABLET ORAL ONCE
Status: COMPLETED | OUTPATIENT
Start: 2020-11-17 | End: 2020-11-17

## 2020-11-17 RX ORDER — NEOSTIGMINE METHYLSULFATE 1 MG/ML
INJECTION, SOLUTION INTRAVENOUS AS NEEDED
Status: DISCONTINUED | OUTPATIENT
Start: 2020-11-17 | End: 2020-11-17 | Stop reason: SURG

## 2020-11-17 RX ORDER — DIPHENHYDRAMINE HYDROCHLORIDE 50 MG/ML
25 INJECTION INTRAMUSCULAR; INTRAVENOUS EVERY 6 HOURS PRN
Status: DISCONTINUED | OUTPATIENT
Start: 2020-11-17 | End: 2020-11-18 | Stop reason: HOSPADM

## 2020-11-17 RX ORDER — ALBUTEROL SULFATE 90 UG/1
2 AEROSOL, METERED RESPIRATORY (INHALATION) EVERY 4 HOURS PRN
Status: DISCONTINUED | OUTPATIENT
Start: 2020-11-17 | End: 2020-11-18 | Stop reason: HOSPADM

## 2020-11-17 RX ORDER — KETOROLAC TROMETHAMINE 30 MG/ML
INJECTION, SOLUTION INTRAMUSCULAR; INTRAVENOUS AS NEEDED
Status: DISCONTINUED | OUTPATIENT
Start: 2020-11-17 | End: 2020-11-17 | Stop reason: SURG

## 2020-11-17 RX ORDER — KETAMINE HYDROCHLORIDE 10 MG/ML
INJECTION INTRAMUSCULAR; INTRAVENOUS AS NEEDED
Status: DISCONTINUED | OUTPATIENT
Start: 2020-11-17 | End: 2020-11-17 | Stop reason: SURG

## 2020-11-17 RX ORDER — OXYCODONE AND ACETAMINOPHEN 10; 325 MG/1; MG/1
1 TABLET ORAL EVERY 4 HOURS PRN
Status: DISCONTINUED | OUTPATIENT
Start: 2020-11-17 | End: 2020-11-18 | Stop reason: HOSPADM

## 2020-11-17 RX ORDER — SODIUM CHLORIDE, SODIUM LACTATE, POTASSIUM CHLORIDE, CALCIUM CHLORIDE 600; 310; 30; 20 MG/100ML; MG/100ML; MG/100ML; MG/100ML
150 INJECTION, SOLUTION INTRAVENOUS CONTINUOUS
Status: DISCONTINUED | OUTPATIENT
Start: 2020-11-17 | End: 2020-11-18 | Stop reason: HOSPADM

## 2020-11-17 RX ORDER — LIDOCAINE HYDROCHLORIDE 10 MG/ML
0.5 INJECTION, SOLUTION EPIDURAL; INFILTRATION; INTRACAUDAL; PERINEURAL ONCE AS NEEDED
Status: DISCONTINUED | OUTPATIENT
Start: 2020-11-17 | End: 2020-11-17 | Stop reason: HOSPADM

## 2020-11-17 RX ORDER — SUCCINYLCHOLINE CHLORIDE 20 MG/ML
INJECTION INTRAMUSCULAR; INTRAVENOUS AS NEEDED
Status: DISCONTINUED | OUTPATIENT
Start: 2020-11-17 | End: 2020-11-17 | Stop reason: SURG

## 2020-11-17 RX ORDER — DEXAMETHASONE SODIUM PHOSPHATE 4 MG/ML
8 INJECTION, SOLUTION INTRA-ARTICULAR; INTRALESIONAL; INTRAMUSCULAR; INTRAVENOUS; SOFT TISSUE ONCE
Status: COMPLETED | OUTPATIENT
Start: 2020-11-17 | End: 2020-11-17

## 2020-11-17 RX ORDER — SODIUM CHLORIDE 0.9 % (FLUSH) 0.9 %
10 SYRINGE (ML) INJECTION EVERY 12 HOURS SCHEDULED
Status: DISCONTINUED | OUTPATIENT
Start: 2020-11-17 | End: 2020-11-17 | Stop reason: HOSPADM

## 2020-11-17 RX ORDER — NALOXONE HCL 0.4 MG/ML
0.1 VIAL (ML) INJECTION
Status: DISCONTINUED | OUTPATIENT
Start: 2020-11-17 | End: 2020-11-18 | Stop reason: HOSPADM

## 2020-11-17 RX ORDER — ROPIVACAINE HYDROCHLORIDE 2 MG/ML
INJECTION, SOLUTION EPIDURAL; INFILTRATION; PERINEURAL
Status: DISCONTINUED | OUTPATIENT
Start: 2020-11-17 | End: 2020-11-17 | Stop reason: SURG

## 2020-11-17 RX ORDER — PANTOPRAZOLE SODIUM 40 MG/1
40 TABLET, DELAYED RELEASE ORAL
Status: DISCONTINUED | OUTPATIENT
Start: 2020-11-17 | End: 2020-11-18 | Stop reason: HOSPADM

## 2020-11-17 RX ORDER — ONDANSETRON 2 MG/ML
4 INJECTION INTRAMUSCULAR; INTRAVENOUS ONCE AS NEEDED
Status: COMPLETED | OUTPATIENT
Start: 2020-11-17 | End: 2020-11-17

## 2020-11-17 RX ORDER — HYDROMORPHONE HYDROCHLORIDE 1 MG/ML
0.5 INJECTION, SOLUTION INTRAMUSCULAR; INTRAVENOUS; SUBCUTANEOUS
Status: DISCONTINUED | OUTPATIENT
Start: 2020-11-17 | End: 2020-11-17 | Stop reason: HOSPADM

## 2020-11-17 RX ORDER — FAMOTIDINE 10 MG/ML
20 INJECTION, SOLUTION INTRAVENOUS
Status: COMPLETED | OUTPATIENT
Start: 2020-11-17 | End: 2020-11-17

## 2020-11-17 RX ORDER — FENTANYL CITRATE 50 UG/ML
INJECTION, SOLUTION INTRAMUSCULAR; INTRAVENOUS AS NEEDED
Status: DISCONTINUED | OUTPATIENT
Start: 2020-11-17 | End: 2020-11-17 | Stop reason: SURG

## 2020-11-17 RX ADMIN — GLYCOPYRROLATE 0.4 MG: 0.2 INJECTION INTRAMUSCULAR; INTRAVENOUS at 11:31

## 2020-11-17 RX ADMIN — NEOSTIGMINE METHYLSULFATE 2 MG: 1 INJECTION INTRAVENOUS at 11:31

## 2020-11-17 RX ADMIN — HYDROMORPHONE HYDROCHLORIDE: 10 INJECTION, SOLUTION INTRAMUSCULAR; INTRAVENOUS; SUBCUTANEOUS at 14:05

## 2020-11-17 RX ADMIN — DILTIAZEM HYDROCHLORIDE 30 MG: 30 TABLET, FILM COATED ORAL at 20:09

## 2020-11-17 RX ADMIN — ROPIVACAINE HYDROCHLORIDE 80 ML: 2 INJECTION, SOLUTION EPIDURAL; INFILTRATION at 11:50

## 2020-11-17 RX ADMIN — KETAMINE HYDROCHLORIDE 30 MG: 10 INJECTION, SOLUTION INTRAMUSCULAR; INTRAVENOUS at 10:38

## 2020-11-17 RX ADMIN — MIDAZOLAM HYDROCHLORIDE 1 MG: 1 INJECTION, SOLUTION INTRAMUSCULAR; INTRAVENOUS at 09:08

## 2020-11-17 RX ADMIN — GENTAMICIN SULFATE 320 MG: 40 INJECTION, SOLUTION INTRAMUSCULAR; INTRAVENOUS at 08:54

## 2020-11-17 RX ADMIN — MONTELUKAST SODIUM 10 MG: 10 TABLET, FILM COATED ORAL at 20:09

## 2020-11-17 RX ADMIN — PROPOFOL 200 MG: 10 INJECTION, EMULSION INTRAVENOUS at 10:22

## 2020-11-17 RX ADMIN — MAGNESIUM SULFATE IN WATER 2 G: 40 INJECTION, SOLUTION INTRAVENOUS at 11:10

## 2020-11-17 RX ADMIN — CLINDAMYCIN IN 5 PERCENT DEXTROSE 900 MG: 18 INJECTION, SOLUTION INTRAVENOUS at 10:19

## 2020-11-17 RX ADMIN — ROCURONIUM BROMIDE 25 MG: 10 INJECTION, SOLUTION INTRAVENOUS at 10:37

## 2020-11-17 RX ADMIN — KETAMINE HYDROCHLORIDE 10 MG: 10 INJECTION, SOLUTION INTRAMUSCULAR; INTRAVENOUS at 11:15

## 2020-11-17 RX ADMIN — DIPHENHYDRAMINE HYDROCHLORIDE 12.5 MG: 50 INJECTION, SOLUTION INTRAMUSCULAR; INTRAVENOUS at 09:08

## 2020-11-17 RX ADMIN — SODIUM CHLORIDE, POTASSIUM CHLORIDE, SODIUM LACTATE AND CALCIUM CHLORIDE 9 ML/HR: 600; 310; 30; 20 INJECTION, SOLUTION INTRAVENOUS at 12:35

## 2020-11-17 RX ADMIN — SODIUM CHLORIDE, POTASSIUM CHLORIDE, SODIUM LACTATE AND CALCIUM CHLORIDE 9 ML/HR: 600; 310; 30; 20 INJECTION, SOLUTION INTRAVENOUS at 08:50

## 2020-11-17 RX ADMIN — SODIUM CHLORIDE, POTASSIUM CHLORIDE, SODIUM LACTATE AND CALCIUM CHLORIDE 150 ML/HR: 600; 310; 30; 20 INJECTION, SOLUTION INTRAVENOUS at 16:51

## 2020-11-17 RX ADMIN — ONDANSETRON 4 MG: 2 INJECTION, SOLUTION INTRAMUSCULAR; INTRAVENOUS at 09:08

## 2020-11-17 RX ADMIN — PANTOPRAZOLE SODIUM 40 MG: 40 TABLET, DELAYED RELEASE ORAL at 16:51

## 2020-11-17 RX ADMIN — ROCURONIUM BROMIDE 5 MG: 10 INJECTION, SOLUTION INTRAVENOUS at 10:21

## 2020-11-17 RX ADMIN — FENTANYL CITRATE 50 MCG: 50 INJECTION, SOLUTION INTRAMUSCULAR; INTRAVENOUS at 10:21

## 2020-11-17 RX ADMIN — KETOROLAC TROMETHAMINE 60 MG: 30 INJECTION INTRAMUSCULAR; INTRAVENOUS at 11:27

## 2020-11-17 RX ADMIN — SUCCINYLCHOLINE CHLORIDE 120 MG: 20 INJECTION, SOLUTION INTRAMUSCULAR; INTRAVENOUS at 10:23

## 2020-11-17 RX ADMIN — GABAPENTIN 300 MG: 300 CAPSULE ORAL at 09:06

## 2020-11-17 RX ADMIN — FENTANYL CITRATE 50 MCG: 50 INJECTION, SOLUTION INTRAMUSCULAR; INTRAVENOUS at 11:11

## 2020-11-17 RX ADMIN — HYDROMORPHONE HYDROCHLORIDE 0.5 MG: 1 INJECTION, SOLUTION INTRAMUSCULAR; INTRAVENOUS; SUBCUTANEOUS at 12:19

## 2020-11-17 RX ADMIN — DEXAMETHASONE SODIUM PHOSPHATE 8 MG: 4 INJECTION, SOLUTION INTRAMUSCULAR; INTRAVENOUS at 09:08

## 2020-11-17 RX ADMIN — MIDAZOLAM HYDROCHLORIDE 1 MG: 1 INJECTION, SOLUTION INTRAMUSCULAR; INTRAVENOUS at 09:44

## 2020-11-17 RX ADMIN — KETAMINE HYDROCHLORIDE 10 MG: 10 INJECTION, SOLUTION INTRAMUSCULAR; INTRAVENOUS at 11:02

## 2020-11-17 RX ADMIN — FAMOTIDINE 20 MG: 10 INJECTION INTRAVENOUS at 09:08

## 2020-11-17 RX ADMIN — ACETAMINOPHEN 1000 MG: 500 TABLET, FILM COATED ORAL at 09:08

## 2020-11-17 RX ADMIN — SCOPALAMINE 1 PATCH: 1 PATCH, EXTENDED RELEASE TRANSDERMAL at 09:10

## 2020-11-17 NOTE — ANESTHESIA PREPROCEDURE EVALUATION
Anesthesia Evaluation     Patient summary reviewed and Nursing notes reviewed   history of anesthetic complications: PONV  NPO Solid Status: > 8 hours  NPO Liquid Status: > 2 hours           Airway   Mallampati: II  TM distance: >3 FB  Neck ROM: full  No difficulty expected  Dental          Pulmonary     breath sounds clear to auscultation  (+) asthma (used inhalers this am),shortness of breath,   Cardiovascular   Exercise tolerance: good (4-7 METS)    Rhythm: regular  Rate: normal    (+) dysrhythmias (SVT) Tachycardia, hyperlipidemia,     ROS comment: Palpitations lov cardiology 7/2020   Premature atrial contractions  SVT (supraventricular tachycardia) (CMS/HCC) 14 day Zio Patch starting on 12/22/17: 33 brief runs of SVT (longest 20 beats in length at 153 bpm).      Neuro/Psych  (+) headaches, psychiatric history ( Panic attacks) Anxiety,     GI/Hepatic/Renal/Endo    (+) obesity,  GERD poorly controlled, PUD,      ROS Comment: IBS (irritable bowel syndrome)    Musculoskeletal     (+) back pain,       ROS comment: Scoliosis pain down legs and into hips   Abdominal   (+) obese,    Substance History   (+) alcohol use (1-2 glasses of wine per night ),      OB/GYN          Other   arthritis,      ROS/Med Hx Other: Finished CL 7am                  Anesthesia Plan    ASA 3     general and ITN     intravenous induction     Anesthetic plan, all risks, benefits, and alternatives have been provided, discussed and informed consent has been obtained with: patient.  Use of blood products discussed with patient  Consented to blood products.

## 2020-11-17 NOTE — PLAN OF CARE
Problem: Adult Inpatient Plan of Care  Goal: Plan of Care Review  Outcome: Ongoing, Progressing  Flowsheets (Taken 11/17/2020 1716)  Progress: improving  Plan of Care Reviewed With:   patient   spouse  Outcome Summary: vss, pain controlled with pca pump, urine output adequate, pt eating solid food, spouse at bedside.  Goal: Patient-Specific Goal (Individualized)  Outcome: Ongoing, Progressing  Goal: Absence of Hospital-Acquired Illness or Injury  Outcome: Ongoing, Progressing  Intervention: Identify and Manage Fall Risk  Recent Flowsheet Documentation  Taken 11/17/2020 1702 by Heike Cooper RN  Safety Promotion/Fall Prevention:   safety round/check completed   nonskid shoes/slippers when out of bed  Taken 11/17/2020 1532 by Heike Cooper RN  Safety Promotion/Fall Prevention: safety round/check completed  Taken 11/17/2020 1420 by Heike Cooper RN  Safety Promotion/Fall Prevention:   safety round/check completed   nonskid shoes/slippers when out of bed  Intervention: Prevent and Manage VTE (venous thromboembolism) Risk  Recent Flowsheet Documentation  Taken 11/17/2020 1532 by Heike Cooper RN  VTE Prevention/Management:   bilateral   sequential compression devices on  Taken 11/17/2020 1420 by Heike Cooper RN  VTE Prevention/Management:   bilateral   sequential compression devices on  Goal: Optimal Comfort and Wellbeing  Outcome: Ongoing, Progressing  Intervention: Provide Person-Centered Care  Recent Flowsheet Documentation  Taken 11/17/2020 1420 by Heike Cooper RN  Trust Relationship/Rapport:   care explained   choices provided   thoughts/feelings acknowledged   reassurance provided  Goal: Readiness for Transition of Care  Outcome: Ongoing, Progressing     Problem: Skin Injury Risk Increased  Goal: Skin Health and Integrity  Outcome: Ongoing, Progressing     Problem: Bleeding (Hysterectomy)  Goal: Absence of Bleeding  Outcome: Ongoing, Progressing     Problem: Bowel Elimination Impaired  (Hysterectomy)  Goal: Effective Bowel Elimination  Outcome: Ongoing, Progressing     Problem: Infection (Hysterectomy)  Goal: Absence of Infection Signs and Symptoms  Outcome: Ongoing, Progressing     Problem: Ongoing Anesthesia Effects (Hysterectomy)  Goal: Anesthesia/Sedation Recovery  Outcome: Ongoing, Progressing  Intervention: Optimize Anesthesia Recovery  Recent Flowsheet Documentation  Taken 11/17/2020 1702 by Heike Cooper RN  Safety Promotion/Fall Prevention:   safety round/check completed   nonskid shoes/slippers when out of bed  Taken 11/17/2020 1532 by Heike Cooper RN  Safety Promotion/Fall Prevention: safety round/check completed  Taken 11/17/2020 1420 by Heike Cooper RN  Safety Promotion/Fall Prevention:   safety round/check completed   nonskid shoes/slippers when out of bed     Problem: Pain (Hysterectomy)  Goal: Acceptable Pain Control  Outcome: Ongoing, Progressing  Intervention: Prevent or Manage Pain  Recent Flowsheet Documentation  Taken 11/17/2020 1702 by Heike Cooper RN  Pain Management Interventions: quiet environment facilitated     Problem: Postoperative Nausea and Vomiting (Hysterectomy)  Goal: Nausea and Vomiting Relief  Outcome: Ongoing, Progressing     Problem: Postoperative Urinary Retention (Hysterectomy)  Goal: Effective Urinary Elimination  Outcome: Ongoing, Progressing   Goal Outcome Evaluation:  Plan of Care Reviewed With: patient, spouse  Progress: improving  Outcome Summary: vss, pain controlled with pca pump, urine output adequate, pt eating solid food, spouse at bedside.

## 2020-11-17 NOTE — OP NOTE
Subjective     Date of Service:  11/17/20  Time of Service:  11:32 EST    Surgical Staff: Surgeon(s) and Role:     * Giana Paul MD - Primary   Additional Staff: Faustino Darby CFA   Pre-operative diagnosis(es): Pre-Op Diagnosis Codes:     * PMB (postmenopausal bleeding) [N95.0]     Post-operative diagnosis(es): Post-Op Diagnosis Codes:     * PMB (postmenopausal bleeding) [N95.0]   Procedure(s): Procedure(s):  TOTAL LAPAROSCOPIC HYSTERECTOMY with bilateral salpingoophorectomy     Antibiotics: clindamycin (Cleocin) and gentamycin (Garamycin)ordered on call to OR     Anesthesia: Type: Choice  ASA:  III     Objective      Operative findings:  Very small atrophic uterus  Normal ovaries  Normal appendix  Normal peritoneal surfaces.   Specimens removed: ID Type Source Tests Collected by Time   A (Not marked as sent) :  Tissue Uterus with Cervix, Bilateral Tubes and Ovaries TISSUE PATHOLOGY EXAM Giana Paul MD 11/17/2020 1107      Fluid Intake: 1,000mL   Output: Documented Output  Est. Blood Loss 25mL  Urine Output 100 mL, clear at the end of the procedure      No intake/output data recorded.     Blood products used: No   Drains: NG/OG Tube Orogastric 18 Fr Center mouth (Active)       Urethral Catheter Silicone 16 Fr. (Active)      Implant Information: Nothing was implanted during the procedure   Complications: None apparent   Intraoperative consult(s): none   Condition: stable   Disposition: to PACU and then [facility]         Assessment/Plan     After informed consent was obtained and the patient's pregnancy test was negative, she was taken to the operating room where general endotracheal anesthesia was administered without difficulty.  She was placed in yellowfin stirrups and prepped and draped in normal sterile fashion.  A Holley catheter was inserted.   An infra umbilical skin incision was made and the Veress needle was inserted at a 45 degree angle.  The water drop test was  positive.  Opening pressures were low and insufflation was begun until there was tympany in all 4 quadrants.  A 5 mm clear view trocar was then placed under direct visualization direct entry into the abdomen was confirmed with the laparoscope.  An 10 mm left lower quadrant and a 5 mm right lower quadrant port were then placed under direct visualization.  The patient was then placed in Trendelenburg.  The liver edge and appendix were both normal.  All peritoneal surfaces were normal. The Micheline uterine manipulator system was then placed according to 's instructions by the assistant.  The uterus is very small, so a controlled perforation of the fundus of the uterus was performed as the Micheline manipulator was placed.  Survey of the pelvis revealed very small atrophic uterus.  Both tubes and ovaries appear to be normal..   Both ureters were located and noted to be peristalsing normally.  The bowel was moved out of the pelvis.  The harmonic scalpel was then used to create a bilateral salpingectomy.  The round ligaments on both sides were then coagulated and cut with harmonic scalpel and the anterior leaf of the broad ligament was then taken down and the bladder was dissected down sharply past the level of the colpotomy cup.  The uterine ovarian arteries were then coagulated and cut.  The uterine arteries were then skeletonized and pretreated at the level of the cup with the bipolar cautery.  Blanching of the uterus consistent with bilateral ligation of the uterine arteries was noted.  The anterior colpotomy was then made with a active blade of the harmonic scalpel and taken around circumferentially until the uterus was detached.  The Micheline uterine manipulator system was then removed.  The uterus was then pulled out of the pelvis and sent for permanent section.  A Asepto bulb was then placed into the vagina to provide a continuing means of pneumoperitoneum.  The cuff was then closed from above with a series of 0  Vicryl sutures using extracorporeal knot tying.  The specimen was then removed from the vagina and sent off for permanent section.  The cuff was explored from below while watching from above.  Good closure was noted and no evidence of defect was appreciated.    The pelvis was then copiously irrigated and cleared of all clot and debris and all operative sites were hemostatic.  Both ureters were located noted to be peristalsing normally.  The procedure was deemed complete.  All instruments were removed under direct visualization and gas was allowed to escape the abdomen.  The fascia of the 10 mm port was closed with 2-0 Vicryl.  The incisions were closed with 4-0 Monocryl.  Sponge, lap, needle and instrument counts were all correct.  The patient was extubated without difficulty and taken to recovery room in good condition.  All counts were correct for the procedure.  The patient will be transferred to the [facility] center to be in observation overnight.    Giana Paul MD

## 2020-11-17 NOTE — ANESTHESIA PROCEDURE NOTES
Airway  Urgency: elective    Date/Time: 11/17/2020 10:25 AM    General Information and Staff    Patient location during procedure: OR    Indications and Patient Condition  Indications for airway management: airway protection    Preoxygenated: yes  MILS maintained throughout  Mask difficulty assessment: 1 - vent by mask    Final Airway Details  Final airway type: endotracheal airway      Successful airway: ETT  Cuffed: yes   Successful intubation technique: direct laryngoscopy  Facilitating devices/methods: intubating stylet  Endotracheal tube insertion site: oral  Blade: Kong  Blade size: 3  ETT size (mm): 7.5  Cormack-Lehane Classification: grade I - full view of glottis  Placement verified by: chest auscultation   Measured from: lips  ETT/EBT  to lips (cm): 21  Number of attempts at approach: 1  Assessment: lips, teeth, and gum same as pre-op and atraumatic intubation

## 2020-11-17 NOTE — ANESTHESIA PROCEDURE NOTES
Peripheral Block      Patient location during procedure: OR  Start time: 11/17/2020 11:42 AM  Stop time: 11/17/2020 11:58 AM  Reason for block: post-op pain management  Preanesthetic Checklist  Completed: patient identified, site marked, surgical consent, pre-op evaluation, timeout performed, IV checked, risks and benefits discussed and monitors and equipment checked  Prep:  Pt Position: supine  Sterile barriers:cap, gloves, mask and washed/disinfected hands  Prep: ChloraPrep  Patient monitoring: blood pressure monitoring, continuous pulse oximetry and EKG  Procedure  Sedation:yes  Performed under: general  Guidance:ultrasound guided  ULTRASOUND INTERPRETATION. Using ultrasound guidance a 22 G gauge needle was placed in close proximity to the nerve, at which point, under ultrasound guidance anesthetic was injected in the area of the nerve and spread of the anesthesia was seen on ultrasound in close proximity thereto.  There were no abnormalities seen on ultrasound; a digital image was taken; and the patient tolerated the procedure with no complications. Images:still images obtained, printed/placed on chart    Laterality:Bilateral  Block Type:TAP  Injection Technique:single-shot  Needle Type:echogenic and short-bevel  Needle Gauge:22 G  Resistance on Injection: none    Medications Used: ropivacaine (NAROPIN) 0.2% injection, 80 mL  Med admintered at 11/17/2020 11:50 AM      Post Assessment  Injection Assessment: negative aspiration for heme and incremental injection  Patient Tolerance:comfortable throughout block  Complications:no

## 2020-11-17 NOTE — ANESTHESIA POSTPROCEDURE EVALUATION
Patient: Rosalinda Boone    Procedure Summary     Date: 11/17/20 Room / Location: [facility] OR 4 /  [facility] OR    Anesthesia Start: 1018 Anesthesia Stop: 1205    Procedure: TOTAL LAPAROSCOPIC HYSTERECTOMY with bilateral salpingoophorectomy (N/A Abdomen) Diagnosis:       PMB (postmenopausal bleeding)      (PMB (postmenopausal bleeding) [N95.0])    Surgeon: Giana Paul MD Provider: Ken Lam CRNA    Anesthesia Type: general, ITN ASA Status: 3          Anesthesia Type: general, ITN    Vitals  Vitals Value Taken Time   BP 99/55 11/17/20 1245   Temp 96.8 °F (36 °C) 11/17/20 1206   Pulse 85 11/17/20 1248   Resp 12 11/17/20 1245   SpO2 93 % 11/17/20 1248   Vitals shown include unvalidated device data.        Post Anesthesia Care and Evaluation    Patient location during evaluation: PACU  Patient participation: complete - patient participated  Level of consciousness: awake  Pain management: adequate  Airway patency: patent  Anesthetic complications: No anesthetic complications  PONV Status: none  Cardiovascular status: acceptable  Respiratory status: acceptable  Hydration status: acceptable

## 2020-11-17 NOTE — INTERVAL H&P NOTE
"H&P reviewed. The patient was examined and there are no changes to the H&P.   /75 (BP Location: Right arm, Patient Position: Sitting)   Pulse 91   Temp 98.3 °F (36.8 °C) (Infrared)   Resp 19   Ht 160 cm (62.99\")   Wt 78 kg (172 lb)   LMP  (LMP Unknown)   SpO2 97%   BMI 30.48 kg/m²   Intrathecals were attempted were not successful due to scoliosis.  Patient will receive postop PCA.  Procedure reviewed.  All questions were answered.  Giana Paul MD    "

## 2020-11-18 VITALS
HEIGHT: 63 IN | TEMPERATURE: 97.7 F | RESPIRATION RATE: 18 BRPM | BODY MASS INDEX: 30.48 KG/M2 | DIASTOLIC BLOOD PRESSURE: 57 MMHG | OXYGEN SATURATION: 98 % | WEIGHT: 172 LBS | HEART RATE: 78 BPM | SYSTOLIC BLOOD PRESSURE: 116 MMHG

## 2020-11-18 LAB
DEPRECATED RDW RBC AUTO: 44.3 FL (ref 37–54)
ERYTHROCYTE [DISTWIDTH] IN BLOOD BY AUTOMATED COUNT: 12.4 % (ref 12.3–15.4)
HCT VFR BLD AUTO: 38 % (ref 34–46.6)
HGB BLD-MCNC: 12.3 G/DL (ref 12–15.9)
LAB AP CASE REPORT: NORMAL
LAB AP CLINICAL INFORMATION: NORMAL
MCH RBC QN AUTO: 31.5 PG (ref 26.6–33)
MCHC RBC AUTO-ENTMCNC: 32.4 G/DL (ref 31.5–35.7)
MCV RBC AUTO: 97.2 FL (ref 79–97)
PATH REPORT.FINAL DX SPEC: NORMAL
PATH REPORT.GROSS SPEC: NORMAL
PLATELET # BLD AUTO: 296 10*3/MM3 (ref 140–450)
PMV BLD AUTO: 9.7 FL (ref 6–12)
RBC # BLD AUTO: 3.91 10*6/MM3 (ref 3.77–5.28)
WBC # BLD AUTO: 11.89 10*3/MM3 (ref 3.4–10.8)

## 2020-11-18 PROCEDURE — 99024 POSTOP FOLLOW-UP VISIT: CPT | Performed by: NURSE PRACTITIONER

## 2020-11-18 PROCEDURE — 85027 COMPLETE CBC AUTOMATED: CPT | Performed by: OBSTETRICS & GYNECOLOGY

## 2020-11-18 PROCEDURE — G0378 HOSPITAL OBSERVATION PER HR: HCPCS

## 2020-11-18 RX ORDER — DOCUSATE SODIUM 100 MG/1
100 CAPSULE, LIQUID FILLED ORAL 2 TIMES DAILY
Qty: 60 CAPSULE | Refills: 1 | Status: SHIPPED | OUTPATIENT
Start: 2020-11-18 | End: 2021-01-06

## 2020-11-18 RX ORDER — IBUPROFEN 800 MG/1
800 TABLET ORAL 3 TIMES DAILY PRN
Qty: 30 TABLET | Refills: 0 | Status: SHIPPED | OUTPATIENT
Start: 2020-11-18 | End: 2020-12-09

## 2020-11-18 RX ORDER — OXYCODONE HYDROCHLORIDE AND ACETAMINOPHEN 5; 325 MG/1; MG/1
2 TABLET ORAL EVERY 4 HOURS PRN
Qty: 20 TABLET | Refills: 0 | Status: SHIPPED | OUTPATIENT
Start: 2020-11-18 | End: 2020-11-24

## 2020-11-18 RX ADMIN — IBUPROFEN 800 MG: 800 TABLET ORAL at 11:02

## 2020-11-18 RX ADMIN — DILTIAZEM HYDROCHLORIDE 30 MG: 30 TABLET, FILM COATED ORAL at 08:25

## 2020-11-18 RX ADMIN — PANTOPRAZOLE SODIUM 40 MG: 40 TABLET, DELAYED RELEASE ORAL at 06:02

## 2020-11-18 NOTE — PROGRESS NOTES
Patient: Rosalinda Boone  Procedure(s):  TOTAL LAPAROSCOPIC HYSTERECTOMY with bilateral salpingoophorectomy  Anesthesia type: general, ITN    Patient location: Labor and Delivery  Last vitals:   Vitals:    11/18/20 0741   BP: 116/57   Pulse: 78   Resp: 18   Temp: 97.7 °F (36.5 °C)   SpO2: 98%     Level of consciousness: awake, alert and oriented    Post-anesthesia pain: adequate analgesia  Airway patency: patent  Respiratory: unassisted  Cardiovascular: stable and blood pressure at baseline  Hydration: euvolemic    Anesthetic complications: no

## 2020-11-18 NOTE — DISCHARGE SUMMARY
Date of Admission: 11/17/2020  8:22 AM      Date of Discharge:  11/18/2020    Admitting Service: [facility]    Admission Diagnosis: Postmenopausal bleeding  Discharge Diagnosis: Postmenopausal bleeding    Presenting Problem/History of Present Illness  PMB (postmenopausal bleeding) [N95.0]  PMB (postmenopausal bleeding) [N95.0]       Hospital Course  Ms. Boone is a 59 yo est pt that underwent a TLH/BSO for persistent  Vaginal bleeding. She has progressed fairly well overnight. She is eating a regular diet. She has ambulated to the bathroom on several occasions. She plans to shower before she leaves. She reports flatus. She is voiding without difficulty. Her pain is well controlled. She used her PCA pump x 1 overnight and has been converted to oral pain medication. She reports she is ready for discharge.     Procedures Performed  Procedure(s):  TOTAL LAPAROSCOPIC HYSTERECTOMY with bilateral salpingoophorectomy       Consults:   Consults     No orders found for last 30 day(s).          Pertinent Test Results: labs: CBC    Condition on Discharge:  Satisfactory     Vital Signs  Temp:  [96.8 °F (36 °C)-98.2 °F (36.8 °C)] 97.7 °F (36.5 °C)  Heart Rate:  [] 78  Resp:  [12-20] 18  BP: ()/(41-80) 116/57    Physical Exam:   General Appearance: alert, pleasant, appears stated age and cooperative  Lungs: clear to auscultation, respirations regular, respirations even and respirations unlabored  Abdomen: soft, bowel sounds hypoactive. Dressing C/D/I x 3.   Extremities: moves extremities well, no edema, no tenderness and Shanell's sign negative  Skin: no bleeding, bruising or rash  Psych: normal    Discharge Disposition  Home or Self Care    Discharge Medications     Discharge Medications      New Medications      Instructions Start Date   docusate sodium 100 MG capsule  Commonly known as: Colace   100 mg, Oral, 2 Times Daily      ibuprofen 800 MG tablet  Commonly known as: ADVIL,MOTRIN   800 mg, Oral, 3 Times Daily PRN       oxyCODONE-acetaminophen 5-325 MG per tablet  Commonly known as: PERCOCET   2 tablets, Oral, Every 4 Hours PRN         Changes to Medications      Instructions Start Date   dilTIAZem 30 MG tablet  Commonly known as: CARDIZEM  What changed: when to take this   TAKE 1 TABLET TWICE A DAY         Continue These Medications      Instructions Start Date   Asmanex (30 Metered Doses) 110 MCG/INH inhaler  Generic drug: mometasone   1-2 puffs, Inhalation, Daily - RT, Can use twice a day if needed      azelastine 0.15 % solution nasal spray  Commonly known as: ASTEPRO   1 spray, Nasal, Daily PRN      Benefiber powder   1 packet, Oral, Daily      Biotin 43145 MCG tablet   1 tablet, Oral, Daily      Caltrate 600+D3 600-800 MG-UNIT tablet  Generic drug: calcium carb-cholecalciferol   1 tablet, Oral, Daily      clindamycin-benzoyl peroxide 1-5 % gel  Commonly known as: BENZACLIN   1 application, Topical, 2 Times Daily PRN      desloratadine 5 MG tablet  Commonly known as: CLARINEX   5 mg, Oral, Every Morning      estradiol 0.025 MG/24HR patch  Commonly known as: VIVELLE-DOT   1 patch, Transdermal, 2 Times Weekly      estradiol 0.1 MG/GM vaginal cream  Commonly known as: ESTRACE   1 g, Vaginal, 2 Times Weekly      FDgard 25-20.75 MG capsule  Generic drug: [place] Oil-Levomenthol   2 capsules, Oral, 3 Times Daily PRN      FLUTICASONE PROPIONATE (NASAL) NA   1 spray, Nasal, Daily PRN      GLUCOSAMINE CHONDR 1500 COMPLX PO   2 tablets, Oral, Daily      IBgard 90 MG capsule controlled-release  Generic drug: Peppermint Oil   2 capsules, Oral, 3 Times Daily PRN, IBgard       lansoprazole 30 MG capsule  Commonly known as: PREVACID   30 mg, Oral, 2 Times Daily      montelukast 10 MG tablet  Commonly known as: SINGULAIR   10 mg, Oral, Nightly      multivitamin tablet tablet  Commonly known as: THERAGRAN   2 tablets, Oral, Daily      ProAir  (90 Base) MCG/ACT inhaler  Generic drug: albuterol sulfate HFA   2 puffs, Inhalation, Every 4  Hours PRN      saccharomyces boulardii 250 MG capsule  Commonly known as: FLORASTOR   500 mg, Oral, Daily      Turmeric 500 MG tablet   500 mg, Oral, Daily      uribel 118 MG capsule capsule   1 capsule, Oral, 3 Times Daily PRN      Xiidra 5 % ophthalmic solution  Generic drug: Lifitegrast   1 drop, Both Eyes, 2 Times Daily         Stop These Medications    progesterone 100 MG capsule  Commonly known as: PROMETRIUM            Discharge Diet:   Diet Instructions     Diet: Regular; Thin      Discharge Diet: Regular    Fluid Consistency: Thin          Activity at Discharge:   Activity Instructions     Driving Restrictions      Type of Restriction: Driving    Driving Restrictions: No Driving (Time Limited)    Length: 2 Weeks    Lifting Restrictions      Type of Restriction: Lifting    Lifting Restrictions: Avoid Straining to Lift    Length of Lifting Restriction: Do not lift more than 5-10 #    Pelvic Rest      Sexual Activity Restrictions      Type of Restriction: Sex    Explain Sexual Activity Restrictions: Nothing in the vagina x 6 weeks          Follow-up Appointments  Future Appointments   Date Time Provider Department Center   12/2/2020 10:00 AM Giana Paul MD [facility] EP None   12/18/2020 11:30 AM Yaima Mejia APRN [facility] None   6/30/2021  9:15 AM Giana Paul MD [facility] EP None   7/15/2021  2:40 PM Moo Samuels MD [facility] None   8/9/2021  9:10 AM [facility]   8/23/2021  8:30 AM Shraddha Sosa MD [facility]AP[facility] [place]     Additional Instructions for the Follow-ups that You Need to Schedule     Discharge Follow-up with Specialty: [facility]; 2 Weeks   As directed      Specialty: [facility]    Follow Up: 2 Weeks    Follow Up Details: Postop check               Test Results Pending at Discharge  Pending Labs     Order Current Status    Tissue Pathology Exam In process           ANOOP Brody  11/18/20  10:17 EST    Time: Discharge 30  min

## 2020-11-19 NOTE — NURSING NOTE
Case Management Discharge Note      Final Note: dc home         Selected Continued Care - Discharged on 11/18/2020 Admission date: 11/17/2020 - Discharge disposition: Home or Self Care    Destination    No services have been selected for the patient.              Durable Medical Equipment    No services have been selected for the patient.              Dialysis/Infusion    No services have been selected for the patient.              Home Medical Care    No services have been selected for the patient.              Therapy    No services have been selected for the patient.              Community Resources    No services have been selected for the patient.                       Final Discharge Disposition Code: 01 - home or self-care

## 2020-11-19 NOTE — PROGRESS NOTES
I called and spoke with patient and she is doing well. Path B9. She is having some tingling in the bottom of her foot, probably from attempted intrathecal pain medicine.  This was attempted several times but was unable to be done due to her scoliosis.  She is having no weakness.  We discussed parameters of concern and she will call us back if the symptom worsens or fails to resolve.

## 2020-12-09 ENCOUNTER — OFFICE VISIT (OUTPATIENT)
Dept: OBSTETRICS AND GYNECOLOGY | Facility: CLINIC | Age: 60
End: 2020-12-09

## 2020-12-09 VITALS
BODY MASS INDEX: 30.55 KG/M2 | HEIGHT: 63 IN | WEIGHT: 172.4 LBS | DIASTOLIC BLOOD PRESSURE: 76 MMHG | SYSTOLIC BLOOD PRESSURE: 124 MMHG

## 2020-12-09 DIAGNOSIS — Z90.710 S/P LAPAROSCOPIC HYSTERECTOMY: ICD-10-CM

## 2020-12-09 DIAGNOSIS — Z13.9 SCREENING FOR CONDITION: Primary | ICD-10-CM

## 2020-12-09 DIAGNOSIS — Z09 POSTOPERATIVE FOLLOW-UP: ICD-10-CM

## 2020-12-09 LAB
BILIRUB BLD-MCNC: NEGATIVE MG/DL
CLARITY, POC: CLEAR
COLOR UR: YELLOW
GLUCOSE UR STRIP-MCNC: NEGATIVE MG/DL
KETONES UR QL: NEGATIVE
LEUKOCYTE EST, POC: NEGATIVE
NITRITE UR-MCNC: NEGATIVE MG/ML
PH UR: 5 [PH] (ref 5–8)
PROT UR STRIP-MCNC: NEGATIVE MG/DL
RBC # UR STRIP: ABNORMAL /UL
SP GR UR: 1 (ref 1–1.03)
UROBILINOGEN UR QL: NORMAL

## 2020-12-09 PROCEDURE — 81002 URINALYSIS NONAUTO W/O SCOPE: CPT | Performed by: OBSTETRICS & GYNECOLOGY

## 2020-12-09 PROCEDURE — 99024 POSTOP FOLLOW-UP VISIT: CPT | Performed by: OBSTETRICS & GYNECOLOGY

## 2020-12-09 NOTE — PROGRESS NOTES
"Surgical follow up visit     Rosalinda Boone is a 60 y.o. female who presents to the clinic 3 weeks status post TLH with BS  with Oophorectomy for  VB Eating a regular diet without difficulty. Bowel movements are normal. Pain is controlled without any medications..  Vaginal bleeding is none. She is doing well. She did not need any narcotics postop and has now mostly stopped ibuprofen as well. She is on her E2 patch and has stopped the progesterone and vaginal E2 until her healing is complete. She felt fatigued but says that is also improving daily. They were unable to do intrathecal narcs due to her scoliosis and we discussed that she should not have any back injections per anesthesia.    The following portions of the patient's history were reviewed and updated as appropriate: allergies, current medications, past family history, past medical history, past social history, past surgical history and problem list.    Review of Systems  Review of Systems   Constitutional: Positive for activity change and fatigue. Negative for appetite change, fever and unexpected weight change.   Eyes: Negative for photophobia and visual disturbance.   Respiratory: Negative for cough and shortness of breath.    Cardiovascular: Negative for chest pain and palpitations.   Gastrointestinal: Negative for abdominal distention, abdominal pain, constipation, diarrhea and nausea.   Endocrine: Negative for cold intolerance and heat intolerance.   Genitourinary: Negative for dyspareunia, dysuria, menstrual problem, pelvic pain, vaginal bleeding and vaginal discharge.   Musculoskeletal: Positive for back pain.   Skin: Negative for color change and rash.   Neurological: Negative for headaches.   Hematological: Negative for adenopathy. Does not bruise/bleed easily.   Psychiatric/Behavioral: Negative for dysphoric mood. The patient is not nervous/anxious.         Objective    /76   Ht 160 cm (62.99\")   Wt 78.2 kg (172 lb 6.4 oz)   LMP  (LMP " Unknown)   Breastfeeding No   BMI 30.55 kg/m²     Physical Exam  Vitals signs and nursing note reviewed.   Constitutional:       Appearance: Normal appearance. She is well-developed. She is obese.   HENT:      Head: Normocephalic and atraumatic.   Eyes:      General: No scleral icterus.     Conjunctiva/sclera: Conjunctivae normal.   Neck:      Musculoskeletal: Neck supple.      Thyroid: No thyromegaly.   Abdominal:      General: There is no distension.      Palpations: Abdomen is soft. There is no mass.      Tenderness: There is no abdominal tenderness. There is no guarding or rebound.      Hernia: No hernia is present.      Comments: Inc C/D/I, no erythema   Skin:     General: Skin is warm and dry.   Neurological:      Mental Status: She is alert and oriented to person, place, and time.   Psychiatric:         Mood and Affect: Mood normal.         Behavior: Behavior normal.         Thought Content: Thought content normal.         Judgment: Judgment normal.         Assessment     1) Post op TLH with BS  with Oophorectomy  Doing well postoperatively.  2) Operative findings again reviewed. Pathology report discussed, benign.  Intraoperative photos were reviewed.      Plan    1. Continue any current medications.  2. Wound care discussed.  3. Activity restrictions: desk job only, nothing in the vagina ( no tampons, douching or sex), no heavy lifting, pushing or pulling, no swimming, no use of hot tubs, no tub baths and no lifting more than 15 pounds  4. Anticipated return to work: plans to go back > 1/4/2021.  5. Follow up: 4 week(s) for final postop check .     Giana Paul MD     12/09/2020     10:38 EST

## 2021-01-06 ENCOUNTER — OFFICE VISIT (OUTPATIENT)
Dept: OBSTETRICS AND GYNECOLOGY | Facility: CLINIC | Age: 61
End: 2021-01-06

## 2021-01-06 VITALS
BODY MASS INDEX: 30.79 KG/M2 | WEIGHT: 173.8 LBS | SYSTOLIC BLOOD PRESSURE: 124 MMHG | HEIGHT: 63 IN | DIASTOLIC BLOOD PRESSURE: 74 MMHG

## 2021-01-06 DIAGNOSIS — Z79.890 HORMONE REPLACEMENT THERAPY (HRT): ICD-10-CM

## 2021-01-06 DIAGNOSIS — Z13.9 SCREENING FOR UNSPECIFIED CONDITION: Primary | ICD-10-CM

## 2021-01-06 DIAGNOSIS — Z90.710 STATUS POST LAPAROSCOPIC HYSTERECTOMY: ICD-10-CM

## 2021-01-06 LAB
BILIRUB BLD-MCNC: NEGATIVE MG/DL
CLARITY, POC: CLEAR
COLOR UR: YELLOW
GLUCOSE UR STRIP-MCNC: NEGATIVE MG/DL
KETONES UR QL: NEGATIVE
LEUKOCYTE EST, POC: NEGATIVE
NITRITE UR-MCNC: NEGATIVE MG/ML
PH UR: 5 [PH] (ref 5–8)
PROT UR STRIP-MCNC: NEGATIVE MG/DL
RBC # UR STRIP: NEGATIVE /UL
SP GR UR: 1 (ref 1–1.03)
UROBILINOGEN UR QL: NORMAL

## 2021-01-06 PROCEDURE — 99024 POSTOP FOLLOW-UP VISIT: CPT | Performed by: OBSTETRICS & GYNECOLOGY

## 2021-01-06 PROCEDURE — 81002 URINALYSIS NONAUTO W/O SCOPE: CPT | Performed by: OBSTETRICS & GYNECOLOGY

## 2021-01-06 NOTE — PROGRESS NOTES
" Surgical follow up visit     Rosalinda Boone is a 60 y.o. female who presents to the clinic 7 weeks status post TLH with BS  with Oophorectomy for persistent  Vb Eating a regular diet without difficulty. Bowel movements are normal. The patient is not having any pain..  Vaginal bleeding is none. She still has more fatigue than she expected. She has returned to work and is doing fine. She is still feeling some \"bruised\" feeling at the bottom of her foot from the attempted intrathecal narcs, but it is slowly getting better. She has had more palpitations and is seeing cardiology    The following portions of the patient's history were reviewed and updated as appropriate: allergies, current medications, past family history, past medical history, past social history, past surgical history and problem list.    Review of Systems  Review of Systems   Constitutional: Positive for activity change and fatigue.   Cardiovascular: Positive for palpitations.   Gastrointestinal: Negative for abdominal pain.   Genitourinary: Negative for vaginal bleeding, vaginal discharge and vaginal pain.   Musculoskeletal: Negative for back pain.   All other systems reviewed and are negative.       Objective    /74   Ht 160 cm (63\")   Wt 78.8 kg (173 lb 12.8 oz)   LMP  (LMP Unknown)   BMI 30.79 kg/m²     Physical Exam  Vitals signs and nursing note reviewed. Exam conducted with a chaperone present.   Constitutional:       Appearance: Normal appearance. She is well-developed.   HENT:      Head: Normocephalic and atraumatic.   Eyes:      General: No scleral icterus.     Conjunctiva/sclera: Conjunctivae normal.   Neck:      Musculoskeletal: Neck supple.      Thyroid: No thyromegaly.   Abdominal:      General: Bowel sounds are normal. There is no distension.      Palpations: Abdomen is soft. There is no mass.      Tenderness: There is no abdominal tenderness. There is no guarding or rebound.      Hernia: No hernia is present. "   Genitourinary:     General: Normal vulva.      Vagina: No signs of injury and foreign body. No vaginal discharge, erythema, tenderness, bleeding, lesions or prolapsed vaginal walls.      Uterus: Absent.       Adnexa: Right adnexa normal and left adnexa normal.      Comments: Normal cuff   no masses  Skin:     General: Skin is warm and dry.   Neurological:      Mental Status: She is alert and oriented to person, place, and time.   Psychiatric:         Mood and Affect: Mood normal.         Behavior: Behavior normal.         Thought Content: Thought content normal.         Judgment: Judgment normal.         Assessment     1) Post op TLH with BS  with Oophorectomy- Doing well postoperatively. Path B9  2) Operative findings again reviewed. Pathology report discussed.  Intraoperative photos were not reviewed.   3) Schedule MMG, enc compliance  4) HRT- pt on E2 patches, stopped progesterone. Restart estrace vaginal cream in 4 weeks.      Plan    1. Continue any current medications.  2. Wound care discussed.  3. Activity restrictions: nothing in the vagina ( no tampons, douching or sex) for 2 more weeks  4. Anticipated return to work: now.  5. Follow up: 1 year(s) for annual.     Giana Paul MD     01/06/2021     12:17 EST

## 2021-01-08 ENCOUNTER — OFFICE VISIT (OUTPATIENT)
Dept: GASTROENTEROLOGY | Facility: CLINIC | Age: 61
End: 2021-01-08

## 2021-01-08 VITALS — BODY MASS INDEX: 30.79 KG/M2 | TEMPERATURE: 97.4 F | WEIGHT: 173.8 LBS | HEIGHT: 63 IN

## 2021-01-08 DIAGNOSIS — K58.0 IRRITABLE BOWEL SYNDROME WITH DIARRHEA: ICD-10-CM

## 2021-01-08 DIAGNOSIS — K21.9 GASTROESOPHAGEAL REFLUX DISEASE WITHOUT ESOPHAGITIS: Primary | ICD-10-CM

## 2021-01-08 DIAGNOSIS — Z90.710 STATUS POST LAPAROSCOPIC HYSTERECTOMY: ICD-10-CM

## 2021-01-08 PROCEDURE — 99213 OFFICE O/P EST LOW 20 MIN: CPT | Performed by: NURSE PRACTITIONER

## 2021-01-08 NOTE — PROGRESS NOTES
Chief Complaint   Patient presents with   • Diarrhea   • Irritable Bowel Syndrome   • Heartburn       Rosalinda Boone is a  60 y.o. female here for a follow up visit for IBS.    HPI  60-year-old female presents today for follow-up visit for IBS-D and GERD.  She is a patient of Dr. Real.  She was last seen in the office by me on 9/18/2020.  She is so happy to report since her hysterectomy that her IBS symptoms are so much better.  She admits she is taking probiotics and this really seems to help.  She also has a history of GERD admits as long she takes her Prevacid 30 mg twice daily she does well.  She denies any breakthrough reflux at this point.  She denies any dysphagia, abdominal pain, nausea and vomiting, diarrhea, constipation, rectal bleeding or melena.  She is appetite is good and her weight is stable.  Last EGD and colonoscopy was on 12/5/2016.  Past Medical History:   Diagnosis Date   • Arthritis     HANDS, BACK , FEET, NECK   • Asthma    • Bladder spasm    • Gastritis    • GERD (gastroesophageal reflux disease)    • IBS (irritable bowel syndrome)    • Migraine    • Palpitations     lov cardiology 7/2020   • Panic attacks    • Panic attacks    • PONV (postoperative nausea and vomiting)    • Premature atrial contractions    • Right sided abdominal pain    • S/P D&C (status post dilation and curettage) 3/3/2020   • Scoliosis    • SOB (shortness of breath)    • Spotting     SCHEDULED FOR D&C   • Status post laparoscopic hysterectomy 11/17/2020   • SVT (supraventricular tachycardia) (CMS/HCC)     14 day Zio Patch starting on 12/22/17: 33 brief runs of SVT (longest 20 beats in length at 153 bpm).    • Tingling     in hands    • Urinary tract infection        Past Surgical History:   Procedure Laterality Date   • COLONOSCOPY N/A 12/5/2016    [facility],    • D&C HYSTEROSCOPY N/A 3/3/2020    Procedure: DILATATION AND CURETTAGE HYSTEROSCOPY, possible myosure;  Surgeon: Giana Paul MD;  Location: [facility] OR;   Service: Obstetrics/Gynecology;  Laterality: N/A;  DILATION AND CURETTAGE HYSTEROSCOPY   • D&C WITH SUCTION      for DUB   • ENDOSCOPY N/A 12/5/2016    z line regular, 40 cm from incisors,  granular gastric mucosa, chronic gastritis   • LASIK     • OOPHORECTOMY     • TOTAL LAPAROSCOPIC HYSTERECTOMY N/A 11/17/2020    Procedure: TOTAL LAPAROSCOPIC HYSTERECTOMY with bilateral salpingoophorectomy;  Surgeon: Giana Paul MD;  Location: [facility];  Service: Obstetrics/Gynecology;  Laterality: N/A;       Scheduled Meds:    Continuous Infusions:No current facility-administered medications for this visit.       PRN Meds:.    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   Tobacco Use   • Smoking status: Never Smoker   • Smokeless tobacco: Never Used   Substance and Sexual Activity   • Alcohol use: Yes     Alcohol/week: 14.0 standard drinks     Types: 14 Glasses of wine per week     Comment: 2 glasses/nightly with dinner   • Drug use: No   • Sexual activity: Not Currently     Partners: Male     Birth control/protection: Post-menopausal, Surgical     Comment: [facility] for recurrent  VB   Social History Narrative    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   HENT: Negative for nosebleeds, postnasal drip, sore throat, trouble swallowing and voice change.    Respiratory: Negative for cough, choking, chest tightness, shortness of breath and wheezing.    Cardiovascular: Negative for chest pain, palpitations and leg swelling.   Gastrointestinal: Negative for abdominal distention, abdominal pain, anal bleeding, blood in stool, constipation, diarrhea, nausea, rectal pain and vomiting.   Endocrine: Negative for polydipsia, polyphagia and polyuria.   Musculoskeletal: Negative for gait problem.   Skin: Negative for rash and wound.   Allergic/Immunologic: Negative for food allergies.   Neurological: Negative for dizziness, speech difficulty and light-headedness.   Psychiatric/Behavioral: Negative for confusion, self-injury, sleep disturbance and suicidal ideas.       Vitals:    01/08/21 1257   Temp: 97.4 °F (36.3 °C)       Physical Exam  Constitutional:       General: She is not in acute distress.     Appearance: She is well-developed. She is not ill-appearing.   HENT:      Head: Normocephalic.   Eyes:      Pupils: Pupils are equal, round, and reactive to light.   Cardiovascular:      Rate and Rhythm: Normal rate and regular rhythm.      Heart sounds: Normal heart sounds.   Pulmonary:      Effort: Pulmonary effort is normal.      Breath sounds: Normal breath sounds.   Abdominal:      General: Bowel sounds are normal. There is no distension.      Palpations: Abdomen is soft. There is no mass.      Tenderness: There is no abdominal tenderness. There is no guarding or rebound.      Hernia: No hernia is present.   Musculoskeletal: Normal range of motion.   Skin:      General: Skin is warm and dry.   Neurological:      Mental Status: She is alert and oriented to person, place, and time.   Psychiatric:         Speech: Speech normal.         Behavior: Behavior normal.         Judgment: Judgment normal.         No radiology results for the last 7 days     Assessment and plan     1. Gastroesophageal reflux disease without esophagitis    2. Irritable bowel syndrome with diarrhea    3. Status post laparoscopic hysterectomy    Overall think she is doing really well status post laparoscopic hysterectomy.  She just got cleared back to work by her surgeon.  GERD seems well controlled on Prevacid 30 mg twice daily.  Continue GERD precautions.  IBS-D/SIBO seems pretty stable right now on daily Florastor.  Patient to continue FD guard OTC as needed.  Next screening colonoscopy will be due 12/2026.  Patient to call the office with any issues.  Patient to follow-up with me in 3 months.

## 2021-02-12 ENCOUNTER — OFFICE VISIT (OUTPATIENT)
Dept: CARDIOLOGY | Facility: CLINIC | Age: 61
End: 2021-02-12

## 2021-02-12 VITALS
HEART RATE: 84 BPM | OXYGEN SATURATION: 99 % | SYSTOLIC BLOOD PRESSURE: 134 MMHG | HEIGHT: 63 IN | BODY MASS INDEX: 31.71 KG/M2 | WEIGHT: 179 LBS | DIASTOLIC BLOOD PRESSURE: 78 MMHG

## 2021-02-12 DIAGNOSIS — I47.1 SVT (SUPRAVENTRICULAR TACHYCARDIA) (HCC): ICD-10-CM

## 2021-02-12 DIAGNOSIS — R00.2 PALPITATIONS: Primary | ICD-10-CM

## 2021-02-12 DIAGNOSIS — I49.1 PREMATURE ATRIAL CONTRACTIONS: ICD-10-CM

## 2021-02-12 PROCEDURE — 99214 OFFICE O/P EST MOD 30 MIN: CPT | Performed by: NURSE PRACTITIONER

## 2021-02-12 NOTE — PROGRESS NOTES
"    CARDIOLOGY        Patient Name: Rosalinda Boone  :1960  Age: 60 y.o.  Primary Cardiologist: Aidan Samuels MD  Encounter Provider:  ANOOP Knowles    Date of Service: 21          CHIEF COMPLAINT / REASON FOR OFFICE VISIT     Palpitations      HISTORY OF PRESENT ILLNESS       HPI  Rosalinda Boone is a 60 y.o. female who presents today for SVT, PACs, heart palpitations.     Pt has a  history significant for palpitations, SVT.    Patient reports that since her hysterectomy in November her palpitations have worsened.  She states that in the past she has always had palpitations with pregnancy, menopause and now post hysterectomy.  She does suspect that these are hormonal related.  She states that she is now off of progesterone and now adjusting her estrogen dosing.  She states that the palpitations are pronounced, last seconds and improved with coughing and make her anxious.  She does report associated shortness of breath with palpitations.  Denies chest pain, lower extremity edema, increased fatigue.      The following portions of the patient's history were reviewed and updated as appropriate: allergies, current medications, past family history, past medical history, past social history, past surgical history and problem list.      VITAL SIGNS     Visit Vitals  /78 (BP Location: Left arm, Patient Position: Sitting, Cuff Size: Adult)   Pulse 84   Ht 160 cm (63\")   Wt 81.2 kg (179 lb)   LMP  (LMP Unknown)   SpO2 99%   BMI 31.71 kg/m²         Wt Readings from Last 3 Encounters:   21 81.2 kg (179 lb)   21 78.8 kg (173 lb 12.8 oz)   21 78.8 kg (173 lb 12.8 oz)     Body mass index is 31.71 kg/m².      REVIEW OF SYSTEMS   Review of Systems   Constitution: Positive for malaise/fatigue. Negative for chills, fever, weight gain and weight loss.   Cardiovascular: Negative for leg swelling.   Respiratory: Positive for cough, shortness of breath and snoring. Negative for wheezing.  "   Hematologic/Lymphatic: Negative for bleeding problem. Does not bruise/bleed easily.   Skin: Negative for color change.   Musculoskeletal: Positive for joint pain and joint swelling. Negative for falls and myalgias.   Gastrointestinal: Positive for abdominal pain. Negative for melena.   Genitourinary: Negative for hematuria.   Neurological: Negative for excessive daytime sleepiness.   Psychiatric/Behavioral: Negative for depression. The patient is nervous/anxious.            PHYSICAL EXAMINATION     Vitals signs and nursing note reviewed.   Constitutional:       Appearance: Normal appearance. Well-developed.   Eyes:      Conjunctiva/sclera: Conjunctivae normal.   Neck:      Vascular: No carotid bruit.   Pulmonary:      Breath sounds: Normal breath sounds.   Cardiovascular:      Normal rate. Regular rhythm. Normal S1 with normal intensity. Normal S2 with normal intensity.      Murmurs: There is no murmur.      No gallop. No click. No rub.   Edema:     Peripheral edema absent.   Musculoskeletal: Normal range of motion.   Skin:     General: Skin is warm and dry.   Neurological:      Mental Status: Alert and oriented to person, place, and time.      GCS: GCS eye subscore is 4. GCS verbal subscore is 5. GCS motor subscore is 6.   Psychiatric:         Speech: Speech normal.         Behavior: Behavior normal.         Thought Content: Thought content normal.         Judgment: Judgment normal.           REVIEWED DATA     Procedures    Cardiac Procedures:  1. 14 day Zio Patch starting on 12/22/17: 33 brief runs of SVT, with the longest being 20 beats in length at 153 bpm.  The fastest was 8 beats at 200 bpm.    2. Stress echocardiogram on 1/10/18: There was no evidence of ischemia.  The ejection fraction was 60-65%.  Diastolic function was normal.  The right ventricle was normal.  There was no significant valvular disease.  3. Event recorder on 6/20/2018: There were several very brief runs of paroxysmal SVT, ranging from 6-8  beats in length.  PACs occurred occasionally.  Most of the episodes of skipped beats correlated very brief runs of paroxysmal SVT.  Occasional palpitations correlated to isolated PACs.       Lipid Panel    Lipid Panel 8/5/20   Total Cholesterol 259 (A)   Triglycerides 172 (A)   HDL Cholesterol 69 (A)   VLDL Cholesterol 34.4   LDL Cholesterol  156 (A)   LDL/HDL Ratio 2.26   (A) Abnormal value                ASSESSMENT & PLAN      Diagnosis Plan   1. Palpitations  Holter Monitor - 24 Hour   2. Premature atrial contractions     3. SVT (supraventricular tachycardia) (CMS/[facility])           SUMMARY/DISCUSSION  1. Palpitations, PACs, SVT.  Patient reports that her palpitations have worsened since her hysterectomy.  She reports that in the past palpitations were always more pronounced based on her hormones.  She states that she has been adjusting her hormone dosing and is now on estrogen and off of progesterone.  She states that the palpitations are more pronounced lasting seconds that improve with coughing.  She reports that sometimes her heart beats really hard.  She states that she typically has had issues with palpitations during pregnancy, menopause and now post hysterectomy.  She states that the palpitations do make her anxious.  We will place a 24-hour monitor since she has not had one since 2018 and her symptoms are worsening.  I do suspect that these are hormonal related and she will continue to work with her gynecologist in regards to her hormone therapy.  2. Follow-up with Dr. Samuels in 6 months.  Sooner for problems or complications.        MEDICATIONS         Discharge Medications          Accurate as of February 12, 2021  1:09 PM. If you have any questions, ask your nurse or doctor.            Changes to Medications      Instructions Start Date   dilTIAZem 30 MG tablet  Commonly known as: CARDIZEM  What changed: when to take this   TAKE 1 TABLET TWICE A DAY         Continue These Medications      Instructions  Start Date   Asmanex (30 Metered Doses) 110 MCG/INH inhaler  Generic drug: mometasone   1-2 puffs, Inhalation, Daily - RT, Can use twice a day if needed      azelastine 0.15 % solution nasal spray  Commonly known as: ASTEPRO   1 spray, Nasal, Daily PRN      Benefiber powder   1 packet, Oral, Daily      Biotin 21073 MCG tablet   1 tablet, Oral, Daily      Caltrate 600+D3 600-800 MG-UNIT tablet  Generic drug: calcium carb-cholecalciferol   1 tablet, Oral, Daily      desloratadine 5 MG tablet  Commonly known as: CLARINEX   5 mg, Oral, Every Morning      estradiol 0.025 MG/24HR patch  Commonly known as: VIVELLE-DOT   1 patch, Transdermal, 2 Times Weekly      estradiol 0.1 MG/GM vaginal cream  Commonly known as: ESTRACE   1 g, Vaginal, 2 Times Weekly      FDgard 25-20.75 MG capsule  Generic drug: [place] Oil-Levomenthol   2 capsules, Oral, 3 Times Daily PRN      FLUTICASONE PROPIONATE (NASAL) NA   1 spray, Nasal, Daily PRN      GLUCOSAMINE CHONDR 1500 COMPLX PO   2 tablets, Oral, Daily      IBgard 90 MG capsule controlled-release  Generic drug: Peppermint Oil   2 capsules, Oral, 3 Times Daily PRN, IBgard       lansoprazole 30 MG capsule  Commonly known as: PREVACID   30 mg, Oral, 2 Times Daily      montelukast 10 MG tablet  Commonly known as: SINGULAIR   10 mg, Oral, Nightly      multivitamin tablet tablet  Commonly known as: THERAGRAN   2 tablets, Oral, Daily      ProAir  (90 Base) MCG/ACT inhaler  Generic drug: albuterol sulfate HFA   2 puffs, Inhalation, Every 4 Hours PRN      saccharomyces boulardii 250 MG capsule  Commonly known as: FLORASTOR   500 mg, Oral, Daily      Turmeric 500 MG tablet   500 mg, Oral, Daily      uribel 118 MG capsule capsule   1 capsule, Oral, 3 Times Daily PRN      Xiidra 5 % ophthalmic solution  Generic drug: Lifitegrast   1 drop, Both Eyes, 2 Times Daily         Stop These Medications    clindamycin-benzoyl peroxide 1-5 % gel  Commonly known as: BENZACLIN  Stopped by: Sandra MENCHACA  ANOOP Hunt                **Dragon Disclaimer:   Much of this encounter note is an electronic transcription/translation of spoken language to printed text. The electronic translation of spoken language may permit erroneous, or at times, nonsensical words or phrases to be inadvertently transcribed. Although I have reviewed the note for such errors, some may still exist.

## 2021-03-18 RX ORDER — ESTRADIOL 0.03 MG/D
1 FILM, EXTENDED RELEASE TRANSDERMAL 2 TIMES WEEKLY
Qty: 24 PATCH | Refills: 1 | Status: SHIPPED | OUTPATIENT
Start: 2021-03-18 | End: 2021-09-14

## 2021-03-19 ENCOUNTER — APPOINTMENT (OUTPATIENT)
Dept: WOMENS IMAGING | Facility: HOSPITAL | Age: 61
End: 2021-03-19

## 2021-03-19 ENCOUNTER — BULK ORDERING (OUTPATIENT)
Dept: CASE MANAGEMENT | Facility: OTHER | Age: 61
End: 2021-03-19

## 2021-03-19 DIAGNOSIS — Z23 IMMUNIZATION DUE: ICD-10-CM

## 2021-03-19 PROCEDURE — 77067 SCR MAMMO BI INCL CAD: CPT | Performed by: RADIOLOGY

## 2021-03-19 PROCEDURE — 77063 BREAST TOMOSYNTHESIS BI: CPT | Performed by: RADIOLOGY

## 2021-04-16 ENCOUNTER — OFFICE VISIT (OUTPATIENT)
Dept: GASTROENTEROLOGY | Facility: CLINIC | Age: 61
End: 2021-04-16

## 2021-04-16 VITALS — WEIGHT: 179.4 LBS | HEIGHT: 63 IN | BODY MASS INDEX: 31.79 KG/M2 | TEMPERATURE: 96 F

## 2021-04-16 DIAGNOSIS — R14.0 ABDOMINAL BLOATING: ICD-10-CM

## 2021-04-16 DIAGNOSIS — K58.0 IRRITABLE BOWEL SYNDROME WITH DIARRHEA: Primary | ICD-10-CM

## 2021-04-16 DIAGNOSIS — K21.9 GASTROESOPHAGEAL REFLUX DISEASE WITHOUT ESOPHAGITIS: ICD-10-CM

## 2021-04-16 PROCEDURE — 99214 OFFICE O/P EST MOD 30 MIN: CPT | Performed by: NURSE PRACTITIONER

## 2021-04-16 RX ORDER — LACTOBACILLUS RHAMNOSUS GG 10B CELL
CAPSULE ORAL
COMMUNITY
Start: 2021-02-06

## 2021-04-16 NOTE — PROGRESS NOTES
Chief Complaint   Patient presents with   • Heartburn   • Irritable Bowel Syndrome       Rosalinda Boone is a  61 y.o. female here for a follow up visit for IBS.    HPI  61-year-old female presents today for follow-up visit for IBS-D.  She is a patient of Dr. Real.  She was last seen in the office by me on 1/8/2021.  She has a history of IBS-D and had been doing much better since her laparoscopic hysterectomy this past November.  She tells me lately her bowels are still moving really well every day but she is having lots of gas and bloating.  She is not really sure if it is food induced or stress-induced or what.  She does switch up her probiotics and she is currently taking Culturelle.  Normally she tells me when these symptoms come on she will take IBgard or FD guard and the symptoms normally resolve.  However lately those medications are not working.  She does admit she eats a lot of ice cream but she has really tried to cut down her dairy and is even gone to a lactose-free ice cream so she does not really think it is dairy that is aggravating her.  She was having issues with diarrhea previously but admits her bowels are moving really well several times a day every day.  She does admit the gas and bloating does happen worse late in the day.  She also has a history of GERD and admits she does well on Prevacid 30 mg twice daily.  She denies any breakthrough reflux at this time.  She denies any dysphagia, reflux, nausea and vomiting, diarrhea, constipation, resulting or melena.  She admits her appetite is good and her weight is stable.  Her last EGD and colonoscopy was on 12/2016.  Her next screening colonoscopy will be due on 12/2026.  Past Medical History:   Diagnosis Date   • Arthritis     HANDS, BACK , FEET, NECK   • Asthma    • Bladder spasm    • Gastritis    • GERD (gastroesophageal reflux disease)    • IBS (irritable bowel syndrome)    • Migraine    • Palpitations     lov cardiology 7/2020   • Panic attacks     • Panic attacks    • PONV (postoperative nausea and vomiting)    • Premature atrial contractions    • Right sided abdominal pain    • S/P D&C (status post dilation and curettage) 3/3/2020   • Scoliosis    • SOB (shortness of breath)    • Spotting     SCHEDULED FOR D&C   • Status post laparoscopic hysterectomy 11/17/2020   • SVT (supraventricular tachycardia) (CMS/HCC)     14 day Zio Patch starting on 12/22/17: 33 brief runs of SVT (longest 20 beats in length at 153 bpm).    • Tingling     in hands    • Urinary tract infection        Past Surgical History:   Procedure Laterality Date   • COLONOSCOPY N/A 12/5/2016    [facility],    • D & C HYSTEROSCOPY N/A 3/3/2020    Procedure: DILATATION AND CURETTAGE HYSTEROSCOPY, possible myosure;  Surgeon: Giana Paul MD;  Location: [facility];  Service: Obstetrics/Gynecology;  Laterality: N/A;  DILATION AND CURETTAGE HYSTEROSCOPY   • D & C WITH SUCTION      for DUB   • ENDOSCOPY N/A 12/5/2016    z line regular, 40 cm from incisors,  granular gastric mucosa, chronic gastritis   • LASIK     • OOPHORECTOMY     • TOTAL LAPAROSCOPIC HYSTERECTOMY N/A 11/17/2020    Procedure: TOTAL LAPAROSCOPIC HYSTERECTOMY with bilateral salpingoophorectomy;  Surgeon: Giana Paul MD;  Location: [facility];  Service: Obstetrics/Gynecology;  Laterality: N/A;       Scheduled Meds:    Continuous Infusions:No current facility-administered medications for this visit.      PRN Meds:.    Allergies   Allergen Reactions   • Codeine Dizziness   • Levaquin [Levofloxacin] GI Intolerance     FACIAL NUMBNESS   • Qvar [Beclomethasone] GI Intolerance     Tingling on one side of face   • Sulfa Antibiotics Hives   • Erythromycin Diarrhea   • Keflex [Cephalexin] Rash       Social History     Socioeconomic History   • Marital status:      Spouse name: MATTIE ALMARAZ   • Number of children: 2   • Years of education: Not on file   • Highest education level: Not on file   Tobacco Use   • Smoking  status: Never Smoker   • Smokeless tobacco: Never Used   Vaping Use   • Vaping Use: Never used   Substance and Sexual Activity   • Alcohol use: Yes     Alcohol/week: 14.0 standard drinks     Types: 14 Glasses of wine per week     Comment: 2 glasses/nightly with dinner   • Drug use: No   • Sexual activity: Not Currently     Partners: Male     Birth control/protection: Post-menopausal, Surgical     Comment: [facility] BSO for recurrent  VB       Family History   Problem Relation Age of Onset   • Cancer Father 63        lung-smoker   • Aneurysm Mother 73        AAA   • Scleroderma Maternal Aunt    • Leukemia Maternal Grandmother    • Aneurysm Maternal Grandfather         Cerebral   • Breast cancer Neg Hx    • Ovarian cancer Neg Hx    • Colon cancer Neg Hx    • Deep vein thrombosis Neg Hx    • Pulmonary embolism Neg Hx    • Malig Hyperthermia Neg Hx        Review of Systems   Constitutional: Negative for appetite change, chills, diaphoresis, fatigue, fever and unexpected weight change.   HENT: Negative for nosebleeds, postnasal drip, sore throat, trouble swallowing and voice change.    Respiratory: Negative for cough, choking, chest tightness, shortness of breath and wheezing.    Cardiovascular: Negative for chest pain, palpitations and leg swelling.   Gastrointestinal: Positive for abdominal distention. Negative for abdominal pain, anal bleeding, blood in stool, constipation, diarrhea, nausea, rectal pain and vomiting.   Endocrine: Negative for polydipsia, polyphagia and polyuria.   Musculoskeletal: Negative for gait problem.   Skin: Negative for rash and wound.   Allergic/Immunologic: Negative for food allergies.   Neurological: Negative for dizziness, speech difficulty and light-headedness.   Psychiatric/Behavioral: Negative for confusion, self-injury, sleep disturbance and suicidal ideas.       Vitals:    04/16/21 1308   Temp: 96 °F (35.6 °C)       Physical Exam  Constitutional:       General: She is not in acute  distress.     Appearance: She is well-developed. She is not ill-appearing.   HENT:      Head: Normocephalic.   Eyes:      Pupils: Pupils are equal, round, and reactive to light.   Cardiovascular:      Rate and Rhythm: Normal rate and regular rhythm.      Heart sounds: Normal heart sounds.   Pulmonary:      Effort: Pulmonary effort is normal.      Breath sounds: Normal breath sounds.   Abdominal:      General: Bowel sounds are normal. There is distension.      Palpations: Abdomen is soft. There is no mass.      Tenderness: There is no abdominal tenderness. There is no guarding or rebound.      Hernia: No hernia is present.   Musculoskeletal:         General: Normal range of motion.   Skin:     General: Skin is warm and dry.   Neurological:      Mental Status: She is alert and oriented to person, place, and time.   Psychiatric:         Speech: Speech normal.         Behavior: Behavior normal.         Judgment: Judgment normal.         No radiology results for the last 7 days     Assessment and plan    1. Irritable bowel syndrome with diarrhea    2. Abdominal bloating    3. Gastroesophageal reflux disease without esophagitis    IBS-D seems much better with her actually now having no issues with diarrhea.  Her bowels are moving well every day.  I am not sure at this time what is causing the gas and bloating.  It could be hormonal since she is status post hysterectomy.  I do think it would benefit her to keep a food journal so that we can, see what is going on later in the day that is causing this gas and bloating.  It could be food triggered.  For now she can try some Gas-X over-the-counter as needed.  Continue IBgard and FD guard OTC as needed.  GERD seems well controlled on Prevacid 30 mg twice daily.  Continue GERD precautions.  Patient to call the office next week with an update.  Patient to follow-up with me in 1 month.  Patient is agreeable to the plan.  Next screening colonoscopy will be due on 12/2026.

## 2021-05-18 DIAGNOSIS — R00.2 PALPITATIONS: Primary | ICD-10-CM

## 2021-05-18 DIAGNOSIS — I49.1 PREMATURE ATRIAL CONTRACTIONS: ICD-10-CM
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
## 2021-07-02 ENCOUNTER — TELEPHONE (OUTPATIENT)
Dept: CARDIOLOGY | Facility: CLINIC | Age: 61
End: 2021-07-02

## 2021-07-02 NOTE — TELEPHONE ENCOUNTER
Pt left msg saying she would like zio results prior to leaving on vacation July 11 th.  She will be flying and wanted to verify that flying/traveling is OK .    Please call her @ [phone]    Thanks,  Suzie

## 2021-07-08 NOTE — TELEPHONE ENCOUNTER
Antonella,    This Zio was placed back in May and we do not have results.  Can you please look into it?    Thanks,  Shelbi

## 2021-07-27 DIAGNOSIS — K21.9 GASTROESOPHAGEAL REFLUX DISEASE: ICD-10-CM

## 2021-07-27 DIAGNOSIS — K58.0 IRRITABLE BOWEL SYNDROME WITH DIARRHEA: ICD-10-CM

## 2021-07-27 DIAGNOSIS — Z00.00 HEALTH CARE MAINTENANCE: ICD-10-CM

## 2021-07-29 LAB
ALBUMIN SERPL-MCNC: 4.1 G/DL (ref 3.5–5.2)
ALBUMIN/GLOB SERPL: 1.8 G/DL
ALP SERPL-CCNC: 63 U/L (ref 39–117)
ALT SERPL-CCNC: 11 U/L (ref 1–33)
AST SERPL-CCNC: 14 U/L (ref 1–32)
BASOPHILS # BLD AUTO: 0.06 10*3/MM3 (ref 0–0.2)
BASOPHILS NFR BLD AUTO: 0.8 % (ref 0–1.5)
BILIRUB SERPL-MCNC: 0.3 MG/DL (ref 0–1.2)
BUN SERPL-MCNC: 12 MG/DL (ref 8–23)
BUN/CREAT SERPL: 16 (ref 7–25)
CALCIUM SERPL-MCNC: 8.9 MG/DL (ref 8.6–10.5)
CHLORIDE SERPL-SCNC: 102 MMOL/L (ref 98–107)
CHOLEST SERPL-MCNC: 234 MG/DL (ref 0–200)
CO2 SERPL-SCNC: 26.5 MMOL/L (ref 22–29)
CREAT SERPL-MCNC: 0.75 MG/DL (ref 0.57–1)
EOSINOPHIL # BLD AUTO: 0.09 10*3/MM3 (ref 0–0.4)
EOSINOPHIL NFR BLD AUTO: 1.2 % (ref 0.3–6.2)
ERYTHROCYTE [DISTWIDTH] IN BLOOD BY AUTOMATED COUNT: 11.9 % (ref 12.3–15.4)
GLOBULIN SER CALC-MCNC: 2.3 GM/DL
GLUCOSE SERPL-MCNC: 94 MG/DL (ref 65–99)
HCT VFR BLD AUTO: 41.1 % (ref 34–46.6)
HDLC SERPL-MCNC: 68 MG/DL (ref 40–60)
HGB BLD-MCNC: 14.2 G/DL (ref 12–15.9)
IMM GRANULOCYTES # BLD AUTO: 0.01 10*3/MM3 (ref 0–0.05)
IMM GRANULOCYTES NFR BLD AUTO: 0.1 % (ref 0–0.5)
LDLC SERPL CALC-MCNC: 138 MG/DL (ref 0–100)
LDLC/HDLC SERPL: 1.97 {RATIO}
LYMPHOCYTES # BLD AUTO: 1.94 10*3/MM3 (ref 0.7–3.1)
LYMPHOCYTES NFR BLD AUTO: 25.2 % (ref 19.6–45.3)
MCH RBC QN AUTO: 32.1 PG (ref 26.6–33)
MCHC RBC AUTO-ENTMCNC: 34.5 G/DL (ref 31.5–35.7)
MCV RBC AUTO: 93 FL (ref 79–97)
MONOCYTES # BLD AUTO: 0.55 10*3/MM3 (ref 0.1–0.9)
MONOCYTES NFR BLD AUTO: 7.1 % (ref 5–12)
NEUTROPHILS # BLD AUTO: 5.05 10*3/MM3 (ref 1.7–7)
NEUTROPHILS NFR BLD AUTO: 65.6 % (ref 42.7–76)
NRBC BLD AUTO-RTO: 0 /100 WBC (ref 0–0.2)
PLATELET # BLD AUTO: 370 10*3/MM3 (ref 140–450)
POTASSIUM SERPL-SCNC: 4.3 MMOL/L (ref 3.5–5.2)
PROT SERPL-MCNC: 6.4 G/DL (ref 6–8.5)
RBC # BLD AUTO: 4.42 10*6/MM3 (ref 3.77–5.28)
SODIUM SERPL-SCNC: 138 MMOL/L (ref 136–145)
TRIGL SERPL-MCNC: 161 MG/DL (ref 0–150)
TSH SERPL DL<=0.005 MIU/L-ACNC: 1.88 UIU/ML (ref 0.27–4.2)
VLDLC SERPL CALC-MCNC: 28 MG/DL (ref 5–40)
WBC # BLD AUTO: 7.7 10*3/MM3 (ref 3.4–10.8)

## 2021-08-04 ENCOUNTER — OFFICE VISIT (OUTPATIENT)
Dept: CARDIOLOGY | Facility: CLINIC | Age: 61
End: 2021-08-04

## 2021-08-04 VITALS
DIASTOLIC BLOOD PRESSURE: 78 MMHG | BODY MASS INDEX: 30.65 KG/M2 | OXYGEN SATURATION: 99 % | WEIGHT: 173 LBS | HEART RATE: 83 BPM | HEIGHT: 63 IN | SYSTOLIC BLOOD PRESSURE: 120 MMHG

## 2021-08-04 DIAGNOSIS — I47.1 SVT (SUPRAVENTRICULAR TACHYCARDIA) (HCC): ICD-10-CM

## 2021-08-04 DIAGNOSIS — R00.2 PALPITATIONS: Primary | ICD-10-CM

## 2021-08-04 DIAGNOSIS — I49.1 PREMATURE ATRIAL CONTRACTIONS: ICD-10-CM

## 2021-08-04 PROCEDURE — 99213 OFFICE O/P EST LOW 20 MIN: CPT | Performed by: NURSE PRACTITIONER

## 2021-08-04 RX ORDER — DILTIAZEM HYDROCHLORIDE 60 MG/1
60 TABLET, FILM COATED ORAL 2 TIMES DAILY
Qty: 180 TABLET | Refills: 3 | Status: SHIPPED | OUTPATIENT
Start: 2021-08-04 | End: 2021-08-05 | Stop reason: SDUPTHER

## 2021-08-04 NOTE — PROGRESS NOTES
"    CARDIOLOGY        Patient Name: Rosalinda Boone  :1960  Age: 61 y.o.  Primary Cardiologist: Aidan Samuels MD  Encounter Provider:  ANOOP Knowles    Date of Service: 21          CHIEF COMPLAINT / REASON FOR OFFICE VISIT     Palpitations (1 yr f/u )      HISTORY OF PRESENT ILLNESS       HPI  Rosalinda Boone is a 61 y.o. female who presents today for annual evaluation.     Pt has a  history significant for palpitations, SVT.    Patient states that since increasing diltiazem she has not necessarily noticed any improvement of her palpitations.  She states that typically her palpitations are more described as a hard.  She states that when she does have heart palpitations she experiences chest tightness and dyspnea.  She states that she has began a walking regimen and does not have any limiting symptoms.  Denies any symptoms except for what is associated with her heart palpitations.      The following portions of the patient's history were reviewed and updated as appropriate: allergies, current medications, past family history, past medical history, past social history, past surgical history and problem list.      VITAL SIGNS     Visit Vitals  /78 (BP Location: Right arm, Patient Position: Sitting, Cuff Size: Adult)   Pulse 83   Ht 160 cm (63\")   Wt 78.5 kg (173 lb)   LMP  (LMP Unknown)   SpO2 99%   BMI 30.65 kg/m²         Wt Readings from Last 3 Encounters:   21 78.5 kg (173 lb)   21 81.4 kg (179 lb 6.4 oz)   21 81.2 kg (179 lb)     Body mass index is 30.65 kg/m².      REVIEW OF SYSTEMS   Review of Systems   Constitutional: Negative for chills, fever, weight gain and weight loss.   Cardiovascular: Positive for palpitations. Negative for leg swelling.   Respiratory: Negative for cough, snoring and wheezing.    Hematologic/Lymphatic: Negative for bleeding problem. Does not bruise/bleed easily.   Skin: Negative for color change.   Musculoskeletal: Negative for falls, joint " pain and myalgias.   Gastrointestinal: Negative for melena.   Genitourinary: Negative for hematuria.   Neurological: Negative for excessive daytime sleepiness.   Psychiatric/Behavioral: Negative for depression. The patient is not nervous/anxious.            PHYSICAL EXAMINATION     Vitals and nursing note reviewed.   Constitutional:       Appearance: Normal appearance. Well-developed.   Eyes:      Conjunctiva/sclera: Conjunctivae normal.   Neck:      Vascular: No carotid bruit.   Pulmonary:      Breath sounds: Normal breath sounds.   Cardiovascular:      Normal rate. Regular rhythm. Normal S1 with normal intensity. Normal S2 with normal intensity.      Murmurs: There is no murmur.      No gallop. No click. No rub.   Edema:     Peripheral edema absent.   Musculoskeletal: Normal range of motion. Skin:     General: Skin is warm and dry.   Neurological:      Mental Status: Alert and oriented to person, place, and time.      GCS: GCS eye subscore is 4. GCS verbal subscore is 5. GCS motor subscore is 6.   Psychiatric:         Speech: Speech normal.         Behavior: Behavior normal.         Thought Content: Thought content normal.         Judgment: Judgment normal.           REVIEWED DATA     Procedures    Cardiac Procedures:  1. 14 day Zio Patch starting on 12/22/17: 33 brief runs of SVT, with the longest being 20 beats in length at 153 bpm.  The fastest was 8 beats at 200 bpm.    2. Stress echocardiogram on 1/10/18: There was no evidence of ischemia.  The ejection fraction was 60-65%.  Diastolic function was normal.  The right ventricle was normal.  There was no significant valvular disease.  3. Event recorder on 6/20/2018: There were several very brief runs of paroxysmal SVT, ranging from 6-8 beats in length.  PACs occurred occasionally.  Most of the episodes of skipped beats correlated very brief runs of paroxysmal SVT.  Occasional palpitations correlated to isolated PACs.  4. ZIO monitor 7/15/2021.  61 very brief  runs of SVT.  Fastest lasting 6 beats in length, most episodes were less than 10 beats in length.  There was one episode of atrial tachycardia which lasted 40 seconds with average heart rate 119.  No evidence of atrial fibrillation.    Lipid Panel    Lipid Panel 7/28/21   Total Cholesterol 234 (A)   Triglycerides 161 (A)   HDL Cholesterol 68 (A)   VLDL Cholesterol 28   LDL Cholesterol  138 (A)   LDL/HDL Ratio 1.97   (A) Abnormal value       Comments are available for some flowsheets but are not being displayed.               ASSESSMENT & PLAN      Diagnosis Plan   1. Palpitations     2. Premature atrial contractions     3. SVT (supraventricular tachycardia) (CMS/[facility])           SUMMARY/DISCUSSION  1. Heart palpitations.  Patient does note frequent but brief episodes of heart palpitations with associated chest tightness and dyspnea.  ZIO monitor revealed PACs and very brief episodes of SVT lasting less than 10 beats.  Patient describes the sensations as heartbeats.  I explained to the pathophysiology of PACs and the subsequent beats that follow to make patient more aware of the sensation of what a heartbeat means.  She states that when these occur she does get anxious which tends to exacerbate them more.  At this point she will continue her current dose of diltiazem 60 mg twice per day.  Refill was provided in the office.  2. Patient will follow up with Dr. Samuels in 6 months.  Sooner for any problems or complications.        MEDICATIONS         Discharge Medications          Accurate as of August 4, 2021  9:12 AM. If you have any questions, ask your nurse or doctor.            Continue These Medications      Instructions Start Date   Asmanex (30 Metered Doses) 110 MCG/INH inhaler  Generic drug: mometasone   1-2 puffs, Inhalation, Daily - RT, Can use twice a day if needed      azelastine 0.15 % solution nasal spray  Commonly known as: ASTEPRO   1 spray, Nasal, Daily PRN      Benefiber powder   1 packet, Oral,  Daily      Biotin 55849 MCG tablet   1 tablet, Oral, Daily      Caltrate 600+D3 600-800 MG-UNIT tablet  Generic drug: calcium carb-cholecalciferol   1 tablet, Oral, Daily      Culturelle Digestive Health capsule   No dose, route, or frequency recorded.      desloratadine 5 MG tablet  Commonly known as: CLARINEX   5 mg, Oral, Every Morning      dilTIAZem 30 MG tablet  Commonly known as: CARDIZEM   TAKE 1 TABLET TWICE A DAY      estradiol 0.025 MG/24HR patch  Commonly known as: VIVELLE-DOT   1 patch, Transdermal, 2 Times Weekly      estradiol 0.1 MG/GM vaginal cream  Commonly known as: ESTRACE   1 g, Vaginal, 2 Times Weekly      FDgard 25-20.75 MG capsule  Generic drug: Estelita Oil-Levomenthol   2 capsules, Oral, 3 Times Daily PRN      FLUTICASONE PROPIONATE (NASAL) NA   1 spray, Nasal, Daily PRN      GLUCOSAMINE CHONDR 1500 COMPLX PO   2 tablets, Oral, Daily      IBgard 90 MG capsule controlled-release  Generic drug: Peppermint Oil   2 capsules, Oral, 3 Times Daily PRN, IBgard       lansoprazole 30 MG capsule  Commonly known as: PREVACID   30 mg, Oral, 2 Times Daily      montelukast 10 MG tablet  Commonly known as: SINGULAIR   10 mg, Oral, Nightly      multivitamin tablet tablet  Commonly known as: THERAGRAN   2 tablets, Oral, Daily      ProAir  (90 Base) MCG/ACT inhaler  Generic drug: albuterol sulfate HFA   2 puffs, Inhalation, Every 4 Hours PRN      Turmeric 500 MG tablet   500 mg, Oral, Daily      uribel 118 MG capsule capsule   1 capsule, Oral, 3 Times Daily PRN      Xiidra 5 % ophthalmic solution  Generic drug: Lifitegrast   1 drop, Both Eyes, 2 Times Daily                 **Dragon Disclaimer:   Much of this encounter note is an electronic transcription/translation of spoken language to printed text. The electronic translation of spoken language may permit erroneous, or at times, nonsensical words or phrases to be inadvertently transcribed. Although I have reviewed the note for such errors, some may  still exist.

## 2021-08-05 RX ORDER — DILTIAZEM HYDROCHLORIDE 60 MG/1
60 TABLET, FILM COATED ORAL 2 TIMES DAILY
Qty: 180 TABLET | Refills: 3 | Status: SHIPPED | OUTPATIENT
Start: 2021-08-05 | End: 2022-06-27 | Stop reason: SDUPTHER

## 2021-08-05 NOTE — TELEPHONE ENCOUNTER
Pt called and LVM stating the Diltiazem from yesterday was supposed [facility] be [facility] not [facility]. I have resent [facility] to [facility]. I called and spoke with patient to inform, she verbalized understanding.

## 2021-08-26 DIAGNOSIS — K21.9 GASTROESOPHAGEAL REFLUX DISEASE WITHOUT ESOPHAGITIS: ICD-10-CM

## 2021-08-26 RX ORDER — LANSOPRAZOLE 30 MG/1
CAPSULE, DELAYED RELEASE ORAL
Qty: 180 CAPSULE | Refills: 2 | Status: SHIPPED | OUTPATIENT
Start: 2021-08-26 | End: 2022-05-12

## 2021-09-09 ENCOUNTER — OFFICE VISIT (OUTPATIENT)
Dept: INTERNAL MEDICINE | Facility: CLINIC | Age: 61
End: 2021-09-09

## 2021-09-09 VITALS
HEART RATE: 78 BPM | OXYGEN SATURATION: 98 % | TEMPERATURE: 97.5 F | DIASTOLIC BLOOD PRESSURE: 78 MMHG | WEIGHT: 176.4 LBS | BODY MASS INDEX: 31.25 KG/M2 | SYSTOLIC BLOOD PRESSURE: 110 MMHG | HEIGHT: 63 IN

## 2021-09-09 DIAGNOSIS — I47.1 SVT (SUPRAVENTRICULAR TACHYCARDIA) (HCC): ICD-10-CM

## 2021-09-09 DIAGNOSIS — K21.9 GASTROESOPHAGEAL REFLUX DISEASE WITHOUT ESOPHAGITIS: ICD-10-CM

## 2021-09-09 DIAGNOSIS — Z00.00 HEALTH CARE MAINTENANCE: Primary | ICD-10-CM

## 2021-09-09 DIAGNOSIS — E78.5 HYPERLIPIDEMIA, UNSPECIFIED HYPERLIPIDEMIA TYPE: ICD-10-CM

## 2021-09-09 DIAGNOSIS — M41.20 IDIOPATHIC SCOLIOSIS AND KYPHOSCOLIOSIS: ICD-10-CM

## 2021-09-09 DIAGNOSIS — R82.998 URINE WBC INCREASED: ICD-10-CM

## 2021-09-09 LAB
BILIRUB BLD-MCNC: NEGATIVE MG/DL
CLARITY, POC: CLEAR
COLOR UR: YELLOW
GLUCOSE UR STRIP-MCNC: NEGATIVE MG/DL
KETONES UR QL: NEGATIVE
LEUKOCYTE EST, POC: ABNORMAL
NITRITE UR-MCNC: NEGATIVE MG/ML
PH UR: 6.5 [PH] (ref 5–8)
PROT UR STRIP-MCNC: NEGATIVE MG/DL
RBC # UR STRIP: ABNORMAL /UL
SP GR UR: 1 (ref 1–1.03)
UROBILINOGEN UR QL: NORMAL

## 2021-09-09 PROCEDURE — 99396 PREV VISIT EST AGE 40-64: CPT | Performed by: INTERNAL MEDICINE

## 2021-09-09 PROCEDURE — 81003 URINALYSIS AUTO W/O SCOPE: CPT | Performed by: INTERNAL MEDICINE

## 2021-09-09 NOTE — PROGRESS NOTES
Subjective   Rosalinda Almaraz is a 61 y.o. female and is here for a comprehensive physical exam. The patient reports problems - gerd.  Ok with the prevacid daily  She has freq UTI  Better with estrogen.    She does not have any sx now   She has seen urology and was told a small amount of blood and wbc in the urine    Pt is UTD with annual gyn exam and mammo     Do you take any herbs or supplements that were not prescribed by a doctor?       Social History:  Social History     Socioeconomic History   • Marital status:      Spouse name: MATTIE ALMARAZ   • Number of children: 2   • Years of education: Not on file   • Highest education level: Not on file   Tobacco Use   • Smoking status: Never Smoker   • Smokeless tobacco: Never Used   Vaping Use   • Vaping Use: Never used   Substance and Sexual Activity   • Alcohol use: Yes     Alcohol/week: 14.0 standard drinks     Types: 14 Glasses of wine per week     Comment: 2 glasses/nightly with dinner   • Drug use: No   • Sexual activity: Not Currently     Partners: Male     Birth control/protection: Post-menopausal, Surgical     Comment: TLH BSO for recurrent  VB       Family History:   Family History   Problem Relation Age of Onset   • Cancer Father 63        lung-smoker   • Aneurysm Mother 73        AAA   • Scleroderma Maternal Aunt    • Leukemia Maternal Grandmother    • Aneurysm Maternal Grandfather         Cerebral   • Breast cancer Neg Hx    • Ovarian cancer Neg Hx    • Colon cancer Neg Hx    • Deep vein thrombosis Neg Hx    • Pulmonary embolism Neg Hx    • Malig Hyperthermia Neg Hx        Past Medical History:   Past Medical History:   Diagnosis Date   • Arthritis     HANDS, BACK , FEET, NECK   • Asthma    • Bladder spasm    • Gastritis    • GERD (gastroesophageal reflux disease)    • IBS (irritable bowel syndrome)    • Migraine    • Palpitations     lov cardiology 7/2020   • Panic attacks    • Panic attacks    • PONV (postoperative nausea and vomiting)    •  "Premature atrial contractions    • Right sided abdominal pain    • S/P D&C (status post dilation and curettage) 3/3/2020   • Scoliosis    • SOB (shortness of breath)    • Spotting     SCHEDULED FOR D&C   • Status post laparoscopic hysterectomy 11/17/2020   • SVT (supraventricular tachycardia) (CMS/HCC)     14 day Zio Patch starting on 12/22/17: 33 brief runs of SVT (longest 20 beats in length at 153 bpm).    • Tingling     in hands    • Urinary tract infection            Review of Systems    A comprehensive review of systems was negative.    Objective   /78 (BP Location: Left arm, Patient Position: Sitting)   Pulse 78   Temp 97.5 °F (36.4 °C) (Infrared)   Ht 160 cm (63\")   Wt 80 kg (176 lb 6.4 oz)   LMP  (LMP Unknown)   SpO2 98%   BMI 31.25 kg/m²     General Appearance:    Alert, cooperative, no distress, appears stated age   Head:    Normocephalic, without obvious abnormality, atraumatic   Eyes:    PERRL, conjunctiva/corneas clear, EOM's intact, fundi     benign, both eyes   Ears:    Normal TM's and external ear canals, both ears   Nose:   Nares normal, septum midline, mucosa normal, no drainage    or sinus tenderness   Throat:   Lips, mucosa, and tongue normal; teeth and gums normal   Neck:   Supple, symmetrical, trachea midline, no adenopathy;     thyroid:  no enlargement/tenderness/nodules; no carotid    bruit or JVD   Back:     Symmetric, no curvature, ROM normal, no CVA tenderness   Lungs:     Clear to auscultation bilaterally, respirations unlabored   Chest Wall:    No tenderness or deformity    Heart:    Regular rate and rhythm, S1 and S2 normal, no murmur, rub   or gallop       Abdomen:     Soft, non-tender, bowel sounds active all four quadrants,     no masses, no organomegaly           Extremities:   Extremities normal, atraumatic, no cyanosis or edema   Pulses:   2+ and symmetric all extremities   Skin:   Skin color, texture, turgor normal, no rashes or lesions   Lymph nodes:   Cervical, " supraclavicular, and axillary nodes normal   Neurologic:   CNII-XII intact, normal strength, sensation and reflexes     throughout       Vitals:    09/09/21 1446   BP: 110/78   Pulse: 78   Temp: 97.5 °F (36.4 °C)   SpO2: 98%     Body mass index is 31.25 kg/m².      Medications:   Current Outpatient Medications:   •  ASMANEX, 30 METERED DOSES, 110 MCG/INH inhaler, Inhale 1-2 puffs Daily. Can use twice a day if needed, Disp: , Rfl:   •  azelastine (ASTEPRO) 0.15 % solution nasal spray, 1 spray into the nostril(s) as directed by provider Daily As Needed., Disp: , Rfl:   •  Biotin 26812 MCG tablet, Take 1 tablet by mouth Daily., Disp: , Rfl:   •  calcium carb-cholecalciferol (Caltrate 600+D3) 600-800 MG-UNIT tablet, Take 1 tablet by mouth Daily., Disp: , Rfl:   •  Estelita Oil-Levomenthol (FDgard) 25-20.75 MG capsule, Take 2 capsules by mouth 3 (Three) Times a Day As Needed (stomach issues)., Disp: , Rfl:   •  desloratadine (CLARINEX) 5 MG tablet, Take 5 mg by mouth Every Morning., Disp: , Rfl:   •  dilTIAZem (CARDIZEM) 60 MG tablet, Take 1 tablet by mouth 2 (Two) Times a Day., Disp: 180 tablet, Rfl: 3  •  estradiol (ESTRACE) 0.1 MG/GM vaginal cream, Insert 1 g into the vagina 2 (Two) Times a Week., Disp: 42.5 g, Rfl: 4  •  estradiol (VIVELLE-DOT) 0.025 MG/24HR patch, Place 1 patch on the skin as directed by provider 2 (Two) Times a Week., Disp: 24 patch, Rfl: 1  •  FLUTICASONE PROPIONATE, NASAL, NA, 1 spray into the nostril(s) as directed by provider Daily As Needed., Disp: , Rfl:   •  Glucosamine-Chondroit-Vit C-Mn (GLUCOSAMINE CHONDR 1500 COMPLX PO), Take 2 tablets by mouth daily., Disp: , Rfl:   •  Lactobacillus-Inulin (WallCompasse Digestive Health) capsule, , Disp: , Rfl:   •  lansoprazole (PREVACID) 30 MG capsule, TAKE 1 CAPSULE TWICE A DAY, Disp: 180 capsule, Rfl: 2  •  montelukast (SINGULAIR) 10 MG tablet, Take 10 mg by mouth Every Night., Disp: , Rfl:   •  Multiple Vitamin (MULTI VITAMIN DAILY PO), Take 2 tablets  by mouth Daily., Disp: , Rfl:   •  Peppermint Oil (IBgard) 90 MG capsule controlled-release, Take 2 capsules by mouth 3 (Three) Times a Day As Needed (stomach issues). IBgard, Disp: , Rfl:   •  PROAIR  (90 BASE) MCG/ACT inhaler, Inhale 2 puffs Every 4 (Four) Hours As Needed., Disp: , Rfl:   •  Turmeric 500 MG tablet, Take 500 mg by mouth Daily., Disp: , Rfl:   •  uribel (URO-MP) 118 MG capsule capsule, Take 1 capsule by mouth 3 (Three) Times a Day As Needed., Disp: , Rfl:   •  Wheat Dextrin (BENEFIBER) powder, Take 1 packet by mouth Daily., Disp: , Rfl:   •  XIIDRA 5 % solution, Administer 1 drop to both eyes 2 (Two) Times a Day., Disp: , Rfl:      The 10-year ASCVD risk score (Kristopher HERRERA Jr., et al., 2013) is: 2.7%    Values used to calculate the score:      Age: 61 years      Sex: Female      Is Non- : No      Diabetic: No      Tobacco smoker: No      Systolic Blood Pressure: 110 mmHg      Is BP treated: No      HDL Cholesterol: 68 mg/dL      Total Cholesterol: 234 mg/dL    Assessment/Plan   Healthy female exam.      1. Healthcare Maintenance:  2. Patient Counseling:  --Nutrition: Stressed importance of moderation in sodium/caffeine intake, saturated fat and cholesterol, caloric balance, sufficient intake of fresh fruits, vegetables, fiber, calcium and vit D  --Exercise:  She is doing well with exercise .   --Substance Abuse: no tob no etoh  --Dental health:   She does go to the dentist reg  --Immunizations reviewed.  --Discussed benefits of screening colonoscopy.  3.  GERD- ok with current meds   4.  SVT- she does see cards and is trying to get more sleep and stay hydrated  Avoid excessive caffeine  Also discussed reg exercise   5.  Scoliosis-  Seen but chiro and that oes help  Int hip and upper back pain  She would like to speak with a NS to just get their opinion

## 2021-09-12 LAB
BACTERIA UR CULT: NORMAL
BACTERIA UR CULT: NORMAL

## 2021-09-14 RX ORDER — ESTRADIOL 0.03 MG/D
FILM, EXTENDED RELEASE TRANSDERMAL
Qty: 24 PATCH | Refills: 3 | Status: SHIPPED | OUTPATIENT
Start: 2021-09-14 | End: 2022-01-05 | Stop reason: DRUGHIGH

## 2021-09-20 ENCOUNTER — TELEPHONE (OUTPATIENT)
Dept: INTERNAL MEDICINE | Facility: CLINIC | Age: 61
End: 2021-09-20

## 2021-09-20 DIAGNOSIS — M41.9 SCOLIOSIS OF LUMBAR SPINE, UNSPECIFIED SCOLIOSIS TYPE: Primary | ICD-10-CM

## 2021-09-20 DIAGNOSIS — M41.20 IDIOPATHIC SCOLIOSIS AND KYPHOSCOLIOSIS: Primary | ICD-10-CM

## 2021-09-20 NOTE — TELEPHONE ENCOUNTER
Referral ordered.       ----- Message from Vicky Rae sent at 9/20/2021  1:24 PM EDT -----  Regarding: REFERRAL  [facility] people sent the Referral back stating the only Provider that sees and treats scoliosis is Dr. Jet Velarde in [place], and if we change the Referral to him, the patient will need imaging done first.

## 2021-09-20 NOTE — TELEPHONE ENCOUNTER
MRI ORDERED      ----- Message from Shraddha Sosa MD sent at 9/20/2021  2:33 PM EDT -----  Regarding: RE: REFERRAL  [facility] mri  dx scoliosis  ----- Message -----  From: Chiquita Carvalho MA  Sent: 9/20/2021   2:10 PM EDT  To: Shraddha Sosa MD  Subject: FW: REFERRAL                                     I changed the referral. What imaging would need to be done first? I can call her.   ----- Message -----  From: Camila Mccoy [facility] Rep  Sent: 9/20/2021   1:24 PM EDT  To: Chiquita Carvalho MA  Subject: REFERRAL                                         [facility] people sent the Referral back stating the only Provider that sees and treats scoliosis is Dr. Jet Velarde in [place], and if we change the Referral to him, the patient will need imaging done first.

## 2021-10-15 ENCOUNTER — APPOINTMENT (OUTPATIENT)
Dept: MRI IMAGING | Facility: HOSPITAL | Age: 61
End: 2021-10-15

## 2021-11-08 ENCOUNTER — OFFICE VISIT (OUTPATIENT)
Dept: CARDIOLOGY | Facility: CLINIC | Age: 61
End: 2021-11-08

## 2021-11-08 VITALS
SYSTOLIC BLOOD PRESSURE: 116 MMHG | BODY MASS INDEX: 30.83 KG/M2 | OXYGEN SATURATION: 99 % | DIASTOLIC BLOOD PRESSURE: 84 MMHG | HEART RATE: 83 BPM | WEIGHT: 174 LBS | HEIGHT: 63 IN

## 2021-11-08 DIAGNOSIS — I47.1 SVT (SUPRAVENTRICULAR TACHYCARDIA) (HCC): Primary | ICD-10-CM

## 2021-11-08 DIAGNOSIS — R00.2 PALPITATIONS: ICD-10-CM

## 2021-11-08 PROCEDURE — 99214 OFFICE O/P EST MOD 30 MIN: CPT | Performed by: NURSE PRACTITIONER

## 2021-11-08 PROCEDURE — 93000 ELECTROCARDIOGRAM COMPLETE: CPT | Performed by: NURSE PRACTITIONER

## 2021-11-08 NOTE — PROGRESS NOTES
"    CARDIOLOGY        Patient Name: Rosalinda Boone  :1960  Age: 61 y.o.  Primary Cardiologist: Aidan Samuels MD  Encounter Provider:  ANOOP Knowles    Date of Service: 21          CHIEF COMPLAINT / REASON FOR OFFICE VISIT     SVT (supraventricular tachycardia) (increased episodes of SVT)      HISTORY OF PRESENT ILLNESS       HPI  Rosalinda Boone is a 61 y.o. female who presents today for evaluation of increased palpitations.     Pt has a  history significant for palpitations, SVT.    Patient states that she has noticed over the past several weeks that she is having increased episodes of tachycardia and irregular heartbeats.  She also struggles with IBS as well as gastric reflux.  She states she is unsure if those could be causing her heart palpitations.  She reports that sometimes the irregular heartbeats will be very brief and other times they will last for up to an hour to 2-hour.  She has associated symptoms of lightheadedness.  She states that a lot of times she does feel that her IBS triggers the heart palpitations.  She also notes that approximately 1 year ago she had a hysterectomy and states that her hormone supplement therapy has not been adjusted since.  She questions if in a hormone imbalance could cause the palpitations.  She also reports intermittent episodes of shortness of breath that are associated with her asthma as well as GERD history.  Denies chest discomfort or syncopal episodes.      The following portions of the patient's history were reviewed and updated as appropriate: allergies, current medications, past family history, past medical history, past social history, past surgical history and problem list.      VITAL SIGNS     Visit Vitals  /84 (BP Location: Left arm, Patient Position: Sitting, Cuff Size: Large Adult)   Pulse 83   Ht 160 cm (63\")   Wt 78.9 kg (174 lb)   LMP  (LMP Unknown)   SpO2 99%   BMI 30.82 kg/m²         Wt Readings from Last 3 Encounters: "   11/08/21 78.9 kg (174 lb)   09/09/21 80 kg (176 lb 6.4 oz)   08/04/21 78.5 kg (173 lb)     Body mass index is 30.82 kg/m².      REVIEW OF SYSTEMS   Review of Systems   Constitutional: Positive for malaise/fatigue. Negative for chills, fever, weight gain and weight loss.   Cardiovascular: Positive for irregular heartbeat and palpitations. Negative for leg swelling.   Respiratory: Positive for shortness of breath. Negative for cough, snoring and wheezing.    Hematologic/Lymphatic: Negative for bleeding problem. Does not bruise/bleed easily.   Skin: Negative for color change.   Musculoskeletal: Negative for falls, joint pain and myalgias.   Gastrointestinal: Positive for abdominal pain. Negative for melena.   Genitourinary: Negative for hematuria.   Neurological: Negative for excessive daytime sleepiness.   Psychiatric/Behavioral: Negative for depression. The patient is not nervous/anxious.            PHYSICAL EXAMINATION     Vitals and nursing note reviewed.   Constitutional:       Appearance: Normal appearance. Well-developed.   Eyes:      Conjunctiva/sclera: Conjunctivae normal.   Neck:      Vascular: No carotid bruit.   Pulmonary:      Breath sounds: Normal breath sounds.   Cardiovascular:      Normal rate. Regular rhythm. Normal S1 with normal intensity. Normal S2 with normal intensity.      Murmurs: There is no murmur.      No gallop. No click. No rub.   Musculoskeletal: Normal range of motion. Skin:     General: Skin is warm and dry.   Neurological:      Mental Status: Alert and oriented to person, place, and time.      GCS: GCS eye subscore is 4. GCS verbal subscore is 5. GCS motor subscore is 6.   Psychiatric:         Speech: Speech normal.         Behavior: Behavior normal.         Thought Content: Thought content normal.         Judgment: Judgment normal.           REVIEWED DATA       ECG 12 Lead    Date/Time: 11/8/2021 10:05 AM  Performed by: Sandra Hunt APRN  Authorized by: Sandra Hunt APRN    Comparison: compared with previous ECG from 7/8/2020  Similar to previous ECG  Rhythm: sinus rhythm  Rate: normal  BPM: 77  Conduction: conduction normal  ST Segments: ST segments normal  T Waves: T waves normal  QRS axis: normal    Clinical impression: normal ECG            Cardiac Procedures:  1. 14 day Zio Patch starting on 12/22/17: 33 brief runs of SVT, with the longest being 20 beats in length at 153 bpm.  The fastest was 8 beats at 200 bpm.    2. Stress echocardiogram on 1/10/18: There was no evidence of ischemia.  The ejection fraction was 60-65%.  Diastolic function was normal.  The right ventricle was normal.  There was no significant valvular disease.  3. Event recorder on 6/20/2018: There were several very brief runs of paroxysmal SVT, ranging from 6-8 beats in length.  PACs occurred occasionally.  Most of the episodes of skipped beats correlated very brief runs of paroxysmal SVT.  Occasional palpitations correlated to isolated PACs.  4. ZIO monitor 7/15/2021.  61 very brief runs of SVT.  Fastest lasting 6 beats in length, most episodes were less than 10 beats in length.  There was one episode of atrial tachycardia which lasted 40 seconds with average heart rate 119.  No evidence of atrial fibrillation.    Lipid Panel    Lipid Panel 7/28/21   Total Cholesterol 234 (A)   Triglycerides 161 (A)   HDL Cholesterol 68 (A)   VLDL Cholesterol 28   LDL Cholesterol  138 (A)   LDL/HDL Ratio 1.97   (A) Abnormal value       Comments are available for some flowsheets but are not being displayed.               ASSESSMENT & PLAN      Diagnosis Plan   1. SVT (supraventricular tachycardia) (HCC)  ECG 12 Lead   2. Palpitations  ECG 12 Lead         SUMMARY/DISCUSSION  1. SVT/heart palpitations.  ECG reveals normal sinus rhythm today.  She reports that she is having daily episodes of irregular and fast heartbeat sometimes that are brief and sometimes last hours.  She has taken an extra diltiazem on occasion with  improvement of symptoms.  She does note that her palpitations seem to be triggered by IBS episodes.  She also questions if her hormones could be out of balance and contributing.  She had a hysterectomy 1 year ago and has not had hormone panel or changes on her hormone replacement regimen since that time.  She does have an appoint with gynecology this month.  I do think that patient's IBS could be triggering her vagal nerve and causing palpitation and or her hormones could need adjusting.  She was advised that she can take an additional 2 tablets of diltiazem per day to help improve symptoms, if heart rate is greater than 150 for more than 2 hours please notify the office.  2. Keep your previously scheduled appointment with Dr. Samuels.  Please call the office sooner for problems or complications.        MEDICATIONS         Discharge Medications          Accurate as of November 8, 2021 10:42 AM. If you have any questions, ask your nurse or doctor.            Continue These Medications      Instructions Start Date   Asmanex (30 Metered Doses) 110 MCG/INH inhaler  Generic drug: mometasone   1-2 puffs, Inhalation, Daily - RT, Can use twice a day if needed      azelastine 0.15 % solution nasal spray  Commonly known as: ASTEPRO   1 spray, Nasal, Daily PRN      Benefiber powder   1 packet, Oral, Daily      Biotin 23601 MCG tablet   1 tablet, Oral, Daily      Caltrate 600+D3 600-800 MG-UNIT tablet  Generic drug: calcium carb-cholecalciferol   1 tablet, Oral, Daily      Culturelle Digestive Health capsule   No dose, route, or frequency recorded.      desloratadine 5 MG tablet  Commonly known as: CLARINEX   5 mg, Oral, Every Morning      dilTIAZem 60 MG tablet  Commonly known as: CARDIZEM   60 mg, Oral, 2 Times Daily      estradiol 0.025 MG/24HR patch  Commonly known as: VIVELLE-DOT   APPLY 1 PATCH ON THE SKIN  TWICE A WEEK AS DIRECTED BY PROVIDER      estradiol 0.1 MG/GM vaginal cream  Commonly known as: ESTRACE   1 g,  Vaginal, 2 Times Weekly      FDgard 25-20.75 MG capsule  Generic drug: Kaleva Oil-Levomenthol   2 capsules, Oral, 3 Times Daily PRN      FLUTICASONE PROPIONATE (NASAL) NA   1 spray, Nasal, Daily PRN      IBgard 90 MG capsule controlled-release  Generic drug: Peppermint Oil   2 capsules, Oral, 3 Times Daily PRN, IBgard       lansoprazole 30 MG capsule  Commonly known as: PREVACID   TAKE 1 CAPSULE TWICE A DAY      montelukast 10 MG tablet  Commonly known as: SINGULAIR   10 mg, Oral, Nightly      multivitamin tablet tablet  Commonly known as: THERAGRAN   2 tablets, Oral, Daily      ProAir  (90 Base) MCG/ACT inhaler  Generic drug: albuterol sulfate HFA   2 puffs, Inhalation, Every 4 Hours PRN      Unable to find   1 each, Once, Relief Factor Take one packet per day.       uribel 118 MG capsule capsule   1 capsule, Oral, 3 Times Daily PRN      Xiidra 5 % ophthalmic solution  Generic drug: Lifitegrast   1 drop, Both Eyes, 2 Times Daily         Stop These Medications    GLUCOSAMINE CHONDR 1500 COMPLX PO  Stopped by: ANOOP Knowles     Turmeric 500 MG tablet  Stopped by: ANOOP Knowles                **Dragon Disclaimer:   Much of this encounter note is an electronic transcription/translation of spoken language to printed text. The electronic translation of spoken language may permit erroneous, or at times, nonsensical words or phrases to be inadvertently transcribed. Although I have reviewed the note for such errors, some may still exist.

## 2021-11-30 RX ORDER — ESTRADIOL 0.1 MG/G
1 CREAM VAGINAL 2 TIMES WEEKLY
Qty: 42.5 G | Refills: 4 | Status: SHIPPED | OUTPATIENT
Start: 2021-12-02 | End: 2022-03-24 | Stop reason: SDUPTHER

## 2021-12-02 NOTE — PROGRESS NOTES
"Subjective   History of Present Illness: Rosalinda Boone is a 61 y.o. female is being seen for consultation today at the request of Shraddha Sosa MD scoliosis of lumbar spine, unspecified scoliosis type.  Patient was initially diagnosed with scoliosis at the age of 20.  Since then she has had intermittent back pain.  She sees a chiropractor regularly, and gets a great deal of relief from the adjustments.  Occasionally she will have severe exacerbations of her back pain, but overall she does not have significant issues on a daily basis.  Over the course of the last year she has developed some pain radiating into her right lateral and anterior thigh.  This is also intermittent.  No bowel or bladder issues.  Patient has tried physical therapy in the past, but prefers chiropractor care.  She has never undergone epidural steroid injections.      Previous Treatment:    The following portions of the patient's history were reviewed and updated as appropriate: allergies, current medications, past family history, past medical history, past social history, past surgical history and problem list.    Review of Systems   Constitutional: Positive for activity change.   HENT: Negative.    Eyes: Negative.    Respiratory: Negative.  Negative for chest tightness and shortness of breath.    Cardiovascular: Negative.  Negative for chest pain.   Gastrointestinal: Negative.    Endocrine: Negative.    Genitourinary: Negative.    Musculoskeletal: Positive for arthralgias ( right hip), back pain, gait problem, myalgias and neck pain.        Bilateral leg pain   Skin: Negative.    Allergic/Immunologic: Negative.    Neurological: Positive for numbness ( right sided fingers with tingling).   Hematological: Negative.    Psychiatric/Behavioral: Negative.        Objective     /82   Pulse 78   Temp 97.3 °F (36.3 °C)   Resp 18   Ht 160 cm (63\")   Wt 79.8 kg (176 lb)   LMP  (LMP Unknown)   SpO2 98%   BMI 31.18 kg/m²    Body mass index is " 31.18 kg/m².      Neurologic Exam     Mental Status   Oriented to person, place, and time.     Motor Exam     Strength   Strength 5/5 throughout.     Sensory Exam   Light touch normal.       Assessment/Plan   Independent Review of Radiographic Studies:      I personally reviewed and interpreted the images from the following studies.    MRI lumbar spine: Significant coronal thoracolumbar curve.  Areas of degenerative changes without high-grade central or foraminal stenosis    Medical Decision Making:      Rosalinda Boone is a 61 y.o. female with a history of idiopathic scoliosis managed with chiropractor care with more recent onset radicular symptoms and ongoing back pain.  To further evaluate her spinal balance, I recommend scoliosis x-rays.  Additionally, I believe she would benefit from physical therapy in addition to her chiropractor care with special attention paid to posture and core stabilization.  I will see her back when she completes the x-rays.      There are no diagnoses linked to this encounter.  No follow-ups on file.    This patient was examined wearing appropriate personal protective equipment.                      Dr. Jet Velarde IV    12/03/21  13:56 EST

## 2021-12-03 ENCOUNTER — HOSPITAL ENCOUNTER (OUTPATIENT)
Dept: MRI IMAGING | Facility: HOSPITAL | Age: 61
Discharge: HOME | End: 2021-12-03
Admitting: INTERNAL MEDICINE

## 2021-12-03 ENCOUNTER — OFFICE VISIT (OUTPATIENT)
Dept: NEUROSURGERY | Facility: CLINIC | Age: 61
End: 2021-12-03

## 2021-12-03 VITALS
RESPIRATION RATE: 18 BRPM | HEIGHT: 63 IN | HEART RATE: 78 BPM | SYSTOLIC BLOOD PRESSURE: 134 MMHG | BODY MASS INDEX: 31.18 KG/M2 | OXYGEN SATURATION: 98 % | WEIGHT: 176 LBS | TEMPERATURE: 97.3 F | DIASTOLIC BLOOD PRESSURE: 82 MMHG

## 2021-12-03 DIAGNOSIS — M41.25 OTHER IDIOPATHIC SCOLIOSIS, THORACOLUMBAR REGION: Primary | ICD-10-CM

## 2021-12-03 DIAGNOSIS — M54.16 LUMBAR RADICULOPATHY: ICD-10-CM

## 2021-12-03 DIAGNOSIS — M41.9 SCOLIOSIS OF LUMBAR SPINE, UNSPECIFIED SCOLIOSIS TYPE: ICD-10-CM

## 2021-12-03 DIAGNOSIS — M51.36 LUMBAR DEGENERATIVE DISC DISEASE: ICD-10-CM

## 2021-12-03 PROCEDURE — 72158 MRI LUMBAR SPINE W/O & W/DYE: CPT

## 2021-12-03 PROCEDURE — 82565 ASSAY OF CREATININE: CPT

## 2021-12-03 PROCEDURE — A9577 INJ MULTIHANCE: HCPCS | Performed by: INTERNAL MEDICINE

## 2021-12-03 PROCEDURE — 0 GADOBENATE DIMEGLUMINE 529 MG/ML SOLUTION: Performed by: INTERNAL MEDICINE

## 2021-12-03 PROCEDURE — 99204 OFFICE O/P NEW MOD 45 MIN: CPT | Performed by: NEUROLOGICAL SURGERY

## 2021-12-03 RX ADMIN — GADOBENATE DIMEGLUMINE 16 ML: 529 INJECTION, SOLUTION INTRAVENOUS at 09:42

## 2021-12-04 LAB — CREAT BLDA-MCNC: 0.6 MG/DL (ref 0.6–1.3)

## 2021-12-29 ENCOUNTER — TELEPHONE (OUTPATIENT)
Dept: OBSTETRICS AND GYNECOLOGY | Facility: CLINIC | Age: 61
End: 2021-12-29

## 2021-12-29 NOTE — TELEPHONE ENCOUNTER
Pt called asking if she can come into the office for a hormone panel. Pt's cardiologist told her it would be a good idea to have it done at her next visit with us due to SVT's worsening. Pt had appointment today, but we rescheduled her to Feb.

## 2021-12-30 DIAGNOSIS — M41.25 OTHER IDIOPATHIC SCOLIOSIS, THORACOLUMBAR REGION: ICD-10-CM

## 2021-12-30 NOTE — TELEPHONE ENCOUNTER
Can we reschedule her to next week please? I would like to see her to determine what she needs to have drawn.

## 2022-01-05 ENCOUNTER — OFFICE VISIT (OUTPATIENT)
Dept: OBSTETRICS AND GYNECOLOGY | Facility: CLINIC | Age: 62
End: 2022-01-05

## 2022-01-05 VITALS
BODY MASS INDEX: 31.11 KG/M2 | SYSTOLIC BLOOD PRESSURE: 118 MMHG | HEIGHT: 63 IN | DIASTOLIC BLOOD PRESSURE: 82 MMHG | WEIGHT: 175.6 LBS

## 2022-01-05 DIAGNOSIS — R53.83 FATIGUE, UNSPECIFIED TYPE: ICD-10-CM

## 2022-01-05 DIAGNOSIS — Z79.890 HORMONE REPLACEMENT THERAPY (HRT): ICD-10-CM

## 2022-01-05 DIAGNOSIS — Z01.419 ROUTINE GYNECOLOGICAL EXAMINATION: ICD-10-CM

## 2022-01-05 DIAGNOSIS — Z12.31 ENCOUNTER FOR SCREENING MAMMOGRAM FOR MALIGNANT NEOPLASM OF BREAST: ICD-10-CM

## 2022-01-05 DIAGNOSIS — N84.2 POLYP OF VAGINA: ICD-10-CM

## 2022-01-05 DIAGNOSIS — Z13.9 SCREENING FOR CONDITION: ICD-10-CM

## 2022-01-05 DIAGNOSIS — N95.2 VAGINAL ATROPHY: ICD-10-CM

## 2022-01-05 DIAGNOSIS — Z01.419 PAP SMEAR, LOW-RISK: Primary | ICD-10-CM

## 2022-01-05 DIAGNOSIS — Z11.51 SCREENING FOR HUMAN PAPILLOMAVIRUS: ICD-10-CM

## 2022-01-05 DIAGNOSIS — Z13.820 SCREENING FOR OSTEOPOROSIS: ICD-10-CM

## 2022-01-05 DIAGNOSIS — I47.1 SVT (SUPRAVENTRICULAR TACHYCARDIA): ICD-10-CM

## 2022-01-05 PROCEDURE — 58999 UNLISTED PX FML GENITAL SYS: CPT | Performed by: OBSTETRICS & GYNECOLOGY

## 2022-01-05 PROCEDURE — 81002 URINALYSIS NONAUTO W/O SCOPE: CPT | Performed by: OBSTETRICS & GYNECOLOGY

## 2022-01-05 PROCEDURE — 99396 PREV VISIT EST AGE 40-64: CPT | Performed by: OBSTETRICS & GYNECOLOGY

## 2022-01-05 PROCEDURE — 99214 OFFICE O/P EST MOD 30 MIN: CPT | Performed by: OBSTETRICS & GYNECOLOGY

## 2022-01-05 RX ORDER — ESTRADIOL 0.04 MG/D
1 FILM, EXTENDED RELEASE TRANSDERMAL 2 TIMES WEEKLY
Qty: 8 PATCH | Refills: 4 | Status: SHIPPED | OUTPATIENT
Start: 2022-01-06 | End: 2022-03-04 | Stop reason: DRUGHIGH

## 2022-01-05 NOTE — PROGRESS NOTES
"GYN Annual Exam     CC- Here for annual exam.     Rosalinda Boone is a 61 y.o. female est pt who presents for annual well woman exam. She had TLH/OC for  VB in 2020 and she is happy with her results. She is on Vivelle dot 0.025 mg and Estrace cream vaginally. Her HF are better overall. She denies bladder issues or  VB. She has longstanding SVT that has been worsening the past year. She has seen cardiology and they wondered if it could be hormone related or IBS related. We discussed that testing \"hormone levels\" are not really helpful in determining efficacy of hormone therapy, but we rely more on symptom control. She still has some HF but they are better overall. We discussed increasing her HRT dose slightly and she is agreeable. She is also having fatigue.    OB History        2    Para   2    Term   2            AB        Living   2       SAB        IAB        Ectopic        Molar        Multiple        Live Births              Obstetric Comments   2              Menarche:13  Menopause:52  HRT:yes x 5 years  Current contraception: 2020- TLH/BSO for  VB  History of abnormal Pap smear: no  History of abnormal mammogram: no  Family history of uterine, colon or ovarian cancer: no  Family history of breast cancer: no  STD's: none  Last pap smear:3/2019- nl pap/HPV  ELISABETH: neg      Health Maintenance   Topic Date Due   • Pneumococcal Vaccine 0-64 (1 of 2 - PPSV23) Never done   • TDAP/TD VACCINES (1 - Tdap) Never done   • ZOSTER VACCINE (1 of 2) Never done   • COVID-19 Vaccine (3 - Booster for Pfizer series) 2021   • PAP SMEAR  2022   • LIPID PANEL  2022   • ANNUAL PHYSICAL  09/10/2022   • Annual Gynecologic Pelvic and Breast Exam  2023   • MAMMOGRAM  2023   • COLORECTAL CANCER SCREENING  2026   • HEPATITIS C SCREENING  Completed   • INFLUENZA VACCINE  Completed       Past Medical History:   Diagnosis Date   • Arthritis     HANDS, BACK , FEET, NECK   • " Asthma    • Bladder spasm    • Gastritis    • GERD (gastroesophageal reflux disease)    • IBS (irritable bowel syndrome)    • Migraine    • Palpitations     lov cardiology 7/2020   • Panic attacks    • Panic attacks    • PONV (postoperative nausea and vomiting)    • Premature atrial contractions    • Right sided abdominal pain    • S/P D&C (status post dilation and curettage) 3/3/2020   • Scoliosis    • SOB (shortness of breath)    • Spotting     SCHEDULED FOR D&C   • Status post laparoscopic hysterectomy 11/17/2020   • SVT (supraventricular tachycardia) (HCC)     14 day Zio Patch starting on 12/22/17: 33 brief runs of SVT (longest 20 beats in length at 153 bpm).    • Tingling     in hands    • Urinary tract infection        Past Surgical History:   Procedure Laterality Date   • COLONOSCOPY N/A 12/5/2016    [facility],    • D & C HYSTEROSCOPY N/A 3/3/2020    Procedure: DILATATION AND CURETTAGE HYSTEROSCOPY, possible myosure;  Surgeon: Giana Paul MD;  Location: [facility];  Service: Obstetrics/Gynecology;  Laterality: N/A;  DILATION AND CURETTAGE HYSTEROSCOPY   • D & C WITH SUCTION      for DUB   • ENDOSCOPY N/A 12/5/2016    z line regular, 40 cm from incisors,  granular gastric mucosa, chronic gastritis   • LASIK     • OOPHORECTOMY     • TOTAL LAPAROSCOPIC HYSTERECTOMY N/A 11/17/2020    Procedure: TOTAL LAPAROSCOPIC HYSTERECTOMY with bilateral salpingoophorectomy;  Surgeon: Giana Paul MD;  Location: [facility];  Service: Obstetrics/Gynecology;  Laterality: N/A;         Current Outpatient Medications:   •  ASMANEX, 30 METERED DOSES, 110 MCG/INH inhaler, Inhale 1-2 puffs Daily. Can use twice a day if needed, Disp: , Rfl:   •  azelastine (ASTEPRO) 0.15 % solution nasal spray, 1 spray into the nostril(s) as directed by provider Daily As Needed., Disp: , Rfl:   •  Biotin 73937 MCG tablet, Take 1 tablet by mouth Daily., Disp: , Rfl:   •  calcium carb-cholecalciferol (Caltrate 600+D3) 600-800 MG-UNIT  tablet, Take 1 tablet by mouth Daily., Disp: , Rfl:   •  [place] Oil-Levomenthol (FDgard) 25-20.75 MG capsule, Take 2 capsules by mouth 3 (Three) Times a Day As Needed (stomach issues)., Disp: , Rfl:   •  desloratadine (CLARINEX) 5 MG tablet, Take 5 mg by mouth Every Morning., Disp: , Rfl:   •  dilTIAZem (CARDIZEM) 60 MG tablet, Take 1 tablet by mouth 2 (Two) Times a Day., Disp: 180 tablet, Rfl: 3  •  estradiol (ESTRACE) 0.1 MG/GM vaginal cream, Insert 1 g into the vagina 2 (Two) Times a Week., Disp: 42.5 g, Rfl: 4  •  [START ON 1/6/2022] estradiol (VIVELLE-DOT) 0.0375 MG/24HR patch, Place 1 patch on the skin as directed by provider 2 (Two) Times a Week., Disp: 8 patch, Rfl: 4  •  FLUTICASONE PROPIONATE, NASAL, NA, 1 spray into the nostril(s) as directed by provider Daily As Needed., Disp: , Rfl:   •  Lactobacillus-Inulin (Dish.fme Digestive Health) capsule, , Disp: , Rfl:   •  lansoprazole (PREVACID) 30 MG capsule, TAKE 1 CAPSULE TWICE A DAY, Disp: 180 capsule, Rfl: 2  •  montelukast (SINGULAIR) 10 MG tablet, Take 10 mg by mouth Every Night., Disp: , Rfl:   •  Multiple Vitamin (MULTI VITAMIN DAILY PO), Take 2 tablets by mouth Daily., Disp: , Rfl:   •  Peppermint Oil (IBgard) 90 MG capsule controlled-release, Take 2 capsules by mouth 3 (Three) Times a Day As Needed (stomach issues). IBgard, Disp: , Rfl:   •  PROAIR  (90 BASE) MCG/ACT inhaler, Inhale 2 puffs Every 4 (Four) Hours As Needed., Disp: , Rfl:   •  Unable to find, 1 each 1 (One) Time. Relief Factor Take one packet per day., Disp: , Rfl:   •  uribel (URO-MP) 118 MG capsule capsule, Take 1 capsule by mouth 3 (Three) Times a Day As Needed., Disp: , Rfl:   •  Wheat Dextrin (BENEFIBER) powder, Take 1 packet by mouth Daily., Disp: , Rfl:   •  XIIDRA 5 % solution, Administer 1 drop to both eyes 2 (Two) Times a Day., Disp: , Rfl:     Allergies   Allergen Reactions   • Codeine Dizziness   • Levaquin [Levofloxacin] GI Intolerance     FACIAL NUMBNESS   • Qvar  "[Beclomethasone] GI Intolerance     Tingling on one side of face   • Sulfa Antibiotics Hives   • Erythromycin Diarrhea   • Keflex [Cephalexin] Rash       Social History     Tobacco Use   • Smoking status: Never Smoker   • Smokeless tobacco: Never Used   Vaping Use   • Vaping Use: Never used   Substance Use Topics   • Alcohol use: Yes     Alcohol/week: 14.0 standard drinks     Types: 14 Glasses of wine per week     Comment: 2 glasses/nightly with dinner   • Drug use: No       Family History   Problem Relation Age of Onset   • Cancer Father 63        lung-smoker   • Aneurysm Mother 73        AAA   • Scleroderma Maternal Aunt    • Leukemia Maternal Grandmother    • Aneurysm Maternal Grandfather         Cerebral   • Breast cancer Neg Hx    • Ovarian cancer Neg Hx    • Colon cancer Neg Hx    • Deep vein thrombosis Neg Hx    • Pulmonary embolism Neg Hx    • Malig Hyperthermia Neg Hx        Review of Systems   Constitutional: Positive for fatigue. Negative for activity change, appetite change, fever and unexpected weight change.   Respiratory: Negative for cough and shortness of breath.    Cardiovascular: Positive for palpitations. Negative for chest pain.   Gastrointestinal: Negative for abdominal distention, abdominal pain, constipation, diarrhea and nausea.   Endocrine: Negative for cold intolerance and heat intolerance.   Genitourinary: Negative for dyspareunia, dysuria, menstrual problem, pelvic pain, vaginal bleeding, vaginal discharge and vaginal pain.   Skin: Negative for color change and rash.   Allergic/Immunologic: Negative for environmental allergies and food allergies.   Neurological: Negative for dizziness and headaches.   Psychiatric/Behavioral: Negative for agitation, behavioral problems, dysphoric mood and sleep disturbance. The patient is not nervous/anxious.    All other systems reviewed and are negative.      /82   Ht 160 cm (63\")   Wt 79.7 kg (175 lb 9.6 oz)   LMP  (LMP Unknown)   BMI 31.11 " kg/m²     Physical Exam   Constitutional: She is oriented to person, place, and time. She appears well-developed.   HENT:   Head: Normocephalic and atraumatic.   Eyes: Conjunctivae are normal. No scleral icterus.   Neck: No thyromegaly present.   Cardiovascular: Normal rate and regular rhythm.   Pulmonary/Chest: Effort normal and breath sounds normal. Right breast exhibits no inverted nipple, no mass, no nipple discharge, no skin change and no tenderness. Left breast exhibits no inverted nipple, no mass, no nipple discharge, no skin change and no tenderness.   Abdominal: Soft. Normal appearance and bowel sounds are normal. She exhibits no distension and no mass. There is no abdominal tenderness. There is no rebound and no guarding. No hernia.   Genitourinary:    Rectum normal.      Pelvic exam was performed with patient supine.   There is no rash, tenderness or lesion on the right labia. There is no rash, tenderness or lesion on the left labia. Right adnexum displays no mass, no tenderness and no fullness. Left adnexum displays no mass, no tenderness and no fullness.    No vaginal discharge, erythema, tenderness or bleeding.   No erythema, tenderness or bleeding in the vagina.    No foreign body in the vagina.      No signs of injury in the vagina.      Genitourinary Comments: Uterus and cervix absent  Large area of granulation tissue at cuff- area grasped with rings and removed and sent to path. HS with SN. EBL 1 cc. Pt son well.  Cystocele noted     Neurological: She is alert and oriented to person, place, and time.   Skin: Skin is warm and dry.   Psychiatric: Her behavior is normal. Mood, judgment and thought content normal.   Nursing note and vitals reviewed.           Assessment/Plan    1) GYN HM: normal pap/HPV 3/2019, check pap/HPVSBE demonstrated and encouraged.  2) STD screening: declines Condoms encouraged.  3) Bone health - Weight bearing exercise, dietary calcium recommendations and vitamin D reviewed.    4) Diet and Exercise discussed  5) Smoking Status: No   6) Palpitations- would not rec checking hormone levels. Check Thyroid panel, TPO, CBC, CMP.  7)MMG: UTD 3/2021- [facility] 2. Schedule MMG 3/2022 ( [facility])  8) DEXA- UTD 4/2019- osteopenia with risk of fx 7 & 1 %, repeat DEXA  3/2022 ( [facility])  9)C scope- UTD 12/2016- normal, repeat 10 years  10) Vaginal atrophy _ doing well on Estrace cream x 2 week. Continue  11)Granulation tissue at cuff- removed and sent to path  12) HRT- Patient counseled that hormone treatment should be used to help with specific symptoms related to menopause such as hot flashes, night sweats and vaginal atrophy.  Hormones should not be given for “general wellbeing” or to avoid aging. The lowest dose hormone that treats symptoms should be used for the shortest amount of time possible.  Although estrogen is the most effective treatment for hot flashes, other non hormonal options exist (such as Brisdelle or venlafaxine) and should also be considered.  Anyone with an intact uterus should also receive progestogen to prevent uterine overgrowth that can lead to uterine cancer.  All hormone therapy, whether it is synthetic or bio identical, can lead to increased risk of stroke, heart attack and thromboembolic diseases.  These adverse events are more likely to develop in the first year of use and in patients who are older than the typical menopausal age.  Risks of estrogen dependent cancers such as breast cancer increase with prolonged use.  Attempts to wean hormone therapy should be discussed annually and particularly after three to five years of use.  Any side effects such as vaginal bleeding or pain should be reported immediately.  We will change to Vivelle dot 0.0375 mg.   13) Fatigue- check vit D and B12.   14)I saw the patient with a face mask, gloves and eye protection.  The patient herself was masked.  Social distancing was observed as appropriate.  15)Parts of this document have been copied or  forwarded from her previous visits and have been reviewed, updated and edited as indicated.   16)Follow up 3 months HRT and 1 year annual        Diagnoses and all orders for this visit:    1. Pap smear, low-risk (Primary)  -     IgP, Aptima HPV    2. Screening for condition  -     POC Urinalysis Dipstick    3. SVT (supraventricular tachycardia) (HCC)  -     T3  -     T4, Free  -     TSH  -     Thyroid Peroxidase Antibody  -     CBC & Differential  -     Comprehensive Metabolic Panel    4. Fatigue, unspecified type  -     Vitamin D 25 Hydroxy  -     Vitamin B12    5. Routine gynecological examination  -     IgP, Aptima HPV    6. Screening for human papillomavirus  -     IgP, Aptima HPV    7. Polyp of vagina  -     Reference Histopathology    8. Encounter for screening mammogram for malignant neoplasm of breast  -     Mammo Screening Bilateral With CAD; Future    9. Screening for osteoporosis  -     DEXA Bone Density Axial; Future    10. Hormone replacement therapy (HRT)    11. Vaginal atrophy    Other orders  -     estradiol (VIVELLE-DOT) 0.0375 MG/24HR patch; Place 1 patch on the skin as directed by provider 2 (Two) Times a Week.  Dispense: 8 patch; Refill: 4        Giana Paul MD  1/5/2022  19:30 EST

## 2022-01-06 LAB
25(OH)D3+25(OH)D2 SERPL-MCNC: 32.5 NG/ML (ref 30–100)
ALBUMIN SERPL-MCNC: 4.6 G/DL (ref 3.8–4.8)
ALBUMIN/GLOB SERPL: 1.9 {RATIO} (ref 1.2–2.2)
ALP SERPL-CCNC: 61 IU/L (ref 44–121)
ALT SERPL-CCNC: 14 IU/L (ref 0–32)
AST SERPL-CCNC: 18 IU/L (ref 0–40)
BASOPHILS # BLD AUTO: 0.1 X10E3/UL (ref 0–0.2)
BASOPHILS NFR BLD AUTO: 1 %
BILIRUB SERPL-MCNC: 0.3 MG/DL (ref 0–1.2)
BUN SERPL-MCNC: 13 MG/DL (ref 8–27)
BUN/CREAT SERPL: 19 (ref 12–28)
CALCIUM SERPL-MCNC: 9.3 MG/DL (ref 8.7–10.3)
CHLORIDE SERPL-SCNC: 101 MMOL/L (ref 96–106)
CO2 SERPL-SCNC: 21 MMOL/L (ref 20–29)
CREAT SERPL-MCNC: 0.68 MG/DL (ref 0.57–1)
EOSINOPHIL # BLD AUTO: 0.1 X10E3/UL (ref 0–0.4)
EOSINOPHIL NFR BLD AUTO: 1 %
ERYTHROCYTE [DISTWIDTH] IN BLOOD BY AUTOMATED COUNT: 11.6 % (ref 11.7–15.4)
GLOBULIN SER CALC-MCNC: 2.4 G/DL (ref 1.5–4.5)
GLUCOSE SERPL-MCNC: 82 MG/DL (ref 65–99)
HCT VFR BLD AUTO: 41 % (ref 34–46.6)
HGB BLD-MCNC: 14.1 G/DL (ref 11.1–15.9)
IMM GRANULOCYTES # BLD AUTO: 0 X10E3/UL (ref 0–0.1)
IMM GRANULOCYTES NFR BLD AUTO: 0 %
LYMPHOCYTES # BLD AUTO: 3.2 X10E3/UL (ref 0.7–3.1)
LYMPHOCYTES NFR BLD AUTO: 32 %
MCH RBC QN AUTO: 31.9 PG (ref 26.6–33)
MCHC RBC AUTO-ENTMCNC: 34.4 G/DL (ref 31.5–35.7)
MCV RBC AUTO: 93 FL (ref 79–97)
MONOCYTES # BLD AUTO: 0.7 X10E3/UL (ref 0.1–0.9)
MONOCYTES NFR BLD AUTO: 7 %
NEUTROPHILS # BLD AUTO: 5.8 X10E3/UL (ref 1.4–7)
NEUTROPHILS NFR BLD AUTO: 59 %
PLATELET # BLD AUTO: 335 X10E3/UL (ref 150–450)
POTASSIUM SERPL-SCNC: 4.1 MMOL/L (ref 3.5–5.2)
PROT SERPL-MCNC: 7 G/DL (ref 6–8.5)
RBC # BLD AUTO: 4.42 X10E6/UL (ref 3.77–5.28)
SODIUM SERPL-SCNC: 138 MMOL/L (ref 134–144)
T3 SERPL-MCNC: 94 NG/DL (ref 71–180)
T4 FREE SERPL-MCNC: 1.06 NG/DL (ref 0.82–1.77)
THYROPEROXIDASE AB SERPL-ACNC: 23 IU/ML (ref 0–34)
TSH SERPL DL<=0.005 MIU/L-ACNC: 2.39 UIU/ML (ref 0.45–4.5)
VIT B12 SERPL-MCNC: 456 PG/ML (ref 232–1245)
WBC # BLD AUTO: 10 X10E3/UL (ref 3.4–10.8)

## 2022-01-07 ENCOUNTER — TRANSCRIBE ORDERS (OUTPATIENT)
Dept: ADMINISTRATIVE | Facility: HOSPITAL | Age: 62
End: 2022-01-07

## 2022-01-07 DIAGNOSIS — Z92.89 HISTORY OF MAMMOGRAPHY, SCREENING: Primary | ICD-10-CM

## 2022-01-07 LAB
CYTOLOGIST CVX/VAG CYTO: NORMAL
CYTOLOGY CVX/VAG DOC CYTO: NORMAL
CYTOLOGY CVX/VAG DOC THIN PREP: NORMAL
DX ICD CODE: NORMAL
HIV 1 & 2 AB SER-IMP: NORMAL
HPV I/H RISK 4 DNA CVX QL PROBE+SIG AMP: NEGATIVE
OTHER STN SPEC: NORMAL
PATH REPORT.FINAL DX SPEC: NORMAL
PATH REPORT.GROSS SPEC: NORMAL
PATH REPORT.SITE OF ORIGIN SPEC: NORMAL
PATHOLOGIST NAME: NORMAL
PAYMENT PROCEDURE: NORMAL
STAT OF ADQ CVX/VAG CYTO-IMP: NORMAL

## 2022-02-01 ENCOUNTER — TELEPHONE (OUTPATIENT)
Dept: NEUROSURGERY | Facility: CLINIC | Age: 62
End: 2022-02-01

## 2022-02-01 NOTE — TELEPHONE ENCOUNTER
Patient called to see if we received her scoliosis xray. I let her know that we received the results but we will still need an image CD. She stated that she will get one.

## 2022-03-04 RX ORDER — ESTRADIOL 0.05 MG/D
1 FILM, EXTENDED RELEASE TRANSDERMAL 2 TIMES WEEKLY
Qty: 24 PATCH | Refills: 0 | Status: SHIPPED | OUTPATIENT
Start: 2022-03-07 | End: 2022-05-15

## 2022-03-09 ENCOUNTER — OFFICE VISIT (OUTPATIENT)
Dept: CARDIOLOGY | Facility: CLINIC | Age: 62
End: 2022-03-09

## 2022-03-09 VITALS
SYSTOLIC BLOOD PRESSURE: 126 MMHG | OXYGEN SATURATION: 96 % | HEIGHT: 63 IN | RESPIRATION RATE: 16 BRPM | DIASTOLIC BLOOD PRESSURE: 80 MMHG | HEART RATE: 74 BPM | BODY MASS INDEX: 31.36 KG/M2 | WEIGHT: 177 LBS

## 2022-03-09 DIAGNOSIS — I47.1 SVT (SUPRAVENTRICULAR TACHYCARDIA): Primary | ICD-10-CM

## 2022-03-09 PROCEDURE — 99214 OFFICE O/P EST MOD 30 MIN: CPT | Performed by: NURSE PRACTITIONER

## 2022-03-09 NOTE — PROGRESS NOTES
"    CARDIOLOGY        Patient Name: Rosalinda Boone  :1960  Age: 61 y.o.  Primary Cardiologist: Aidan Samuels MD  Encounter Provider:  ANOOP Knowles    Date of Service: 21          CHIEF COMPLAINT / REASON FOR OFFICE VISIT     Rapid Heart Rate (6 month f/u)      HISTORY OF PRESENT ILLNESS       HPI    Rosalinda Boone is a 61 y.o. female who presents today for semi-annual evaluation.    Pt has a  history significant for palpitations, SVT.    Patient states that she has followed up with her GYN and had thyroid level and Vit D checked which were normal.  The GYN did make a small adjustment to her hormone medications.  Patient has not received the new patches from her pharmacy yet.  She states that she is sleeping better.  She states that palpitations are more frequent than they were in the past.  She states that she has an episode at least daily, some lasting longer than others.  She notes intermittent episodes were the heart is beating fast and hard.  She has taken an extra diltiazem intermittently and he has helped.  She is symptomatic and does have dyspnea and an occasional episode of lightheadedness.  Most episodes do not last for longer than an hour.      The following portions of the patient's history were reviewed and updated as appropriate: allergies, current medications, past family history, past medical history, past social history, past surgical history and problem list.      VITAL SIGNS     Visit Vitals  /80 (BP Location: Left arm, Patient Position: Sitting, Cuff Size: Large Adult)   Pulse 74   Resp 16   Ht 160 cm (62.99\")   Wt 80.3 kg (177 lb)   LMP  (LMP Unknown)   SpO2 96%   BMI 31.36 kg/m²         Wt Readings from Last 3 Encounters:   22 80.3 kg (177 lb)   22 79.7 kg (175 lb 9.6 oz)   21 79.8 kg (176 lb)     Body mass index is 31.36 kg/m².      REVIEW OF SYSTEMS   Review of Systems   Constitutional: Positive for malaise/fatigue. Negative for chills, fever, " weight gain and weight loss.   Cardiovascular: Positive for irregular heartbeat and palpitations. Negative for leg swelling.   Respiratory: Positive for shortness of breath. Negative for cough, snoring and wheezing.    Hematologic/Lymphatic: Negative for bleeding problem. Does not bruise/bleed easily.   Skin: Negative for color change.   Musculoskeletal: Negative for falls, joint pain and myalgias.   Gastrointestinal: Negative for abdominal pain and melena.   Genitourinary: Negative for hematuria.   Neurological: Negative for excessive daytime sleepiness.   Psychiatric/Behavioral: Negative for depression. The patient is not nervous/anxious.            PHYSICAL EXAMINATION     Vitals and nursing note reviewed.   Constitutional:       Appearance: Normal appearance. Well-developed.   Eyes:      Conjunctiva/sclera: Conjunctivae normal.   Neck:      Vascular: No carotid bruit.   Pulmonary:      Breath sounds: Normal breath sounds.   Cardiovascular:      Normal rate. Regular rhythm. Normal S1 with normal intensity. Normal S2 with normal intensity.      Murmurs: There is no murmur.      No gallop. No click. No rub.   Musculoskeletal: Normal range of motion. Skin:     General: Skin is warm and dry.   Neurological:      Mental Status: Alert and oriented to person, place, and time.      GCS: GCS eye subscore is 4. GCS verbal subscore is 5. GCS motor subscore is 6.   Psychiatric:         Speech: Speech normal.         Behavior: Behavior normal.         Thought Content: Thought content normal.         Judgment: Judgment normal.           REVIEWED DATA     Procedures      Cardiac Procedures:  1. 14 day Zio Patch starting on 12/22/17: 33 brief runs of SVT, with the longest being 20 beats in length at 153 bpm.  The fastest was 8 beats at 200 bpm.    2. Stress echocardiogram on 1/10/18: There was no evidence of ischemia.  The ejection fraction was 60-65%.  Diastolic function was normal.  The right ventricle was normal.  There was  no significant valvular disease.  3. Event recorder on 6/20/2018: There were several very brief runs of paroxysmal SVT, ranging from 6-8 beats in length.  PACs occurred occasionally.  Most of the episodes of skipped beats correlated very brief runs of paroxysmal SVT.  Occasional palpitations correlated to isolated PACs.  4. ZIO monitor 7/15/2021.  61 very brief runs of SVT.  Fastest lasting 6 beats in length, most episodes were less than 10 beats in length.  There was one episode of atrial tachycardia which lasted 40 seconds with average heart rate 119.  No evidence of atrial fibrillation.    Lipid Panel    Lipid Panel 7/28/21   Total Cholesterol 234 (A)   Triglycerides 161 (A)   HDL Cholesterol 68 (A)   VLDL Cholesterol 28   LDL Cholesterol  138 (A)   LDL/HDL Ratio 1.97   (A) Abnormal value       Comments are available for some flowsheets but are not being displayed.               ASSESSMENT & PLAN      Diagnosis Plan   1. SVT (supraventricular tachycardia) (HCC)  Holter Monitor - 72 Hour Up To 15 Days    Adult Transthoracic Echo Complete W/ Cont if Necessary Per Protocol    Ambulatory Referral to Cardiac Electrophysiology         SUMMARY/DISCUSSION  1. SVT/heart palpitations.  Patient is continuing to have episodes of heart palpitations and heart racing on a daily occurrence.  In the past these episodes have proven to be brief episodes of SVT.  She is anxious that the episodes could be from atrial fibrillation.  She reports that she is having an increase in frequency and states that these episodes occur almost daily for at least a few beats to an hour maximum.  I would like to reassess the SVT burden and also rule out atrial fibrillation.  We will place another ZIO monitor.  Also patient has symptoms of shortness of breath and sometimes lightheadedness with the episodes.  They are becoming more bothersome despite CCB.  I do not think that patient would do well on BB as she has some generalized fatigue already.  I  have recommended a referral with EP for further evaluation.  We will also get an echocardiogram to assess the structure and function of the heart.  Patient in agreement with plan.  She was advised that she can increase her morning dose of diltiazem if episodes continue to become more bothersome.  2. Follow-up to be arranged after above-mentioned orders.        MEDICATIONS         Discharge Medications          Accurate as of March 9, 2022  3:30 PM. If you have any questions, ask your nurse or doctor.            Continue These Medications      Instructions Start Date   Asmanex (30 Metered Doses) 110 MCG/INH inhaler  Generic drug: mometasone   1-2 puffs, Inhalation, Daily - RT, Can use twice a day if needed      azelastine 0.15 % solution nasal spray  Commonly known as: ASTEPRO   1 spray, Nasal, Daily PRN      Benefiber powder   1 packet, Oral, Daily      Biotin 69856 MCG tablet   1 tablet, Oral, Daily      calcium carb-cholecalciferol 600-800 MG-UNIT tablet   1 tablet, Oral, Daily      Culturelle Digestive Health capsule   No dose, route, or frequency recorded.      desloratadine 5 MG tablet  Commonly known as: CLARINEX   5 mg, Oral, Every Morning      dilTIAZem 60 MG tablet  Commonly known as: CARDIZEM   60 mg, Oral, 2 Times Daily      estradiol 0.05 MG/24HR patch  Commonly known as: MINIVELLE, VIVELLE-DOT   1 patch, Transdermal, 2 Times Weekly      estradiol 0.1 MG/GM vaginal cream  Commonly known as: ESTRACE   1 g, Vaginal, 2 Times Weekly      FDgard 25-20.75 MG capsule  Generic drug: [place] Oil-Levomenthol   2 capsules, Oral, 3 Times Daily PRN      FLUTICASONE PROPIONATE (NASAL) NA   1 spray, Nasal, Daily PRN      IBgard 90 MG capsule controlled-release  Generic drug: Peppermint Oil   2 capsules, Oral, 3 Times Daily PRN, IBgard       lansoprazole 30 MG capsule  Commonly known as: PREVACID   TAKE 1 CAPSULE TWICE A DAY      montelukast 10 MG tablet  Commonly known as: SINGULAIR   10 mg, Oral, Nightly       multivitamin tablet tablet  Commonly known as: THERAGRAN   2 tablets, Oral, Daily      ProAir  (90 Base) MCG/ACT inhaler  Generic drug: albuterol sulfate HFA   2 puffs, Inhalation, Every 4 Hours PRN      Unable to find   1 each, Once, Relief Factor Take one packet per day.       uribel 118 MG capsule capsule   1 capsule, Oral, 3 Times Daily PRN      Xiidra 5 % ophthalmic solution  Generic drug: Lifitegrast   1 drop, Both Eyes, 2 Times Daily                 **Dragon Disclaimer:   Much of this encounter note is an electronic transcription/translation of spoken language to printed text. The electronic translation of spoken language may permit erroneous, or at times, nonsensical words or phrases to be inadvertently transcribed. Although I have reviewed the note for such errors, some may still exist.

## 2022-03-24 ENCOUNTER — HOSPITAL ENCOUNTER (OUTPATIENT)
Dept: CARDIOLOGY | Facility: HOSPITAL | Age: 62
Discharge: HOME | End: 2022-03-24
Admitting: NURSE PRACTITIONER

## 2022-03-24 VITALS
BODY MASS INDEX: 31.36 KG/M2 | SYSTOLIC BLOOD PRESSURE: 132 MMHG | HEART RATE: 66 BPM | HEIGHT: 63 IN | WEIGHT: 177 LBS | DIASTOLIC BLOOD PRESSURE: 60 MMHG

## 2022-03-24 DIAGNOSIS — I47.1 SVT (SUPRAVENTRICULAR TACHYCARDIA): ICD-10-CM

## 2022-03-24 LAB
AORTIC ARCH: 2.8 CM
AORTIC DIMENSIONLESS INDEX: 0.7 (DI)
ASCENDING AORTA: 2.3 CM
BH CV ECHO MEAS - ACS: 1.43 CM
BH CV ECHO MEAS - AO MAX PG: 6.4 MMHG
BH CV ECHO MEAS - AO MEAN PG: 3.7 MMHG
BH CV ECHO MEAS - AO ROOT DIAM: 2.5 CM
BH CV ECHO MEAS - AO V2 MAX: 126.2 CM/SEC
BH CV ECHO MEAS - AO V2 VTI: 27.3 CM
BH CV ECHO MEAS - AVA(I,D): 2.24 CM2
BH CV ECHO MEAS - EDV(CUBED): 63.4 ML
BH CV ECHO MEAS - EDV(MOD-SP2): 92 ML
BH CV ECHO MEAS - EDV(MOD-SP4): 95 ML
BH CV ECHO MEAS - EF(MOD-BP): 62.9 %
BH CV ECHO MEAS - EF(MOD-SP2): 59.8 %
BH CV ECHO MEAS - EF(MOD-SP4): 64.2 %
BH CV ECHO MEAS - ESV(CUBED): 17.2 ML
BH CV ECHO MEAS - ESV(MOD-SP2): 37 ML
BH CV ECHO MEAS - ESV(MOD-SP4): 34 ML
BH CV ECHO MEAS - FS: 35.3 %
BH CV ECHO MEAS - IVS/LVPW: 1.23 CM
BH CV ECHO MEAS - IVSD: 1.05 CM
BH CV ECHO MEAS - LAT PEAK E' VEL: 11.3 CM/SEC
BH CV ECHO MEAS - LV DIASTOLIC VOL/BSA (35-75): 51.7 CM2
BH CV ECHO MEAS - LV MASS(C)D: 118.3 GRAMS
BH CV ECHO MEAS - LV MAX PG: 3.1 MMHG
BH CV ECHO MEAS - LV MEAN PG: 1.74 MMHG
BH CV ECHO MEAS - LV SYSTOLIC VOL/BSA (12-30): 18.5 CM2
BH CV ECHO MEAS - LV V1 MAX: 87.8 CM/SEC
BH CV ECHO MEAS - LV V1 VTI: 18.1 CM
BH CV ECHO MEAS - LVIDD: 4 CM
BH CV ECHO MEAS - LVIDS: 2.6 CM
BH CV ECHO MEAS - LVOT AREA: 3.4 CM2
BH CV ECHO MEAS - LVOT DIAM: 2.07 CM
BH CV ECHO MEAS - LVPWD: 0.86 CM
BH CV ECHO MEAS - MED PEAK E' VEL: 10.2 CM/SEC
BH CV ECHO MEAS - MV A DUR: 0.14 SEC
BH CV ECHO MEAS - MV A MAX VEL: 110.1 CM/SEC
BH CV ECHO MEAS - MV DEC SLOPE: 261.7 CM/SEC2
BH CV ECHO MEAS - MV DEC TIME: 0.2 MSEC
BH CV ECHO MEAS - MV E MAX VEL: 84 CM/SEC
BH CV ECHO MEAS - MV E/A: 0.76
BH CV ECHO MEAS - MV MAX PG: 3.5 MMHG
BH CV ECHO MEAS - MV MEAN PG: 1.98 MMHG
BH CV ECHO MEAS - MV V2 VTI: 28.1 CM
BH CV ECHO MEAS - MVA(VTI): 2.17 CM2
BH CV ECHO MEAS - PA ACC TIME: 0.12 SEC
BH CV ECHO MEAS - PA PR(ACCEL): 26.7 MMHG
BH CV ECHO MEAS - PA V2 MAX: 118.1 CM/SEC
BH CV ECHO MEAS - PULM A REVS DUR: 0.21 SEC
BH CV ECHO MEAS - PULM A REVS VEL: 26.1 CM/SEC
BH CV ECHO MEAS - PULM DIAS VEL: 48.8 CM/SEC
BH CV ECHO MEAS - PULM SYS VEL: 41.1 CM/SEC
BH CV ECHO MEAS - RAP SYSTOLE: 3 MMHG
BH CV ECHO MEAS - RV MAX PG: 2.12 MMHG
BH CV ECHO MEAS - RV V1 MAX: 72.8 CM/SEC
BH CV ECHO MEAS - RV V1 VTI: 16.6 CM
BH CV ECHO MEAS - RVOT DIAM: 2.1 CM
BH CV ECHO MEAS - RVSP: 24 MMHG
BH CV ECHO MEAS - SI(MOD-SP2): 30 ML/M2
BH CV ECHO MEAS - SI(MOD-SP4): 33.2 ML/M2
BH CV ECHO MEAS - SV(LVOT): 61 ML
BH CV ECHO MEAS - SV(MOD-SP2): 55 ML
BH CV ECHO MEAS - SV(MOD-SP4): 61 ML
BH CV ECHO MEAS - SV(RVOT): 57.9 ML
BH CV ECHO MEAS - TAPSE (>1.6): 2.5 CM
BH CV ECHO MEAS - TR MAX PG: 21 MMHG
BH CV ECHO MEAS - TR MAX VEL: 229.4 CM/SEC
BH CV ECHO MEASUREMENTS AVERAGE E/E' RATIO: 7.81
BH CV VAS BP LEFT ARM: NORMAL MMHG
BH CV XLRA - RV BASE: 2.43 CM
BH CV XLRA - RV LENGTH: 6.2 CM
BH CV XLRA - RV MID: 2.15 CM
BH CV XLRA - TDI S': 15.2 CM/SEC
LEFT ATRIUM VOLUME INDEX: 16.7 ML/M2
LV EF 2D ECHO EST: 65 %
MAXIMAL PREDICTED HEART RATE: 159 BPM
SINUS: 2.6 CM
STJ: 2.3 CM
STRESS TARGET HR: 135 BPM

## 2022-03-24 PROCEDURE — 93306 TTE W/DOPPLER COMPLETE: CPT

## 2022-03-24 PROCEDURE — 93306 TTE W/DOPPLER COMPLETE: CPT | Performed by: INTERNAL MEDICINE

## 2022-03-24 PROCEDURE — 25010000002 PERFLUTREN (DEFINITY) 8.476 MG IN SODIUM CHLORIDE (PF) 0.9 % 10 ML INJECTION: Performed by: NURSE PRACTITIONER

## 2022-03-24 RX ORDER — ESTRADIOL 0.1 MG/G
1 CREAM VAGINAL 2 TIMES WEEKLY
Qty: 42.5 G | Refills: 4 | Status: SHIPPED | OUTPATIENT
Start: 2022-03-24 | End: 2023-01-11 | Stop reason: SDUPTHER

## 2022-03-24 RX ADMIN — PERFLUTREN 2 ML: 6.52 INJECTION, SUSPENSION INTRAVENOUS at 13:55

## 2022-03-25 NOTE — PROGRESS NOTES
Reviewed results with Rosalinda Boone and patient verbalized understanding.    Thank you,  Chiquita White RN  Triage Nurse MG

## 2022-04-21 ENCOUNTER — OFFICE VISIT (OUTPATIENT)
Dept: CARDIOLOGY | Facility: CLINIC | Age: 62
End: 2022-04-21

## 2022-04-21 VITALS
HEART RATE: 76 BPM | HEIGHT: 63 IN | WEIGHT: 173 LBS | DIASTOLIC BLOOD PRESSURE: 86 MMHG | BODY MASS INDEX: 30.65 KG/M2 | SYSTOLIC BLOOD PRESSURE: 118 MMHG

## 2022-04-21 DIAGNOSIS — I48.0 AF (PAROXYSMAL ATRIAL FIBRILLATION): Primary | ICD-10-CM

## 2022-04-21 DIAGNOSIS — I49.1 PREMATURE ATRIAL CONTRACTIONS: ICD-10-CM

## 2022-04-21 PROCEDURE — 93000 ELECTROCARDIOGRAM COMPLETE: CPT | Performed by: INTERNAL MEDICINE

## 2022-04-21 PROCEDURE — 99203 OFFICE O/P NEW LOW 30 MIN: CPT | Performed by: INTERNAL MEDICINE

## 2022-04-21 NOTE — PROGRESS NOTES
Date of Office Visit: 2022  Encounter Provider: Rogelio Hayward MD  Place of Service: [facility] CARDIOLOGY  Patient Name: Rosalinda Boone  :1960    Chief Complaint   Patient presents with   • Rapid Heart Rate     New Patient Consult/ GM ref   :     HPI: Rosalinda Boone is a 62 y.o. female who presents today for palpitations    Symptoms have been present for years, feels that they started with menopause    She feels that symptoms lasted up to an hour.  It seemed like it was fast for an hour, but she also was having anxiety about the issue    Most episodes are 5 minutes are less.    No clear provacative factors     She has not been tested for sleep apnea          Past Medical History:   Diagnosis Date   • Arthritis     HANDS, BACK , FEET, NECK   • Asthma    • Bladder spasm    • Gastritis    • GERD (gastroesophageal reflux disease)    • IBS (irritable bowel syndrome)    • Migraine    • Palpitations     lov cardiology 2020   • Panic attacks    • Panic attacks    • PONV (postoperative nausea and vomiting)    • Premature atrial contractions    • Right sided abdominal pain    • S/P D&C (status post dilation and curettage) 3/3/2020   • Scoliosis    • SOB (shortness of breath)    • Spotting     SCHEDULED FOR D&C   • Status post laparoscopic hysterectomy 2020   • SVT (supraventricular tachycardia) (HCC)     14 day Zio Patch starting on 17: 33 brief runs of SVT (longest 20 beats in length at 153 bpm).    • Tingling     in hands    • Urinary tract infection        Past Surgical History:   Procedure Laterality Date   • COLONOSCOPY N/A 2016    [facility],    • D & C HYSTEROSCOPY N/A 3/3/2020    Procedure: DILATATION AND CURETTAGE HYSTEROSCOPY, possible myosure;  Surgeon: Giana Paul MD;  Location: [facility];  Service: Obstetrics/Gynecology;  Laterality: N/A;  DILATION AND CURETTAGE HYSTEROSCOPY   • D & C WITH SUCTION      for DUB   • ENDOSCOPY N/A 2016    z  line regular, 40 cm from incisors,  granular gastric mucosa, chronic gastritis   • LASIK     • OOPHORECTOMY     • TOTAL LAPAROSCOPIC HYSTERECTOMY N/A 11/17/2020    Procedure: TOTAL LAPAROSCOPIC HYSTERECTOMY with bilateral salpingoophorectomy;  Surgeon: Giana Paul MD;  Location: [facility];  Service: Obstetrics/Gynecology;  Laterality: N/A;       Social History     Socioeconomic History   • Marital status:      Spouse name: MATTIE ALMARAZ   • Number of children: 2   Tobacco Use   • Smoking status: Never Smoker   • Smokeless tobacco: Never Used   Vaping Use   • Vaping Use: Never used   Substance and Sexual Activity   • Alcohol use: Yes     Alcohol/week: 14.0 standard drinks     Types: 14 Glasses of wine per week     Comment: 2 glasses/nightly with dinner   • Drug use: No   • Sexual activity: Yes     Partners: Male     Birth control/protection: Post-menopausal, Surgical     Comment: TLH BSO for recurrent  VB       Family History   Problem Relation Age of Onset   • Cancer Father 63        lung-smoker   • Aneurysm Mother 73        AAA   • Scleroderma Maternal Aunt    • Leukemia Maternal Grandmother    • Aneurysm Maternal Grandfather         Cerebral   • Breast cancer Neg Hx    • Ovarian cancer Neg Hx    • Colon cancer Neg Hx    • Deep vein thrombosis Neg Hx    • Pulmonary embolism Neg Hx    • Malig Hyperthermia Neg Hx        Review of Systems   Constitutional: Negative.   Cardiovascular: Positive for palpitations.   Respiratory: Negative.    Gastrointestinal: Negative.        Allergies   Allergen Reactions   • Codeine Dizziness   • Levaquin [Levofloxacin] GI Intolerance     FACIAL NUMBNESS   • Qvar [Beclomethasone] GI Intolerance     Tingling on one side of face   • Sulfa Antibiotics Hives   • Erythromycin Diarrhea   • Keflex [Cephalexin] Rash         Current Outpatient Medications:   •  ASMANEX, 30 METERED DOSES, 110 MCG/INH inhaler, Inhale 1-2 puffs Daily. Can use twice a day if needed, Disp: ,  Rfl:   •  azelastine (ASTEPRO) 0.15 % solution nasal spray, 1 spray into the nostril(s) as directed by provider Daily As Needed., Disp: , Rfl:   •  Biotin 77555 MCG tablet, Take 1 tablet by mouth Daily., Disp: , Rfl:   •  calcium carb-cholecalciferol 600-800 MG-UNIT tablet, Take 1 tablet by mouth Daily., Disp: , Rfl:   •  Estelita Oil-Levomenthol (FDgard) 25-20.75 MG capsule, Take 2 capsules by mouth 3 (Three) Times a Day As Needed (stomach issues)., Disp: , Rfl:   •  desloratadine (CLARINEX) 5 MG tablet, Take 5 mg by mouth Every Morning., Disp: , Rfl:   •  dilTIAZem (CARDIZEM) 60 MG tablet, Take 1 tablet by mouth 2 (Two) Times a Day., Disp: 180 tablet, Rfl: 3  •  estradiol (ESTRACE) 0.1 MG/GM vaginal cream, Insert 1 g into the vagina 2 (Two) Times a Week., Disp: 42.5 g, Rfl: 4  •  estradiol (MINIVELLE, VIVELLE-DOT) 0.05 MG/24HR patch, Place 1 patch on the skin as directed by provider 2 (Two) Times a Week., Disp: 24 patch, Rfl: 0  •  FLUTICASONE PROPIONATE, NASAL, NA, 1 spray into the nostril(s) as directed by provider Daily As Needed., Disp: , Rfl:   •  Lactobacillus-Inulin ([facility]) capsule, , Disp: , Rfl:   •  lansoprazole (PREVACID) 30 MG capsule, TAKE 1 CAPSULE TWICE A DAY, Disp: 180 capsule, Rfl: 2  •  montelukast (SINGULAIR) 10 MG tablet, Take 10 mg by mouth Every Night., Disp: , Rfl:   •  Multiple Vitamin (MULTI VITAMIN DAILY PO), Take 2 tablets by mouth Daily., Disp: , Rfl:   •  Peppermint Oil (IBgard) 90 MG capsule controlled-release, Take 2 capsules by mouth 3 (Three) Times a Day As Needed (stomach issues). IBgard, Disp: , Rfl:   •  PROAIR  (90 BASE) MCG/ACT inhaler, Inhale 2 puffs Every 4 (Four) Hours As Needed., Disp: , Rfl:   •  Unable to find, 1 each 1 (One) Time. Relief Factor Take one packet per day., Disp: , Rfl:   •  uribel (URO-MP) 118 MG capsule capsule, Take 1 capsule by mouth 3 (Three) Times a Day As Needed., Disp: , Rfl:   •  Wheat Dextrin (BENEFIBER) powder, Take 1  "packet by mouth Daily., Disp: , Rfl:   •  XIIDRA 5 % solution, Administer 1 drop to both eyes 2 (Two) Times a Day., Disp: , Rfl:       Objective:     Vitals:    04/21/22 1316   BP: 118/86   Pulse: 76   Weight: 78.5 kg (173 lb)   Height: 160 cm (63\")     Body mass index is 30.65 kg/m².    PHYSICAL EXAM:    Vitals and nursing note reviewed.   Constitutional:       Appearance: Healthy appearance.   HENT:    Mouth/Throat:      Pharynx: Oropharynx is clear.   Pulmonary:      Effort: Pulmonary effort is normal.      Breath sounds: Normal breath sounds.   Cardiovascular:      PMI at left midclavicular line. Normal rate. Regular rhythm.   Edema:     Peripheral edema absent.   Skin:     General: Skin is warm.   Neurological:      General: No focal deficit present.      Mental Status: Alert, oriented to person, place, and time and oriented to person, place and time.   Psychiatric:         Attention and Perception: Attention and perception normal.         Mood and Affect: Mood and affect normal.             ECG 12 Lead    Date/Time: 4/21/2022 1:39 PM  Performed by: Rogelio Hayward MD  Authorized by: Rogelio Hayward MD   Comparison: compared with previous ECG   Rhythm: sinus rhythm        Holter monitor shows PACS and infrequent atrial tachycardia      Assessment:       Diagnosis Plan   1. AF (paroxysmal atrial fibrillation) ([facility])  ECG 12 Lead    Ambulatory Referral to Sleep Medicine   2. Premature atrial contractions            Plan:       I reviewed her Holter monitor.  I think she has premature atrial contractions, and some brief runs of atrial tachycardia.  I would characterize this as preparoxysmal atrial fibrillation.  To be clear I do not see any clear paroxysmal atrial fibrillation or think she needs anticoagulation.  I do not think that this is in the believable rhythm at this point.  I discussed lifestyle management with her including screening for sleep apnea which we are going to order, as well as weight " loss and minimization of alcohol.  She expressed understanding.  Offered reassurance to the patient.  She will follow-up if symptoms worsen.    As always, it has been a pleasure to participate in your patient's care.      Sincerely,         Rogeilo Hayward MD

## 2022-05-11 DIAGNOSIS — K21.9 GASTROESOPHAGEAL REFLUX DISEASE WITHOUT ESOPHAGITIS: ICD-10-CM

## 2022-05-12 RX ORDER — LANSOPRAZOLE 30 MG/1
CAPSULE, DELAYED RELEASE ORAL
Qty: 180 CAPSULE | Refills: 3 | Status: SHIPPED | OUTPATIENT
Start: 2022-05-12

## 2022-05-15 RX ORDER — ESTRADIOL 0.05 MG/D
FILM, EXTENDED RELEASE TRANSDERMAL
Qty: 24 PATCH | Refills: 1 | Status: SHIPPED | OUTPATIENT
Start: 2022-05-15 | End: 2022-10-17

## 2022-06-17 ENCOUNTER — OFFICE VISIT (OUTPATIENT)
Dept: SLEEP MEDICINE | Facility: HOSPITAL | Age: 62
End: 2022-06-17

## 2022-06-17 VITALS
WEIGHT: 174 LBS | HEART RATE: 78 BPM | OXYGEN SATURATION: 98 % | BODY MASS INDEX: 30.83 KG/M2 | DIASTOLIC BLOOD PRESSURE: 68 MMHG | HEIGHT: 63 IN | SYSTOLIC BLOOD PRESSURE: 113 MMHG

## 2022-06-17 DIAGNOSIS — I48.0 AF (PAROXYSMAL ATRIAL FIBRILLATION): ICD-10-CM

## 2022-06-17 DIAGNOSIS — K21.9 GASTROESOPHAGEAL REFLUX DISEASE, UNSPECIFIED WHETHER ESOPHAGITIS PRESENT: ICD-10-CM

## 2022-06-17 DIAGNOSIS — G47.10 HYPERSOMNOLENCE: Primary | ICD-10-CM

## 2022-06-17 DIAGNOSIS — G47.00 INSOMNIA, UNSPECIFIED TYPE: ICD-10-CM

## 2022-06-17 DIAGNOSIS — G47.33 OBSTRUCTIVE SLEEP APNEA: ICD-10-CM

## 2022-06-17 DIAGNOSIS — I49.9 CARDIAC ARRHYTHMIA, UNSPECIFIED CARDIAC ARRHYTHMIA TYPE: ICD-10-CM

## 2022-06-17 PROCEDURE — G0463 HOSPITAL OUTPT CLINIC VISIT: HCPCS

## 2022-06-17 NOTE — PROGRESS NOTES
[facility] SLEEP MEDICINE  [address]  [address]  [place] [address] [phone]    Referring Physician: Dr. Rogelio Hayward  PCP: Shraddha Sosa MD    Reason for consult:  Sleep disorders of having cardiac arrhythmias    Rosalinda Boone is a 62 y.o.female was seen in the [facility] today. Having cardiac arrhythmias. She sleeps from 10pm to 5am. She wakes up fatigued. She has severe gerd, sleeps propped up. Also IBS. She has several arousals each night, sometimes she can go back to sleep. She has some snoring.  [place] Sleepiness Score: 8. Caffeine intake 3 per day. Alcohol intake 0 per week.    Rosalinda Boone  has a past medical history of Arthritis, Asthma, Bladder spasm, Gastritis, GERD (gastroesophageal reflux disease), IBS (irritable bowel syndrome), Migraine, Palpitations, Panic attacks, Panic attacks, PONV (postoperative nausea and vomiting), Premature atrial contractions, Right sided abdominal pain, S/P D&C (status post dilation and curettage) (3/3/2020), Scoliosis, SOB (shortness of breath), Spotting, Status post laparoscopic hysterectomy (11/17/2020), SVT (supraventricular tachycardia) ([facility]), Tingling, and Urinary tract infection.     Current Medications:    Current Outpatient Medications:   •  ASMANEX, 30 METERED DOSES, 110 MCG/INH inhaler, Inhale 1-2 puffs Daily. Can use twice a day if needed, Disp: , Rfl:   •  azelastine (ASTEPRO) 0.15 % solution nasal spray, 1 spray into the nostril(s) as directed by provider Daily As Needed., Disp: , Rfl:   •  Biotin 70473 MCG tablet, Take 1 tablet by mouth Daily., Disp: , Rfl:   •  calcium carb-cholecalciferol 600-800 MG-UNIT tablet, Take 1 tablet by mouth Daily., Disp: , Rfl:   •  Gove Oil-Levomenthol (FDgard) 25-20.75 MG capsule, Take 2 capsules by mouth 3 (Three) Times a Day As Needed (stomach issues)., Disp: , Rfl:   •  desloratadine (CLARINEX) 5 MG tablet, Take 5 mg by mouth Every Morning., Disp: , Rfl:   •  dilTIAZem (CARDIZEM)  60 MG tablet, Take 1 tablet by mouth 2 (Two) Times a Day., Disp: 180 tablet, Rfl: 3  •  estradiol (ESTRACE) 0.1 MG/GM vaginal cream, Insert 1 g into the vagina 2 (Two) Times a Week., Disp: 42.5 g, Rfl: 4  •  estradiol (MINIVELLE, VIVELLE-DOT) 0.05 MG/24HR patch, APPLY 1 PATCH ON THE SKIN AS DIRECTED BY PROVIDER TWO TIMES A WEEK, Disp: 24 patch, Rfl: 1  •  FLUTICASONE PROPIONATE, NASAL, NA, 1 spray into the nostril(s) as directed by provider Daily As Needed., Disp: , Rfl:   •  Lactobacillus-Inulin ([facility]) capsule, , Disp: , Rfl:   •  lansoprazole (PREVACID) 30 MG capsule, TAKE 1 CAPSULE TWICE A DAY, Disp: 180 capsule, Rfl: 3  •  montelukast (SINGULAIR) 10 MG tablet, Take 10 mg by mouth Every Night., Disp: , Rfl:   •  Multiple Vitamin (MULTI VITAMIN DAILY PO), Take 2 tablets by mouth Daily., Disp: , Rfl:   •  Peppermint Oil (IBgard) 90 MG capsule controlled-release, Take 2 capsules by mouth 3 (Three) Times a Day As Needed (stomach issues). IBgard, Disp: , Rfl:   •  PROAIR  (90 BASE) MCG/ACT inhaler, Inhale 2 puffs Every 4 (Four) Hours As Needed., Disp: , Rfl:   •  Unable to find, 1 each 1 (One) Time. Relief Factor Take one packet per day., Disp: , Rfl:   •  uribel (URO-MP) 118 MG capsule capsule, Take 1 capsule by mouth 3 (Three) Times a Day As Needed., Disp: , Rfl:   •  Wheat Dextrin (BENEFIBER) powder, Take 1 packet by mouth Daily., Disp: , Rfl:   •  XIIDRA 5 % solution, Administer 1 drop to both eyes 2 (Two) Times a Day., Disp: , Rfl:    also listed in Sleep Questionnaire.    FH: family history includes Aneurysm in her maternal grandfather; Aneurysm (age of onset: 73) in her mother; Cancer (age of onset: 63) in her father; Leukemia in her maternal grandmother; Scleroderma in her maternal aunt.  SH:  reports that she has never smoked. She has never used smokeless tobacco. She reports current alcohol use of about 14.0 standard drinks of alcohol per week. She reports that she does not use  "drugs.    Pertinent Positive Review of Systems of heartburn, pnd, soa, irregular heartbeat, anxiety, arthritis  Rest of Review of Systems was negative as recorded in Sleep Questionnaire.        Vital Signs: /68   Pulse 78   Ht 160 cm (63\")   Wt 78.9 kg (174 lb)   LMP  (LMP Unknown)   SpO2 98%   BMI 30.82 kg/m²     Body mass index is 30.82 kg/m².       Tongue: Normal       Dentition: good       Pharynx: Posterior pharyngeal pillars are narrow   Mallampatti: III (soft and hard palate and base of uvula visible)        General: Alert. Cooperative. Well developed. No acute distress.             Head:  Normocephalic. Symmetrical. Atraumatic.              Eyes: Sclera clear. No icterus. PERRLA. Normal EOM.             Ears: No deformities. Normal hearing.             Nose: No septal deviation. No drainage.          Throat: No oral lesions. No thrush. Moist mucous membranes.    Chest Wall:  Normal shape. Symmetric expansion with respiration. No tenderness.             Neck:  Trachea midline.           Lungs:  Clear to auscultation bilaterally. No wheezes. No rhonchi. No rales. Respirations regular, even and unlabored.            Heart:  Regular rhythm and normal rate. Normal S1 and S2. No murmur.     Abdomen:  Soft, non-tender and non-distended. Normal bowel sounds. No masses.  Extremities:  Moves all extremities well. No edema.           Pulses: Pulses palpable and equal bilaterally.               Skin: Dry. Intact. No bleeding. No rash.           Neuro: Moves all 4 extremities and cranial nerves grossly intact.  Psychiatric: Normal mood and affect.    Impression:  1. Hypersomnolence    2. Insomnia, unspecified type    3. Cardiac arrhythmia, unspecified cardiac arrhythmia type    4. Gastroesophageal reflux disease, unspecified whether esophagitis present    5. Obstructive sleep apnea    6. AF (paroxysmal atrial fibrillation) ([facility])          Orders Placed This Encounter   Procedures   • Home Sleep Study     " Standing Status:   Future     Standing Expiration Date:   6/17/2023     Scheduling Instructions:      Ask patient to sleep on back as much as possible on night of study     Order Specific Question:   May take own meds     Answer:   Yes     Order Specific Question:   Details     Answer:   O2 Implementation per Protocol            Plan:  Rosalinda requires further testing for sleep disordered breathing.  I will start with HST.  Explained that she may need in lab study.  She also has severe GERD and this could be contributing to some of her sleep problems.  She sleeps propped up.  This may affect the results of the sleep study.    This patient has typical features of obstructive sleep apnea with hypersomnolence snoring and apneas along with elevated BMI and classic oropharyngeal structure.  Likelihood of obstructive sleep apnea is high and a polysomnogram study will therefore be requested.      Possible diagnosis and pathophysiology of obstructive sleep apnea was discussed with the patient.  Health risks of untreated obstructive sleep apnea including cardiovascular risks were discussed.  Patient was cautioned about activities requiring full concentration especially driving at night or for longer distances, and until hypersomnolence is corrected.    Results of sleep testing will be reviewed to see if patient is a candidate for CPAP machine.  Alternatives to therapy include oral mandibular advancing device (OMAD) were discussed. However OMAD is usually only beneficial in mild obstructive sleep apnea.  The benefit of weight loss in reducing severity of obstructive sleep apnea was discussed.  Patient would benefit from adhering to a strict diet to achieve ideal BMI.     Patient will follow up in this clinic after study    Thank you for allowing me to participate in your patient's care.    Electronically signed by Brodie Gabriel MD, 06/17/22, 2:14 PM EDT.    Part of this note may be an electronic transcription/translation of  spoken language to printed text using the Dragon Dictation System.

## 2022-06-27 ENCOUNTER — PATIENT MESSAGE (OUTPATIENT)
Dept: CARDIOLOGY | Facility: CLINIC | Age: 62
End: 2022-06-27

## 2022-06-27 RX ORDER — DILTIAZEM HYDROCHLORIDE 60 MG/1
60 TABLET, FILM COATED ORAL 2 TIMES DAILY
Qty: 28 TABLET | Refills: 0 | Status: SHIPPED | OUTPATIENT
Start: 2022-06-27 | End: 2022-06-27 | Stop reason: SDUPTHER

## 2022-06-27 RX ORDER — DILTIAZEM HYDROCHLORIDE 60 MG/1
60 TABLET, FILM COATED ORAL 2 TIMES DAILY
Qty: 180 TABLET | Refills: 3 | Status: SHIPPED | OUTPATIENT
Start: 2022-06-27 | End: 2022-08-25 | Stop reason: SDUPTHER

## 2022-06-27 NOTE — TELEPHONE ENCOUNTER
----- Message from Rosalinda Paolo sent at 2022  9:26 AM EDT -----  Regarding: Medication refill needed for Diliatizem  Sang Flores,  My name is Rosalinda Boone [phone]/  [birth year].  I am a patient with Dr. Ashford.  I have accidentally allowed my [facility] to  for diliatizem and did not realize that until this weekend.  (I have only one and 1/2 days of med left/ Ithought the full bottle I had in a drawer was the correct med when in fact it was another med.  Totally stupid mistake on my part!)  I would like to request that a small refill of diliatizem be sent to [facility] in [place] [phone]  while I wait for [facility] and also that a renewal 90-day prescription be sent to [facility].  I truly appreciate your help and apologize for the inconvenience!  I also left a voice message at your office this morning re/this request.  Thanks again!

## 2022-07-01 ENCOUNTER — APPOINTMENT (OUTPATIENT)
Dept: MAMMOGRAPHY | Facility: HOSPITAL | Age: 62
End: 2022-07-01

## 2022-07-01 ENCOUNTER — APPOINTMENT (OUTPATIENT)
Dept: BONE DENSITY | Facility: HOSPITAL | Age: 62
End: 2022-07-01

## 2022-08-05 ENCOUNTER — HOSPITAL ENCOUNTER (OUTPATIENT)
Dept: SLEEP MEDICINE | Facility: HOSPITAL | Age: 62
Discharge: HOME | End: 2022-08-05
Admitting: INTERNAL MEDICINE

## 2022-08-05 DIAGNOSIS — G47.33 OBSTRUCTIVE SLEEP APNEA: ICD-10-CM

## 2022-08-05 PROCEDURE — 95806 SLEEP STUDY UNATT&RESP EFFT: CPT

## 2022-08-25 RX ORDER — DILTIAZEM HYDROCHLORIDE 60 MG/1
TABLET, FILM COATED ORAL
Qty: 270 TABLET | Refills: 3 | Status: SHIPPED | OUTPATIENT
Start: 2022-08-25

## 2022-10-17 RX ORDER — ESTRADIOL 0.05 MG/D
FILM, EXTENDED RELEASE TRANSDERMAL
Qty: 24 PATCH | Refills: 0 | Status: SHIPPED | OUTPATIENT
Start: 2022-10-17 | End: 2023-01-11 | Stop reason: SDUPTHER

## 2022-10-21 ENCOUNTER — HOSPITAL ENCOUNTER (OUTPATIENT)
Dept: MAMMOGRAPHY | Facility: HOSPITAL | Age: 62
Discharge: HOME | End: 2022-10-21

## 2022-10-21 ENCOUNTER — HOSPITAL ENCOUNTER (OUTPATIENT)
Dept: BONE DENSITY | Facility: HOSPITAL | Age: 62
Discharge: HOME | End: 2022-10-21

## 2022-10-21 DIAGNOSIS — Z92.89 HISTORY OF MAMMOGRAPHY, SCREENING: ICD-10-CM

## 2022-10-21 DIAGNOSIS — Z13.820 SCREENING FOR OSTEOPOROSIS: ICD-10-CM

## 2022-10-21 PROCEDURE — 77063 BREAST TOMOSYNTHESIS BI: CPT

## 2022-10-21 PROCEDURE — 77067 SCR MAMMO BI INCL CAD: CPT

## 2022-10-21 PROCEDURE — 77080 DXA BONE DENSITY AXIAL: CPT

## 2022-10-25 NOTE — PROGRESS NOTES
PIP= PIP= DEXA shows osteopenia, however, she  does not meet the criteria for treatment with osteoporosis medication. Her values have improved slightly. Rec daily calcium and vit D intake along with weight bearing exercises. Repeat DEXA in 2-3 years.

## 2022-11-02 DIAGNOSIS — Z00.00 ANNUAL PHYSICAL EXAM: Primary | ICD-10-CM

## 2022-11-04 LAB
ALBUMIN SERPL-MCNC: 4.2 G/DL (ref 3.5–5.2)
ALBUMIN/GLOB SERPL: 1.9 G/DL
ALP SERPL-CCNC: 62 U/L (ref 39–117)
ALT SERPL-CCNC: 12 U/L (ref 1–33)
AST SERPL-CCNC: 19 U/L (ref 1–32)
BILIRUB SERPL-MCNC: 0.2 MG/DL (ref 0–1.2)
BUN SERPL-MCNC: 11 MG/DL (ref 8–23)
BUN/CREAT SERPL: 14.7 (ref 7–25)
CALCIUM SERPL-MCNC: 8.9 MG/DL (ref 8.6–10.5)
CHLORIDE SERPL-SCNC: 102 MMOL/L (ref 98–107)
CHOLEST SERPL-MCNC: 236 MG/DL (ref 0–200)
CO2 SERPL-SCNC: 25.6 MMOL/L (ref 22–29)
CREAT SERPL-MCNC: 0.75 MG/DL (ref 0.57–1)
EGFRCR SERPLBLD CKD-EPI 2021: 90.1 ML/MIN/1.73
ERYTHROCYTE [DISTWIDTH] IN BLOOD BY AUTOMATED COUNT: 11.8 % (ref 12.3–15.4)
GLOBULIN SER CALC-MCNC: 2.2 GM/DL
GLUCOSE SERPL-MCNC: 90 MG/DL (ref 65–99)
HCT VFR BLD AUTO: 40.8 % (ref 34–46.6)
HDLC SERPL-MCNC: 88 MG/DL (ref 40–60)
HGB BLD-MCNC: 13.7 G/DL (ref 12–15.9)
LDLC SERPL CALC-MCNC: 119 MG/DL (ref 0–100)
LDLC/HDLC SERPL: 1.3 {RATIO}
MCH RBC QN AUTO: 32.1 PG (ref 26.6–33)
MCHC RBC AUTO-ENTMCNC: 33.6 G/DL (ref 31.5–35.7)
MCV RBC AUTO: 95.6 FL (ref 79–97)
PLATELET # BLD AUTO: 297 10*3/MM3 (ref 140–450)
POTASSIUM SERPL-SCNC: 4.1 MMOL/L (ref 3.5–5.2)
PROT SERPL-MCNC: 6.4 G/DL (ref 6–8.5)
RBC # BLD AUTO: 4.27 10*6/MM3 (ref 3.77–5.28)
SODIUM SERPL-SCNC: 137 MMOL/L (ref 136–145)
TRIGL SERPL-MCNC: 168 MG/DL (ref 0–150)
TSH SERPL DL<=0.005 MIU/L-ACNC: 2.03 UIU/ML (ref 0.45–4.5)
VLDLC SERPL CALC-MCNC: 29 MG/DL (ref 5–40)
WBC # BLD AUTO: 7.45 10*3/MM3 (ref 3.4–10.8)

## 2022-11-07 ENCOUNTER — DOCUMENTATION (OUTPATIENT)
Dept: SLEEP MEDICINE | Facility: HOSPITAL | Age: 62
End: 2022-11-07

## 2022-11-07 NOTE — PROGRESS NOTES
Overnight oximetry done 10/24/2022:  No significant desaturation with CPAP device    Electronically signed by Brodie Gabriel MD, 11/07/22, 2:43 PM EST.

## 2022-11-10 ENCOUNTER — OFFICE VISIT (OUTPATIENT)
Dept: INTERNAL MEDICINE | Facility: CLINIC | Age: 62
End: 2022-11-10

## 2022-11-10 VITALS
SYSTOLIC BLOOD PRESSURE: 122 MMHG | TEMPERATURE: 97.5 F | OXYGEN SATURATION: 96 % | BODY MASS INDEX: 28.99 KG/M2 | WEIGHT: 174 LBS | RESPIRATION RATE: 16 BRPM | HEIGHT: 65 IN | HEART RATE: 66 BPM | DIASTOLIC BLOOD PRESSURE: 78 MMHG

## 2022-11-10 DIAGNOSIS — E78.5 HYPERLIPIDEMIA, UNSPECIFIED HYPERLIPIDEMIA TYPE: ICD-10-CM

## 2022-11-10 DIAGNOSIS — Z00.00 HEALTH CARE MAINTENANCE: Primary | ICD-10-CM

## 2022-11-10 DIAGNOSIS — K21.9 GASTROESOPHAGEAL REFLUX DISEASE WITHOUT ESOPHAGITIS: ICD-10-CM

## 2022-11-10 PROCEDURE — 99396 PREV VISIT EST AGE 40-64: CPT | Performed by: INTERNAL MEDICINE

## 2022-11-10 NOTE — PROGRESS NOTES
Subjective   Rosalinda Almaraz is a 62 y.o. female and is here for a comprehensive physical exam. The patient reports problems - ruiz.  She does not think the cpap has helped her though the oxygen level is better but she does not feel better    Pt is UTD with annual gyn exam and mammo     Do you take any herbs or supplements that were not prescribed by a doctor?       Social History: no tob no etoh  Social History     Socioeconomic History   • Marital status:      Spouse name: MATTIE ALMARAZ   • Number of children: 2   Tobacco Use   • Smoking status: Never   • Smokeless tobacco: Never   Vaping Use   • Vaping Use: Never used   Substance and Sexual Activity   • Alcohol use: Yes     Alcohol/week: 14.0 standard drinks     Types: 14 Glasses of wine per week     Comment: 2 glasses/nightly with dinner   • Drug use: No   • Sexual activity: Yes     Partners: Male     Birth control/protection: Post-menopausal, Surgical     Comment: [facility]SO for recurrent  VB       Family History:   Family History   Problem Relation Age of Onset   • Cancer Father 63        lung-smoker   • Aneurysm Mother 73        AAA   • Scleroderma Maternal Aunt    • Leukemia Maternal Grandmother    • Aneurysm Maternal Grandfather         Cerebral   • Breast cancer Neg Hx    • Ovarian cancer Neg Hx    • Colon cancer Neg Hx    • Deep vein thrombosis Neg Hx    • Pulmonary embolism Neg Hx    • Malig Hyperthermia Neg Hx        Past Medical History:   Past Medical History:   Diagnosis Date   • Arthritis     HANDS, BACK , FEET, NECK   • Asthma    • Bladder spasm    • Gastritis    • GERD (gastroesophageal reflux disease)    • IBS (irritable bowel syndrome)    • Migraine    • Palpitations     lov cardiology 7/2020   • Panic attacks    • Panic attacks    • PONV (postoperative nausea and vomiting)    • Premature atrial contractions    • Right sided abdominal pain    • S/P D&C (status post dilation and curettage) 03/03/2020   • Scoliosis    • Sleep apnea 09/16/2022  "  • SOB (shortness of breath)    • Spotting     SCHEDULED FOR D&C   • Status post laparoscopic hysterectomy 11/17/2020   • SVT (supraventricular tachycardia) (HCC)     14 day Zio Patch starting on 12/22/17: 33 brief runs of SVT (longest 20 beats in length at 153 bpm).    • Tingling     in hands    • Urinary tract infection            Review of Systems    A comprehensive review of systems was negative.    Objective   /78 (BP Location: Right arm, Patient Position: Sitting, Cuff Size: Adult)   Pulse 66   Temp 97.5 °F (36.4 °C) (Temporal)   Resp 16   Ht 165.1 cm (65\")   Wt 78.9 kg (174 lb)   LMP  (LMP Unknown)   SpO2 96%   BMI 28.96 kg/m²     General Appearance:    Alert, cooperative, no distress, appears stated age   Head:    Normocephalic, without obvious abnormality, atraumatic   Eyes:    PERRL, conjunctiva/corneas clear, EOM's intact, fundi     benign, both eyes   Ears:    Normal TM's and external ear canals, both ears   Nose:   Nares normal, septum midline, mucosa normal, no drainage    or sinus tenderness   Throat:   Lips, mucosa, and tongue normal; teeth and gums normal   Neck:   Supple, symmetrical, trachea midline, no adenopathy;     thyroid:  no enlargement/tenderness/nodules; no carotid    bruit or JVD   Back:     Symmetric, no curvature, ROM normal, no CVA tenderness   Lungs:     Clear to auscultation bilaterally, respirations unlabored   Chest Wall:    No tenderness or deformity    Heart:    Regular rate and rhythm, S1 and S2 normal, no murmur, rub   or gallop       Abdomen:     Soft, non-tender, bowel sounds active all four quadrants,     no masses, no organomegaly           Extremities:   Extremities normal, atraumatic, no cyanosis or edema   Pulses:   2+ and symmetric all extremities   Skin:   Skin color, texture, turgor normal, no rashes or lesions   Lymph nodes:   Cervical, supraclavicular, and axillary nodes normal   Neurologic:   CNII-XII intact, normal strength, sensation and reflexes "     throughout       Vitals:    11/10/22 1326   BP: 122/78   Pulse: 66   Resp: 16   Temp: 97.5 °F (36.4 °C)   SpO2: 96%     Body mass index is 28.96 kg/m².      Medications:   Current Outpatient Medications:   •  ASMANEX, 30 METERED DOSES, 110 MCG/INH inhaler, Inhale 1-2 puffs Daily. Can use twice a day if needed, Disp: , Rfl:   •  azelastine (ASTEPRO) 0.15 % solution nasal spray, 1 spray into the nostril(s) as directed by provider Daily As Needed., Disp: , Rfl:   •  Biotin 22469 MCG tablet, Take 1 tablet by mouth Daily., Disp: , Rfl:   •  calcium carb-cholecalciferol 600-800 MG-UNIT tablet, Take 1 tablet by mouth Daily., Disp: , Rfl:   •  [place] Oil-Levomenthol (FDgard) 25-20.75 MG capsule, Take 2 capsules by mouth 3 (Three) Times a Day As Needed (stomach issues)., Disp: , Rfl:   •  desloratadine (CLARINEX) 5 MG tablet, Take 5 mg by mouth Every Morning., Disp: , Rfl:   •  dilTIAZem (CARDIZEM) 60 MG tablet, Take 2 tablets by mouth Daily AND 1 tablet Every Evening., Disp: 270 tablet, Rfl: 3  •  estradiol (ESTRACE) 0.1 MG/GM vaginal cream, Insert 1 g into the vagina 2 (Two) Times a Week., Disp: 42.5 g, Rfl: 4  •  estradiol (MINIVELLE, VIVELLE-DOT) 0.05 MG/24HR patch, APPLY 1 PATCH ON THE SKIN AS DIRECTED BY PROVIDER TWO TIMES A WEEK, Disp: 24 patch, Rfl: 0  •  FLUTICASONE PROPIONATE, NASAL, NA, 1 spray into the nostril(s) as directed by provider Daily As Needed., Disp: , Rfl:   •  lansoprazole (PREVACID) 30 MG capsule, TAKE 1 CAPSULE TWICE A DAY, Disp: 180 capsule, Rfl: 3  •  montelukast (SINGULAIR) 10 MG tablet, Take 10 mg by mouth Every Night., Disp: , Rfl:   •  Multiple Vitamin (MULTI VITAMIN DAILY PO), Take 2 tablets by mouth Daily., Disp: , Rfl:   •  Peppermint Oil (IBgard) 90 MG capsule controlled-release, Take 2 capsules by mouth 3 (Three) Times a Day As Needed (stomach issues). Hernán, Disp: , Rfl:   •  PROAIR  (90 BASE) MCG/ACT inhaler, Inhale 2 puffs Every 4 (Four) Hours As Needed., Disp: , Rfl:   •   Unable to find, 1 each 1 (One) Time. Relief Factor Take one packet per day., Disp: , Rfl:   •  uribel (URO-MP) 118 MG capsule capsule, Take 1 capsule by mouth 3 (Three) Times a Day As Needed., Disp: , Rfl:   •  Wheat Dextrin (BENEFIBER) powder, Take 1 packet by mouth Daily., Disp: , Rfl:   •  XIIDRA 5 % solution, Administer 1 drop to both eyes 2 (Two) Times a Day., Disp: , Rfl:   •  Lactobacillus-Inulin ([facility]) capsule, , Disp: , Rfl:        Assessment & Plan   Healthy female exam.      1. Healthcare Maintenance:  2. Patient Counseling:  --Nutrition: Stressed importance of moderation in sodium/caffeine intake, saturated fat and cholesterol, caloric balance, sufficient intake of fresh fruits, vegetables, fiber, calcium and vit D  --Exercise: she does exercise reg  --Substance Abuse: no tob no etoh  --Dental health: she does go to the dentist reg  --Immunizations reviewed.declines another covid vaccine  --Discussed benefits of screening colonoscopy.  3. Asthma-  Ok with current meds  4.  GERD- ok with lansoprazole

## 2022-11-11 ENCOUNTER — OFFICE VISIT (OUTPATIENT)
Dept: SLEEP MEDICINE | Facility: HOSPITAL | Age: 62
End: 2022-11-11

## 2022-11-11 VITALS
BODY MASS INDEX: 28.99 KG/M2 | WEIGHT: 174 LBS | HEIGHT: 65 IN | DIASTOLIC BLOOD PRESSURE: 56 MMHG | OXYGEN SATURATION: 97 % | HEART RATE: 79 BPM | SYSTOLIC BLOOD PRESSURE: 109 MMHG

## 2022-11-11 DIAGNOSIS — G47.33 OBSTRUCTIVE SLEEP APNEA: Primary | ICD-10-CM

## 2022-11-11 DIAGNOSIS — G47.10 PERSISTENT HYPERSOMNOLENCE DISORDER: ICD-10-CM

## 2022-11-11 PROCEDURE — G0463 HOSPITAL OUTPT CLINIC VISIT: HCPCS

## 2022-11-11 NOTE — PROGRESS NOTES
[facility]  [address]  [place] [address] [phone]    PCP: Shraddha Sosa MD    Reason for visit:  Sleep disorders: HANSEL    Rosalinda is a 62 y.o.female who was seen in the [facility] today. She was setup with CPAP and using nasal pillows. She sleeps from 10pm to 5am. Gradually getting used to the device.  [place] Sleepiness Scale is 7. Caffeine 3 per day. Alcohol 2 per week.    Rosalinda  reports that she has never smoked. She has never used smokeless tobacco.    Pertinent Positive Review of Systems of acid reflux, abdominal bloating - not due to CPAP.  Rest of Review of Systems was negative as recorded in Sleep Questionnaire.    Patient  has a past medical history of Arthritis, Asthma, Bladder spasm, Gastritis, GERD (gastroesophageal reflux disease), IBS (irritable bowel syndrome), Migraine, Palpitations, Panic attacks, Panic attacks, PONV (postoperative nausea and vomiting), Premature atrial contractions, Right sided abdominal pain, S/P D&C (status post dilation and curettage) (03/03/2020), Scoliosis, Sleep apnea (09/16/2022), SOB (shortness of breath), Spotting, Status post laparoscopic hysterectomy (11/17/2020), SVT (supraventricular tachycardia) ([facility]), Tingling, and Urinary tract infection.     Current Medications:    Current Outpatient Medications:   •  ASMANEX, 30 METERED DOSES, 110 MCG/INH inhaler, Inhale 1-2 puffs Daily. Can use twice a day if needed, Disp: , Rfl:   •  azelastine (ASTEPRO) 0.15 % solution nasal spray, 1 spray into the nostril(s) as directed by provider Daily As Needed., Disp: , Rfl:   •  Biotin 75512 MCG tablet, Take 1 tablet by mouth Daily., Disp: , Rfl:   •  calcium carb-cholecalciferol 600-800 MG-UNIT tablet, Take 1 tablet by mouth Daily., Disp: , Rfl:   •  [place] Oil-Levomenthol (FDgard) 25-20.75 MG capsule, Take 2 capsules by mouth 3 (Three) Times a Day As Needed (stomach issues)., Disp: , Rfl:   •  desloratadine (CLARINEX) 5  "MG tablet, Take 5 mg by mouth Every Morning., Disp: , Rfl:   •  dilTIAZem (CARDIZEM) 60 MG tablet, Take 2 tablets by mouth Daily AND 1 tablet Every Evening., Disp: 270 tablet, Rfl: 3  •  estradiol (ESTRACE) 0.1 MG/GM vaginal cream, Insert 1 g into the vagina 2 (Two) Times a Week., Disp: 42.5 g, Rfl: 4  •  estradiol (MINIVELLE, VIVELLE-DOT) 0.05 MG/24HR patch, APPLY 1 PATCH ON THE SKIN AS DIRECTED BY PROVIDER TWO TIMES A WEEK, Disp: 24 patch, Rfl: 0  •  FLUTICASONE PROPIONATE, NASAL, NA, 1 spray into the nostril(s) as directed by provider Daily As Needed., Disp: , Rfl:   •  Lactobacillus-Inulin ([employer]) capsule, , Disp: , Rfl:   •  lansoprazole (PREVACID) 30 MG capsule, TAKE 1 CAPSULE TWICE A DAY, Disp: 180 capsule, Rfl: 3  •  montelukast (SINGULAIR) 10 MG tablet, Take 10 mg by mouth Every Night., Disp: , Rfl:   •  Multiple Vitamin (MULTI VITAMIN DAILY PO), Take 2 tablets by mouth Daily., Disp: , Rfl:   •  Peppermint Oil (IBgard) 90 MG capsule controlled-release, Take 2 capsules by mouth 3 (Three) Times a Day As Needed (stomach issues). IBgard, Disp: , Rfl:   •  PROAIR  (90 BASE) MCG/ACT inhaler, Inhale 2 puffs Every 4 (Four) Hours As Needed., Disp: , Rfl:   •  Unable to find, 1 each 1 (One) Time. Relief Factor Take one packet per day., Disp: , Rfl:   •  uribel (URO-MP) 118 MG capsule capsule, Take 1 capsule by mouth 3 (Three) Times a Day As Needed., Disp: , Rfl:   •  Wheat Dextrin (BENEFIBER) powder, Take 1 packet by mouth Daily., Disp: , Rfl:   •  XIIDRA 5 % solution, Administer 1 drop to both eyes 2 (Two) Times a Day., Disp: , Rfl:    also entered in Sleep Questionnaire         Vital Signs: /56   Pulse 79   Ht 165.1 cm (65\")   Wt 78.9 kg (174 lb)   LMP  (LMP Unknown)   SpO2 97%   BMI 28.96 kg/m²     Body mass index is 28.96 kg/m².       Tongue: Normal       Dentition: good       Pharynx: Posterior pharyngeal pillars are wide   Mallampatti: III (soft and hard palate and base " of uvula visible)        General: Alert. Cooperative. Well developed. No acute distress.             Head:  Normocephalic. Symmetrical. Atraumatic.              Nose: No septal deviation. No drainage.          Throat: No oral lesions. No thrush. Moist mucous membranes.    Chest Wall:  Normal shape. Symmetric expansion with respiration. No tenderness.             Neck:  Trachea midline.           Lungs:  Clear to auscultation bilaterally. No wheezes. No rhonchi. No rales. Respirations regular, even and unlabored.            Heart:  Regular rhythm and normal rate. Normal S1 and S2. No murmur.     Abdomen:  Soft, non-tender and non-distended. Normal bowel sounds. No masses.  Extremities:  Moves all extremities well. No edema.    Psychiatric: Normal mood and affect.    Diagnostic data available to date is as below and was reviewed on current visit:  · 8/8/22  The patient tolerated the home sleep testing with monitoring time of 519 minutes. The data obtained make this a technically adequate study. The apnea hypopneas index(AHI) was 17.8 per sleep hour.  The AHI during supine position was - per sleep hour. Mean heart rate of 81.2 BPM.  Snoring was noted 0.7% of sleep time. Lowest oxygen saturation during the study was 78%. Saturation below 89% was noted for 70.4 mins.     Most current available usage data reviewed on 11/11/2022:  · 100% compliance average 7 hours 32 minutes AHI 0.8 average pressure 7.8 auto CPAP set 5-15    [facility] Company: [employer]    Prescription to [facility] for replacement supplies as below:    nasal pillow      Description Replacement    Nasal PILLOWS     x A 7034 Nasal Pillows  every 3 mth   x A 7033 Repl Nasal Pillows  2 per mth    Nasal MASK/CUSHION      A 7034 Nasal Mask/Cushion  every 3 mth    A 7032 Repl Nasal Mask/Cushion  2 per mth    Full Face MASK      A 7030 Full Face Mask  every 3 mth    A 7031 Repl Face Mask  1 per mth      A 4604 Heated Tubing  every 3 mth    A 7037 Standard Tubing  every 3 mth   x  A 7035 Headgear  every 3 mth   x A 7046 Repl Humidifier Chamber  every 6 yrs   x A 7038 Disposable Filters  2 per mth   x A 7039 Non-disposable Filter  every 6 mth   x A 7036 Chin Strap  every 6 mth         No orders of the defined types were placed in this encounter.         Impression:  1. Obstructive sleep apnea    2. Persistent hypersomnolence disorder        Plan:  Rosalinda is doing well with CPAP device.  However still somewhat sleepy during the day.  I will trial her on a slightly higher pressure and change to 8-12 centimeters.  Overnight oximetry on CPAP shows no significant desaturation.  I did advise patient that on the home sleep test she had significant hypoxia.  It is critical for her to continue regular usage of CPAP machine and especially to prevent cardiovascular health effects.    I reiterated the importance of effective treatment of obstructive sleep apnea with PAP machine.  Cardiovascular health risks of untreated sleep apnea were again reviewed.  Patient was asked to remain cautious if there is persistent hypersomnolence. The benefit of weight loss in reducing severity of obstructive sleep apnea was discussed.  Patient would benefit from adhering to a strict diet to achieve ideal BMI.     Change of PAP supplies regularly is important for effective use.  Change of cushion on the mask or plugs on nasal pillows along with disposable filters once every month and change of mask frame, tubing, headgear and Velcro straps every 6 months at the minimum was reiterated.    This patient is compliant with PAP machine and benefits from its use.  Apnea hypopneas index is corrected/improved.      Patient will follow up in this clinic in 6 months aprn    Thank you for allowing me to participate in your patient's care.    Electronically signed by Brodie Gabriel MD, 11/11/22, 2:05 PM EST.    Part of this note may be an electronic transcription/translation of spoken language to printed text using the Dragon Dictation  System.

## 2023-01-11 ENCOUNTER — OFFICE VISIT (OUTPATIENT)
Dept: OBSTETRICS AND GYNECOLOGY | Facility: CLINIC | Age: 63
End: 2023-01-11
Payer: COMMERCIAL

## 2023-01-11 VITALS
WEIGHT: 176 LBS | HEIGHT: 65 IN | BODY MASS INDEX: 29.32 KG/M2 | SYSTOLIC BLOOD PRESSURE: 128 MMHG | DIASTOLIC BLOOD PRESSURE: 70 MMHG

## 2023-01-11 DIAGNOSIS — R31.9 HEMATURIA, UNSPECIFIED TYPE: Primary | ICD-10-CM

## 2023-01-11 DIAGNOSIS — Z11.51 SPECIAL SCREENING EXAMINATION FOR HUMAN PAPILLOMAVIRUS (HPV): ICD-10-CM

## 2023-01-11 DIAGNOSIS — Z01.419 ROUTINE GYNECOLOGICAL EXAMINATION: ICD-10-CM

## 2023-01-11 DIAGNOSIS — N95.2 VAGINAL ATROPHY: ICD-10-CM

## 2023-01-11 DIAGNOSIS — Z79.890 HORMONE REPLACEMENT THERAPY (HRT): ICD-10-CM

## 2023-01-11 DIAGNOSIS — Z01.419 PAP SMEAR, LOW-RISK: ICD-10-CM

## 2023-01-11 DIAGNOSIS — Z12.31 ENCOUNTER FOR SCREENING MAMMOGRAM FOR MALIGNANT NEOPLASM OF BREAST: ICD-10-CM

## 2023-01-11 LAB
BILIRUB BLD-MCNC: NEGATIVE MG/DL
CLARITY, POC: CLEAR
COLOR UR: YELLOW
GLUCOSE UR STRIP-MCNC: NEGATIVE MG/DL
KETONES UR QL: NEGATIVE
LEUKOCYTE EST, POC: ABNORMAL
NITRITE UR-MCNC: NEGATIVE MG/ML
PH UR: 5 [PH] (ref 5–8)
PROT UR STRIP-MCNC: NEGATIVE MG/DL
RBC # UR STRIP: ABNORMAL /UL
SP GR UR: 1 (ref 1–1.03)
UROBILINOGEN UR QL: NORMAL

## 2023-01-11 PROCEDURE — 81002 URINALYSIS NONAUTO W/O SCOPE: CPT | Performed by: OBSTETRICS & GYNECOLOGY

## 2023-01-11 PROCEDURE — 99396 PREV VISIT EST AGE 40-64: CPT | Performed by: OBSTETRICS & GYNECOLOGY

## 2023-01-11 RX ORDER — ESTRADIOL 0.05 MG/D
1 FILM, EXTENDED RELEASE TRANSDERMAL 2 TIMES WEEKLY
Qty: 24 PATCH | Refills: 3 | Status: SHIPPED | OUTPATIENT
Start: 2023-01-12

## 2023-01-11 RX ORDER — ESTRADIOL 0.1 MG/G
1 CREAM VAGINAL 2 TIMES WEEKLY
Qty: 42.5 G | Refills: 4 | Status: SHIPPED | OUTPATIENT
Start: 2023-01-12

## 2023-01-11 NOTE — PROGRESS NOTES
GYN Annual Exam     CC- Here for annual exam.     Rosalinda Boone is a 62 y.o. female est pt who presents for annual well woman exam. She had TLH/OC for  VB in 2020 and she is happy with her results. She is on Vivelle dot 0.05 mg and Estrace cream vaginally. Her HF are better overall. She denies bladder issues or  VB. She was diagnosed with HANSEL this year in September and has not felt miraculously better. She is just now starting to have improvement in her palpitations, fatigue and memory. She is wanting to stay on the same HRT dose for now since she is just starting to feel better and I think that is reasonable given how poorly she did off HRT ( as well as on a lower dose). She will attempt to wean at next year's appt.  She has a h/o benign hematuria and sees urology regularly.     OB History        2    Para   2    Term   2            AB        Living   2       SAB        IAB        Ectopic        Molar        Multiple        Live Births              Obstetric Comments   2              Menarche:13  Menopause:52  HRT:yes x 6 years  Current contraception: 2020- TLH/BSO for  VB  History of abnormal Pap smear: no  History of abnormal mammogram: no  Family history of uterine, colon or ovarian cancer: no  Family history of breast cancer: no  STD's: none  Last pap smear:2022- nl pap/HPV  ELISABETH: neg  B9 hematuria      Health Maintenance   Topic Date Due   • TDAP/TD VACCINES (1 - Tdap) Never done   • ZOSTER VACCINE (1 of 2) Never done   • COVID-19 Vaccine (3 - Booster for Pfizer series) 2021   • Annual Gynecologic Pelvic and Breast Exam  2023   • Pneumococcal Vaccine 0-64 (1 - PCV) 11/15/2023 (Originally 3/30/1966)   • LIPID PANEL  2023   • ANNUAL PHYSICAL  11/10/2023   • MAMMOGRAM  10/21/2024   • PAP SMEAR  2025   • COLORECTAL CANCER SCREENING  2026   • HEPATITIS C SCREENING  Completed   • INFLUENZA VACCINE  Completed       Past Medical History:   Diagnosis Date    • Arthritis     HANDS, BACK , FEET, NECK   • Asthma    • Bladder spasm    • Gastritis    • GERD (gastroesophageal reflux disease)    • Hematuria- has had neg urology workup    • IBS (irritable bowel syndrome)    • Migraine    • Palpitations     lov cardiology 7/2020   • Panic attacks    • Panic attacks    • PONV (postoperative nausea and vomiting)    • Premature atrial contractions    • Right sided abdominal pain    • S/P D&C (status post dilation and curettage) 03/03/2020   • Scoliosis    • Sleep apnea 09/16/2022   • SOB (shortness of breath)    • Spotting     SCHEDULED FOR D&C   • Status post laparoscopic hysterectomy 11/17/2020   • SVT (supraventricular tachycardia) (HCC)     14 day Zio Patch starting on 12/22/17: 33 brief runs of SVT (longest 20 beats in length at 153 bpm).    • Tingling     in hands    • Urinary tract infection        Past Surgical History:   Procedure Laterality Date   • COLONOSCOPY N/A 12/5/2016    [facility],    • D & C HYSTEROSCOPY N/A 3/3/2020    Procedure: DILATATION AND CURETTAGE HYSTEROSCOPY, possible myosure;  Surgeon: Giana Paul MD;  Location: [facility];  Service: Obstetrics/Gynecology;  Laterality: N/A;  DILATION AND CURETTAGE HYSTEROSCOPY   • D & C WITH SUCTION      for DUB   • ENDOSCOPY N/A 12/5/2016    z line regular, 40 cm from incisors,  granular gastric mucosa, chronic gastritis   • LASIK     • OOPHORECTOMY     • TOTAL LAPAROSCOPIC HYSTERECTOMY N/A 11/17/2020    Procedure: TOTAL LAPAROSCOPIC HYSTERECTOMY with bilateral salpingoophorectomy;  Surgeon: Giana Paul MD;  Location: [facility];  Service: Obstetrics/Gynecology;  Laterality: N/A;         Current Outpatient Medications:   •  [START ON 1/12/2023] estradiol (ESTRACE) 0.1 MG/GM vaginal cream, Insert 1 g into the vagina 2 (Two) Times a Week., Disp: 42.5 g, Rfl: 4  •  [START ON 1/12/2023] estradiol (MINIVELLE, VIVELLE-DOT) 0.05 MG/24HR patch, Place 1 patch on the skin as directed by provider 2 (Two)  Times a Week., Disp: 24 patch, Rfl: 3  •  ASMANEX, 30 METERED DOSES, 110 MCG/INH inhaler, Inhale 1-2 puffs Daily. Can use twice a day if needed, Disp: , Rfl:   •  azelastine (ASTEPRO) 0.15 % solution nasal spray, 1 spray into the nostril(s) as directed by provider Daily As Needed., Disp: , Rfl:   •  Biotin 59601 MCG tablet, Take 1 tablet by mouth Daily., Disp: , Rfl:   •  calcium carb-cholecalciferol 600-800 MG-UNIT tablet, Take 1 tablet by mouth Daily., Disp: , Rfl:   •  Estelita Oil-Levomenthol (FDgard) 25-20.75 MG capsule, Take 2 capsules by mouth 3 (Three) Times a Day As Needed (stomach issues)., Disp: , Rfl:   •  desloratadine (CLARINEX) 5 MG tablet, Take 5 mg by mouth Every Morning., Disp: , Rfl:   •  dilTIAZem (CARDIZEM) 60 MG tablet, Take 2 tablets by mouth Daily AND 1 tablet Every Evening., Disp: 270 tablet, Rfl: 3  •  FLUTICASONE PROPIONATE, NASAL, NA, 1 spray into the nostril(s) as directed by provider Daily As Needed., Disp: , Rfl:   •  Lactobacillus-Inulin (Culturelle Digestive Health) capsule, , Disp: , Rfl:   •  lansoprazole (PREVACID) 30 MG capsule, TAKE 1 CAPSULE TWICE A DAY, Disp: 180 capsule, Rfl: 3  •  montelukast (SINGULAIR) 10 MG tablet, Take 10 mg by mouth Every Night., Disp: , Rfl:   •  Multiple Vitamin (MULTI VITAMIN DAILY PO), Take 2 tablets by mouth Daily., Disp: , Rfl:   •  Peppermint Oil (IBgard) 90 MG capsule controlled-release, Take 2 capsules by mouth 3 (Three) Times a Day As Needed (stomach issues). IBgard, Disp: , Rfl:   •  PROAIR  (90 BASE) MCG/ACT inhaler, Inhale 2 puffs Every 4 (Four) Hours As Needed., Disp: , Rfl:   •  Unable to find, 1 each 1 (One) Time. Relief Factor Take one packet per day., Disp: , Rfl:   •  uribel (URO-MP) 118 MG capsule capsule, Take 1 capsule by mouth 3 (Three) Times a Day As Needed., Disp: , Rfl:   •  Wheat Dextrin (BENEFIBER) powder, Take 1 packet by mouth Daily., Disp: , Rfl:   •  XIIDRA 5 % solution, Administer 1 drop to both eyes 2 (Two) Times  a Day., Disp: , Rfl:     Allergies   Allergen Reactions   • Codeine Dizziness   • Levaquin [Levofloxacin] GI Intolerance     FACIAL NUMBNESS   • Qvar [Beclomethasone] GI Intolerance     Tingling on one side of face   • Sulfa Antibiotics Hives   • Erythromycin Diarrhea   • Keflex [Cephalexin] Rash       Social History     Tobacco Use   • Smoking status: Never   • Smokeless tobacco: Never   Vaping Use   • Vaping Use: Never used   Substance Use Topics   • Alcohol use: Yes     Alcohol/week: 14.0 standard drinks     Types: 14 Glasses of wine per week     Comment: 2 glasses/nightly with dinner   • Drug use: No       Family History   Problem Relation Age of Onset   • Cancer Father 63        lung-smoker   • Aneurysm Mother 73        AAA   • Leukemia Maternal Grandmother    • Aneurysm Maternal Grandfather         Cerebral   • Scleroderma Maternal Aunt    • Breast cancer Neg Hx    • Ovarian cancer Neg Hx    • Colon cancer Neg Hx    • Deep vein thrombosis Neg Hx    • Pulmonary embolism Neg Hx    • Malig Hyperthermia Neg Hx    • Uterine cancer Neg Hx        Review of Systems   Constitutional: Positive for fatigue. Negative for activity change, appetite change, fever and unexpected weight change.   Respiratory: Negative for cough and shortness of breath.    Cardiovascular: Positive for palpitations. Negative for chest pain.   Gastrointestinal: Negative for abdominal distention, abdominal pain, constipation, diarrhea and nausea.   Endocrine: Negative for cold intolerance and heat intolerance.   Genitourinary: Negative for dyspareunia, dysuria, menstrual problem, pelvic pain, vaginal bleeding, vaginal discharge and vaginal pain.   Skin: Negative for color change and rash.   Allergic/Immunologic: Negative for environmental allergies and food allergies.   Neurological: Negative for dizziness and headaches.   Psychiatric/Behavioral: Positive for sleep disturbance. Negative for agitation, behavioral problems and dysphoric mood. The  "patient is not nervous/anxious.    All other systems reviewed and are negative.      /70   Ht 165.1 cm (65\")   Wt 79.8 kg (176 lb)   LMP  (LMP Unknown)   Breastfeeding No   BMI 29.29 kg/m²     Physical Exam   Constitutional: She is oriented to person, place, and time. She appears well-developed.   HENT:   Head: Normocephalic and atraumatic.   Eyes: Conjunctivae are normal. No scleral icterus.   Neck: No thyromegaly present.   Cardiovascular: Normal rate and regular rhythm.   Pulmonary/Chest: Effort normal and breath sounds normal. Right breast exhibits no inverted nipple, no mass, no nipple discharge, no skin change and no tenderness. Left breast exhibits no inverted nipple, no mass, no nipple discharge, no skin change and no tenderness.   Abdominal: Soft. Normal appearance and bowel sounds are normal. She exhibits no distension and no mass. There is no abdominal tenderness. There is no rebound and no guarding. No hernia.   Genitourinary:    Rectum normal.      Pelvic exam was performed with patient supine.   There is no rash, tenderness or lesion on the right labia. There is no rash, tenderness or lesion on the left labia. Right adnexum displays no mass, no tenderness and no fullness. Left adnexum displays no mass, no tenderness and no fullness.    No vaginal discharge, erythema, tenderness or bleeding.   No erythema, tenderness or bleeding in the vagina.    No foreign body in the vagina.      No signs of injury in the vagina.      Genitourinary Comments: Uterus and cervix absent  Cuff smooth     Neurological: She is alert and oriented to person, place, and time.   Skin: Skin is warm and dry.   Psychiatric: Her behavior is normal. Mood, judgment and thought content normal.   Nursing note and vitals reviewed.           Assessment/Plan    1) GYN HM: normal pap/HPV 1/2022. SBE demonstrated and encouraged.  2) STD screening: declines Condoms encouraged.  3) Bone health - Weight bearing exercise, dietary " calcium recommendations and vitamin D reviewed.   4) Diet and Exercise discussed  5) Smoking Status: No   6) Hematuria- check UA and CS, treat UTI if found. Pt asymptomatic with h/o B9 hematuria  7)MMG: UTD 3/2021- [facility] 2. Schedule MMG 3/2022 ( [facility])  8) DEXA- UTD 10/2022- osteopenia with risk of fx 8 & 0.8 %, repeat DEXA  2-3 years ( [facility])  9)C scope- UTD 12/2016- normal, repeat 10 years  10) Vaginal atrophy - doing well on Estrace cream x 2 week. ERX sent in.   11)HRT- Patient counseled that hormone treatment should be used to help with specific symptoms related to menopause such as hot flashes, night sweats and vaginal atrophy.  Hormones should not be given for “general wellbeing” or to avoid aging. The lowest dose hormone that treats symptoms should be used for the shortest amount of time possible.  Although estrogen is the most effective treatment for hot flashes, other non hormonal options exist (such as Brisdelle or venlafaxine) and should also be considered.  Anyone with an intact uterus should also receive progestogen to prevent uterine overgrowth that can lead to uterine cancer.  All hormone therapy, whether it is synthetic or bio identical, can lead to increased risk of stroke, heart attack and thromboembolic diseases.  These adverse events are more likely to develop in the first year of use and in patients who are older than the typical menopausal age.  Risks of estrogen dependent cancers such as breast cancer increase with prolonged use.  Attempts to wean hormone therapy should be discussed annually and particularly after three to five years of use.  Any side effects such as vaginal bleeding or pain should be reported immediately.  ERX Vivelle dot 0.05 mg. Will attempt to wean next year  13) I saw the patient with a face mask, gloves and eye protection.  The patient herself was masked.  Social distancing was observed as appropriate.  14)Parts of this document have been copied or forwarded from her  previous visits and have been reviewed, updated and edited as indicated.   15)Follow up orn and 1 year annual        Diagnoses and all orders for this visit:    1. Hematuria, unspecified type (Primary)  -     Urine Culture - Urine, Urine, Random Void  -     Urinalysis With Microscopic - Urine, Random Void    2. Routine gynecological examination  -     POC Urinalysis Dipstick  -     Cancel: IGP, Apt HPV,rfx 16 / 18,45    3. Pap smear, low-risk  -     POC Urinalysis Dipstick  -     Cancel: IGP, Apt HPV,rfx 16 / 18,45    4. Special screening examination for human papillomavirus (HPV)  -     POC Urinalysis Dipstick  -     Cancel: IGP, Apt HPV,rfx 16 / 18,45    5. Encounter for screening mammogram for malignant neoplasm of breast  -     Mammo Screening Digital Tomosynthesis Bilateral With CAD; Future    6. Hormone replacement therapy (HRT)    7. Vaginal atrophy    Other orders  -     estradiol (MINIVELLE, VIVELLE-DOT) 0.05 MG/24HR patch; Place 1 patch on the skin as directed by provider 2 (Two) Times a Week.  Dispense: 24 patch; Refill: 3  -     estradiol (ESTRACE) 0.1 MG/GM vaginal cream; Insert 1 g into the vagina 2 (Two) Times a Week.  Dispense: 42.5 g; Refill: 4        Giana Paul MD  1/11/2023  15:40 EST

## 2023-01-13 LAB
APPEARANCE UR: ABNORMAL
BACTERIA #/AREA URNS HPF: ABNORMAL /HPF
BACTERIA UR CULT: NORMAL
BACTERIA UR CULT: NORMAL
BILIRUB UR QL STRIP: NEGATIVE
CASTS URNS MICRO: ABNORMAL
COLOR UR: YELLOW
EPI CELLS #/AREA URNS HPF: ABNORMAL /HPF
GLUCOSE UR QL STRIP: NEGATIVE
HGB UR QL STRIP: ABNORMAL
KETONES UR QL STRIP: NEGATIVE
LEUKOCYTE ESTERASE UR QL STRIP: ABNORMAL
NITRITE UR QL STRIP: NEGATIVE
PH UR STRIP: 6 [PH] (ref 5–8)
PROT UR QL STRIP: NEGATIVE
RBC #/AREA URNS HPF: ABNORMAL /HPF
SP GR UR STRIP: 1.01 (ref 1–1.03)
UROBILINOGEN UR STRIP-MCNC: ABNORMAL MG/DL
WBC #/AREA URNS HPF: ABNORMAL /HPF

## 2023-02-14 ENCOUNTER — HOSPITAL ENCOUNTER (EMERGENCY)
Facility: HOSPITAL | Age: 63
Discharge: HOME | End: 2023-02-14
Attending: EMERGENCY MEDICINE | Admitting: EMERGENCY MEDICINE
Payer: COMMERCIAL

## 2023-02-14 ENCOUNTER — APPOINTMENT (OUTPATIENT)
Dept: GENERAL RADIOLOGY | Facility: HOSPITAL | Age: 63
End: 2023-02-14
Payer: COMMERCIAL

## 2023-02-14 ENCOUNTER — APPOINTMENT (OUTPATIENT)
Dept: CT IMAGING | Facility: HOSPITAL | Age: 63
End: 2023-02-14
Payer: COMMERCIAL

## 2023-02-14 VITALS
SYSTOLIC BLOOD PRESSURE: 131 MMHG | DIASTOLIC BLOOD PRESSURE: 83 MMHG | TEMPERATURE: 97.4 F | WEIGHT: 173 LBS | OXYGEN SATURATION: 98 % | HEART RATE: 85 BPM | HEIGHT: 63 IN | BODY MASS INDEX: 30.65 KG/M2 | RESPIRATION RATE: 17 BRPM

## 2023-02-14 DIAGNOSIS — G43.809 OTHER MIGRAINE WITHOUT STATUS MIGRAINOSUS, NOT INTRACTABLE: Primary | ICD-10-CM

## 2023-02-14 LAB
ALBUMIN SERPL-MCNC: 4.3 G/DL (ref 3.5–5.2)
ALBUMIN/GLOB SERPL: 1.8 G/DL
ALP SERPL-CCNC: 59 U/L (ref 39–117)
ALT SERPL W P-5'-P-CCNC: 18 U/L (ref 1–33)
ANION GAP SERPL CALCULATED.3IONS-SCNC: 9 MMOL/L (ref 5–15)
AST SERPL-CCNC: 21 U/L (ref 1–32)
BASOPHILS # BLD AUTO: 0.1 10*3/MM3 (ref 0–0.2)
BASOPHILS NFR BLD AUTO: 0.7 % (ref 0–1.5)
BILIRUB SERPL-MCNC: 0.3 MG/DL (ref 0–1.2)
BUN SERPL-MCNC: 13 MG/DL (ref 8–23)
BUN/CREAT SERPL: 20 (ref 7–25)
CALCIUM SPEC-SCNC: 9.2 MG/DL (ref 8.6–10.5)
CHLORIDE SERPL-SCNC: 104 MMOL/L (ref 98–107)
CO2 SERPL-SCNC: 25 MMOL/L (ref 22–29)
CREAT SERPL-MCNC: 0.65 MG/DL (ref 0.57–1)
DEPRECATED RDW RBC AUTO: 39.8 FL (ref 37–54)
EGFRCR SERPLBLD CKD-EPI 2021: 99.7 ML/MIN/1.73
EOSINOPHIL # BLD AUTO: 0.13 10*3/MM3 (ref 0–0.4)
EOSINOPHIL NFR BLD AUTO: 0.9 % (ref 0.3–6.2)
ERYTHROCYTE [DISTWIDTH] IN BLOOD BY AUTOMATED COUNT: 12 % (ref 12.3–15.4)
GEN 5 2HR TROPONIN T REFLEX: <6 NG/L
GLOBULIN UR ELPH-MCNC: 2.4 GM/DL
GLUCOSE SERPL-MCNC: 91 MG/DL (ref 65–99)
HCT VFR BLD AUTO: 39.3 % (ref 34–46.6)
HGB BLD-MCNC: 13.8 G/DL (ref 12–15.9)
HOLD SPECIMEN: NORMAL
HOLD SPECIMEN: NORMAL
IMM GRANULOCYTES # BLD AUTO: 0.05 10*3/MM3 (ref 0–0.05)
IMM GRANULOCYTES NFR BLD AUTO: 0.4 % (ref 0–0.5)
LYMPHOCYTES # BLD AUTO: 2.43 10*3/MM3 (ref 0.7–3.1)
LYMPHOCYTES NFR BLD AUTO: 17.6 % (ref 19.6–45.3)
MCH RBC QN AUTO: 32.7 PG (ref 26.6–33)
MCHC RBC AUTO-ENTMCNC: 35.1 G/DL (ref 31.5–35.7)
MCV RBC AUTO: 93.1 FL (ref 79–97)
MONOCYTES # BLD AUTO: 0.85 10*3/MM3 (ref 0.1–0.9)
MONOCYTES NFR BLD AUTO: 6.2 % (ref 5–12)
NEUTROPHILS NFR BLD AUTO: 10.21 10*3/MM3 (ref 1.7–7)
NEUTROPHILS NFR BLD AUTO: 74.2 % (ref 42.7–76)
NRBC BLD AUTO-RTO: 0 /100 WBC (ref 0–0.2)
PLATELET # BLD AUTO: 311 10*3/MM3 (ref 140–450)
PMV BLD AUTO: 9.4 FL (ref 6–12)
POTASSIUM SERPL-SCNC: 3.8 MMOL/L (ref 3.5–5.2)
PROT SERPL-MCNC: 6.7 G/DL (ref 6–8.5)
QT INTERVAL: 432 MS
RBC # BLD AUTO: 4.22 10*6/MM3 (ref 3.77–5.28)
SODIUM SERPL-SCNC: 138 MMOL/L (ref 136–145)
TROPONIN T DELTA: NORMAL
TROPONIN T SERPL HS-MCNC: <6 NG/L
WBC NRBC COR # BLD: 13.77 10*3/MM3 (ref 3.4–10.8)
WHOLE BLOOD HOLD COAG: NORMAL
WHOLE BLOOD HOLD SPECIMEN: NORMAL

## 2023-02-14 PROCEDURE — 71046 X-RAY EXAM CHEST 2 VIEWS: CPT

## 2023-02-14 PROCEDURE — 25010000002 DIPHENHYDRAMINE PER 50 MG: Performed by: PHYSICIAN ASSISTANT

## 2023-02-14 PROCEDURE — 93005 ELECTROCARDIOGRAM TRACING: CPT | Performed by: EMERGENCY MEDICINE

## 2023-02-14 PROCEDURE — 84484 ASSAY OF TROPONIN QUANT: CPT | Performed by: EMERGENCY MEDICINE

## 2023-02-14 PROCEDURE — 96375 TX/PRO/DX INJ NEW DRUG ADDON: CPT

## 2023-02-14 PROCEDURE — 36415 COLL VENOUS BLD VENIPUNCTURE: CPT

## 2023-02-14 PROCEDURE — 93005 ELECTROCARDIOGRAM TRACING: CPT

## 2023-02-14 PROCEDURE — 80053 COMPREHEN METABOLIC PANEL: CPT

## 2023-02-14 PROCEDURE — 84484 ASSAY OF TROPONIN QUANT: CPT

## 2023-02-14 PROCEDURE — 99284 EMERGENCY DEPT VISIT MOD MDM: CPT

## 2023-02-14 PROCEDURE — 25010000002 KETOROLAC TROMETHAMINE PER 15 MG: Performed by: PHYSICIAN ASSISTANT

## 2023-02-14 PROCEDURE — 93010 ELECTROCARDIOGRAM REPORT: CPT | Performed by: INTERNAL MEDICINE

## 2023-02-14 PROCEDURE — 96374 THER/PROPH/DIAG INJ IV PUSH: CPT

## 2023-02-14 PROCEDURE — 70450 CT HEAD/BRAIN W/O DYE: CPT

## 2023-02-14 PROCEDURE — 85025 COMPLETE CBC W/AUTO DIFF WBC: CPT

## 2023-02-14 PROCEDURE — 25010000002 METOCLOPRAMIDE PER 10 MG: Performed by: PHYSICIAN ASSISTANT

## 2023-02-14 RX ORDER — DIPHENHYDRAMINE HYDROCHLORIDE 50 MG/ML
25 INJECTION INTRAMUSCULAR; INTRAVENOUS ONCE
Status: COMPLETED | OUTPATIENT
Start: 2023-02-14 | End: 2023-02-14

## 2023-02-14 RX ORDER — SODIUM CHLORIDE 0.9 % (FLUSH) 0.9 %
10 SYRINGE (ML) INJECTION AS NEEDED
Status: DISCONTINUED | OUTPATIENT
Start: 2023-02-14 | End: 2023-02-15 | Stop reason: HOSPADM

## 2023-02-14 RX ORDER — ASPIRIN 325 MG
325 TABLET ORAL ONCE
Status: DISCONTINUED | OUTPATIENT
Start: 2023-02-14 | End: 2023-02-14

## 2023-02-14 RX ORDER — KETOROLAC TROMETHAMINE 15 MG/ML
15 INJECTION, SOLUTION INTRAMUSCULAR; INTRAVENOUS ONCE
Status: COMPLETED | OUTPATIENT
Start: 2023-02-14 | End: 2023-02-14

## 2023-02-14 RX ORDER — METOCLOPRAMIDE HYDROCHLORIDE 5 MG/ML
10 INJECTION INTRAMUSCULAR; INTRAVENOUS ONCE
Status: COMPLETED | OUTPATIENT
Start: 2023-02-14 | End: 2023-02-14

## 2023-02-14 RX ADMIN — METOCLOPRAMIDE 10 MG: 5 INJECTION, SOLUTION INTRAMUSCULAR; INTRAVENOUS at 21:45

## 2023-02-14 RX ADMIN — KETOROLAC TROMETHAMINE 15 MG: 15 INJECTION, SOLUTION INTRAMUSCULAR; INTRAVENOUS at 21:45

## 2023-02-14 RX ADMIN — SODIUM CHLORIDE 1000 ML: 9 INJECTION, SOLUTION INTRAVENOUS at 21:45

## 2023-02-14 RX ADMIN — DIPHENHYDRAMINE HYDROCHLORIDE 25 MG: 50 INJECTION, SOLUTION INTRAMUSCULAR; INTRAVENOUS at 21:45

## 2023-02-15 NOTE — ED TRIAGE NOTES
Patient from home via private vehicle reporting intermittent dizziness described as feeling faint all day. Patient states at around 1730 she began experiencing chest tightness and tingling in bilateral hands and feet. These symptoms resolved after about an hour. Around 1900 patient developed a headache that begins in her neck and radiates forward into her forehead. Pain is described as tightness.

## 2023-02-15 NOTE — ED PROVIDER NOTES
" EMERGENCY DEPARTMENT ENCOUNTER    Room Number:  T02/02  Date of encounter:  2/14/2023  PCP: Shraddha Sosa MD  Patient Care Team:  Shraddha oSsa MD as PCP - General (Internal Medicine)  Candelario Sears MD as Consulting Physician (Allergy)  Jet Velarde IV, MD as Surgeon (Neurosurgery)   Independent Historians: Patient    HPI:  Chief Complaint: Dizziness  A complete HPI/ROS/PMH/PSH/SH/FH are unobtainable due to: N/A    Chronic or social conditions impacting patient care (social determinants of health): None    Context: Rosalinda Boone is a 62 y.o. female with past medical history of asthma, sleep apnea, SVT, migraine headaches, and anxiety/panic attacks who arrives to the ED with complaint of dizziness since Saturday.  Patient states that she started feeling off on Saturday with dizziness that she describes as a \"woozy headed feeling\" that improved on Sunday and Monday and then worsened today.  Then today at around 5:30 in the afternoon patient started to feel like her heart was racing, sweating, and noticed tingling in her extremities.  She also developed some chest tightness and headache.  Patient states that the chest tightness lasted for a couple hours and is currently improved.  Patient states that her headache started in the back of her head/neck and radiated to the front.  Patient denies any nausea or vomiting, no fever, chills, cough, or shortness of breath.  Patient states that this headache and symptoms are different from her usual migraines which are typically an ocular migraine.    Review of prior external notes (non-ED): None    Review of prior external test results outside of this encounter: Outpatient echo from 3/24/2022 shows an estimated LVEF of 65% and normal left ventricular systolic function and a 14-day Holter showed predominant sinus rhythm with heart rate ranging from 55 253 bpm with rare supraventricular ectopy with 62 runs of nonsustained SVT.    PAST MEDICAL HISTORY  Active Ambulatory Problems "     Diagnosis Date Noted   • Mild intermittent asthma without complication 08/09/2016   • GERD (gastroesophageal reflux disease) 08/09/2016   • B12 deficiency 08/09/2016   • Hip pain, left 07/12/2017   • Idiopathic scoliosis and kyphoscoliosis 07/12/2017   • Osteoarthritis of lumbar spine 07/12/2017   • Lumbar radicular pain 07/12/2017   • Iliotibial band syndrome of left side 07/12/2017   • Post-menopausal 09/19/2017   • Heart palpitations 11/20/2017   • Insomnia 11/20/2017   • IBS (irritable bowel syndrome)    • Anxiety 03/20/2019   • Hot flashes due to menopause 03/20/2019   • Hyperlipidemia 06/19/2019   • PMB (postmenopausal bleeding) 02/24/2020   • S/P D&C (status post dilation and curettage) 03/03/2020   • Status post laparoscopic hysterectomy 11/17/2020   • Premature atrial contractions    • Hematuria- has had neg urology workup 01/11/2023     Resolved Ambulatory Problems     Diagnosis Date Noted   • Well woman exam with routine gynecological exam 09/19/2017   • Breast cancer screening 09/19/2017   • Cervical cancer screening 09/19/2017     Past Medical History:   Diagnosis Date   • Arthritis    • Asthma    • Bladder spasm    • Gastritis    • Migraine    • Palpitations    • Panic attacks    • Panic attacks    • PONV (postoperative nausea and vomiting)    • Right sided abdominal pain    • Scoliosis    • Sleep apnea 09/16/2022   • SOB (shortness of breath)    • Spotting    • SVT (supraventricular tachycardia) ([facility])    • Tingling    • Urinary tract infection        The patient has started, but not completed, their COVID-19 vaccination series.    PAST SURGICAL HISTORY  Past Surgical History:   Procedure Laterality Date   • COLONOSCOPY N/A 12/5/2016    [facility],    • D & C HYSTEROSCOPY N/A 3/3/2020    Procedure: DILATATION AND CURETTAGE HYSTEROSCOPY, possible myosure;  Surgeon: Giana Paul MD;  Location: [facility];  Service: Obstetrics/Gynecology;  Laterality: N/A;  DILATION AND CURETTAGE HYSTEROSCOPY    • D & C WITH SUCTION      for DUB   • ENDOSCOPY N/A 12/5/2016    z line regular, 40 cm from incisors,  granular gastric mucosa, chronic gastritis   • LASIK     • OOPHORECTOMY     • TOTAL LAPAROSCOPIC HYSTERECTOMY N/A 11/17/2020    Procedure: TOTAL LAPAROSCOPIC HYSTERECTOMY with bilateral salpingoophorectomy;  Surgeon: Giana Paul MD;  Location: [facility];  Service: Obstetrics/Gynecology;  Laterality: N/A;         FAMILY HISTORY  Family History   Problem Relation Age of Onset   • Cancer Father 63        lung-smoker   • Aneurysm Mother 73        AAA   • Leukemia Maternal Grandmother    • Aneurysm Maternal Grandfather         Cerebral   • Scleroderma Maternal Aunt    • Breast cancer Neg Hx    • Ovarian cancer Neg Hx    • Colon cancer Neg Hx    • Deep vein thrombosis Neg Hx    • Pulmonary embolism Neg Hx    • Malig Hyperthermia Neg Hx    • Uterine cancer Neg Hx          SOCIAL HISTORY  Social History     Socioeconomic History   • Marital status:      Spouse name: MATTIE ALMARAZ   • Number of children: 2   Tobacco Use   • Smoking status: Never   • Smokeless tobacco: Never   Vaping Use   • Vaping Use: Never used   Substance and Sexual Activity   • Alcohol use: Yes     Alcohol/week: 14.0 standard drinks     Types: 14 Glasses of wine per week     Comment: 2 glasses/nightly with dinner   • Drug use: No   • Sexual activity: Yes     Partners: Male     Birth control/protection: Post-menopausal, Surgical     Comment: [facility] BSO for recurrent  VB         ALLERGIES  Codeine, Levaquin [levofloxacin], Qvar [beclomethasone], Sulfa antibiotics, Erythromycin, and Keflex [cephalexin]        REVIEW OF SYSTEMS  Review of Systems     All systems reviewed and negative except for those discussed in HPI.       PHYSICAL EXAM    I have reviewed the triage vital signs and nursing notes.    ED Triage Vitals   Temp Heart Rate Resp BP SpO2   02/14/23 1921 02/14/23 1921 02/14/23 1921 02/14/23 1935 02/14/23 1921   97.4 °F (36.3  °C) 82 16 138/84 95 %      Temp src Heart Rate Source Patient Position BP Location FiO2 (%)   02/14/23 1921 02/14/23 1921 -- -- --   Tympanic Monitor          Physical Exam    GENERAL: alert and oriented x4, anxious, not distressed  HENT: normocephalic, atraumatic, moist mucous membranes, no pharyngeal erythema or tonsillar exudate  EYES: no scleral icterus, PERRL, EOMI  CV: regular rhythm, regular rate, no murmurs, rubs, or gallops  RESPIRATORY: normal effort, CTAB, no chest wall tenderness  ABDOMEN: soft/nontender  MUSCULOSKELETAL: no deformity  NEURO: alert, moves all extremities, no focal neuro deficits, follows commands  SKIN: warm, dry, no rash   Psych: Appropriate mood and affect      Nursing notes and vital signs reviewed      LAB RESULTS  Recent Results (from the past 24 hour(s))   ECG 12 Lead ED Triage Standing Order; Chest Pain    Collection Time: 02/14/23  7:36 PM   Result Value Ref Range    QT Interval 432 ms   Comprehensive Metabolic Panel    Collection Time: 02/14/23  7:46 PM    Specimen: Arm, Left; Blood   Result Value Ref Range    Glucose 91 65 - 99 mg/dL    BUN 13 8 - 23 mg/dL    Creatinine 0.65 0.57 - 1.00 mg/dL    Sodium 138 136 - 145 mmol/L    Potassium 3.8 3.5 - 5.2 mmol/L    Chloride 104 98 - 107 mmol/L    CO2 25.0 22.0 - 29.0 mmol/L    Calcium 9.2 8.6 - 10.5 mg/dL    Total Protein 6.7 6.0 - 8.5 g/dL    Albumin 4.3 3.5 - 5.2 g/dL    ALT (SGPT) 18 1 - 33 U/L    AST (SGOT) 21 1 - 32 U/L    Alkaline Phosphatase 59 39 - 117 U/L    Total Bilirubin 0.3 0.0 - 1.2 mg/dL    Globulin 2.4 gm/dL    A/G Ratio 1.8 g/dL    BUN/Creatinine Ratio 20.0 7.0 - 25.0    Anion Gap 9.0 5.0 - 15.0 mmol/L    eGFR 99.7 >60.0 mL/min/1.73   High Sensitivity Troponin T    Collection Time: 02/14/23  7:46 PM    Specimen: Arm, Left; Blood   Result Value Ref Range    HS Troponin T <6 <10 ng/L   Green Top (Gel)    Collection Time: 02/14/23  7:46 PM   Result Value Ref Range    Extra Tube Hold for add-ons.    Lavender Top     Collection Time: 02/14/23  7:46 PM   Result Value Ref Range    Extra Tube hold for add-on    Gold Top - SST    Collection Time: 02/14/23  7:46 PM   Result Value Ref Range    Extra Tube Hold for add-ons.    Light Blue Top    Collection Time: 02/14/23  7:46 PM   Result Value Ref Range    Extra Tube Hold for add-ons.    CBC Auto Differential    Collection Time: 02/14/23  7:46 PM    Specimen: Arm, Left; Blood   Result Value Ref Range    WBC 13.77 (H) 3.40 - 10.80 10*3/mm3    RBC 4.22 3.77 - 5.28 10*6/mm3    Hemoglobin 13.8 12.0 - 15.9 g/dL    Hematocrit 39.3 34.0 - 46.6 %    MCV 93.1 79.0 - 97.0 fL    MCH 32.7 26.6 - 33.0 pg    MCHC 35.1 31.5 - 35.7 g/dL    RDW 12.0 (L) 12.3 - 15.4 %    RDW-SD 39.8 37.0 - 54.0 fl    MPV 9.4 6.0 - 12.0 fL    Platelets 311 140 - 450 10*3/mm3    Neutrophil % 74.2 42.7 - 76.0 %    Lymphocyte % 17.6 (L) 19.6 - 45.3 %    Monocyte % 6.2 5.0 - 12.0 %    Eosinophil % 0.9 0.3 - 6.2 %    Basophil % 0.7 0.0 - 1.5 %    Immature Grans % 0.4 0.0 - 0.5 %    Neutrophils, Absolute 10.21 (H) 1.70 - 7.00 10*3/mm3    Lymphocytes, Absolute 2.43 0.70 - 3.10 10*3/mm3    Monocytes, Absolute 0.85 0.10 - 0.90 10*3/mm3    Eosinophils, Absolute 0.13 0.00 - 0.40 10*3/mm3    Basophils, Absolute 0.10 0.00 - 0.20 10*3/mm3    Immature Grans, Absolute 0.05 0.00 - 0.05 10*3/mm3    nRBC 0.0 0.0 - 0.2 /100 WBC   High Sensitivity Troponin T 2Hr    Collection Time: 02/14/23  9:43 PM    Specimen: Blood   Result Value Ref Range    HS Troponin T <6 <10 ng/L    Troponin T Delta         Ordered the above labs and independently reviewed and interpreted the results by me.        RADIOLOGY  XR Chest 2 View    Result Date: 2/14/2023  XR CHEST 2 VW-  HISTORY:  Dizziness, headache, chest tightness.  COMPARISON:  Chest radiograph 04/24/2003  FINDINGS:  2 views of the chest were obtained.  Support Devices:  None. Cardiac Silhouette/Mediastinum/Silvana:  The cardiac, mediastinal, and hilar contours are within normal limits. Lungs/Pleural  Spaces:  The lungs and pleural spaces are clear. Chest Wall/Diaphragm/Upper Abdomen:  There is partially imaged curvature of the lower thoracic/upper lumbar spine. There is multilevel degenerative disc disease.   CONCLUSION(S):   1.  No focal consolidation or effusion.  This report was finalized on 2/14/2023 8:06 PM by Dr. Thu Cain M.D.      CT Head Without Contrast    Result Date: 2/14/2023  CT HEAD  HISTORY:Dizziness, headache  TECHNIQUE: CT scan of the head was obtained with 3 mm axial images without intravenous contrast.  Radiation dose reduction techniques were utilized, including automated exposure control and exposure modulation based on body size.  COMPARISON:None  FINDINGS: There is no finding of acute infarct, hemorrhage, contusion or abnormal extra-axial collection. No hydrocephalus is present.      1.  No noncontrast CT findings of acute intracranial abnormality.   This report was finalized on 2/14/2023 10:34 PM by Dr. James Olivares M.D.        I ordered the above noted radiological studies.  These were independently interpreted and reviewed by me.  See dictation for official radiology interpretation.      PROCEDURES    Procedures      MEDICATIONS GIVEN IN ER    Medications   sodium chloride 0.9 % flush 10 mL (has no administration in time range)   sodium chloride 0.9 % bolus 1,000 mL (0 mL Intravenous Stopped 2/14/23 2226)   ketorolac (TORADOL) injection 15 mg (15 mg Intravenous Given 2/14/23 2145)   diphenhydrAMINE (BENADRYL) injection 25 mg (25 mg Intravenous Given 2/14/23 2145)   metoclopramide (REGLAN) injection 10 mg (10 mg Intravenous Given 2/14/23 2145)         PROGRESS, DATA ANALYSIS, CONSULTS, AND MEDICAL DECISION MAKING    All labs have been independently reviewed by me.  All radiology studies have been reviewed by me and discussed with radiologist dictating the report.   EKG's independently viewed and interpreted by me.  Discussion below represents my analysis of pertinent findings  related to patient's condition, differential diagnosis, treatment plan and final disposition.    DDx:  Includes, but is not limited to migraine headache, tension headache, SVT, A-fib, a flutter, palpitations, anxiety, panic attack    ED Course as of 02/14/23 2309 Tue Feb 14, 2023 2038 EKG          EKG time: 19:36  Rhythm/Rate: Sinus rhythm at 61 bpm  P waves and IN: Normal  QRS, axis: Normal  ST and T waves: No acute ST/T wave changes    Interpreted Contemporaneously by me, independently viewed  Unchanged compared to prior EKG of 2/26/2020.   [LATISHA]   2039 WBC(!): 13.77 [LATISHA]   2039 Hemoglobin: 13.8 [LATISHA]   2039 Hematocrit: 39.3 [LATISHA]   2039 Platelets: 311 [LATISHA]   2039 Glucose: 91 [LATISHA]   2039 BUN: 13 [LATISHA]   2039 Creatinine: 0.65 [LATISHA]   2040 Sodium: 138 [LATISHA]   2040 Potassium: 3.8 [LATISHA]   2040 Chloride: 104 [LATISHA]   2040 CO2: 25.0 [LATISHA]   2040 HS Troponin T: <6 [LATISHA]   2040 Chest x-ray images independently viewed by me, my interpretation is no cardiomegaly or mediastinal widening, no infiltrate or active disease. [LATISHA]   2217 HS Troponin T: <6 [LATISHA]   2218 CT head images viewed by me, my interpretation is no obvious acute intracranial hemorrhage or mass. [LATISHA]   2253 Patient rechecked, resting comfortably in bed, states that her headache and symptoms have completely resolved.  Discussed results including negative cardiac enzymes and negative CT scan of the head and plan for discharge with PCP follow-up.  Patient expressed understanding and is comfortable with discharge home. [LATISHA]      ED Course User Index  [LATISHA] Jet Liriano PA       MDM: Patient's cardiac evaluation is normal including a nonischemic EKG and 2 negative high-sensitivity troponins.  Patient CT scan of the head shows no evidence for acute intracranial abnormality and symptoms have resolved with migraine cocktail.  Have discussed strict return to ER precautions, vital signs are stable, patient is safe for discharge home.    PPE:  The patient wore a mask and I  wore a mask and all appropriate PPE throughout the entire patient encounter.      AS OF 23:09 EST VITALS:    BP - 131/83  HR - 85  TEMP - 97.4 °F (36.3 °C) (Tympanic)  O2 SATS - 98%      DIAGNOSIS  Final diagnoses:   Other migraine without status migrainosus, not intractable         DISPOSITION  DISCHARGE    Patient discharged in stable condition.    Reviewed implications of results, diagnosis, meds, responsibility to follow up, warning signs and symptoms of possible worsening, potential complications and reasons to return to ER.    Patient/Family voiced understanding of above instructions.    Discussed plan for discharge, as there is no emergent indication for admission. Patient referred to primary care provider for BP management due to today's BP. Pt/family is agreeable and understands need for follow up and repeat testing.  Pt is aware that discharge does not mean that nothing is wrong but it indicates no emergency is present that requires admission and they must continue care with follow-up as given below or physician of their choice.     FOLLOW-UP  Shraddha Sosa MD  [address]  [address] [place], [place] [id number] [phone]    Schedule an appointment as soon as possible for a visit in 3 days           Medication List      No changes were made to your prescriptions during this visit.           Note Disclaimer: At [facility], we believe that sharing information builds trust and better relationships. You are receiving this note because you recently visited [facility]. It is possible you will see health information before a provider has talked with you about it. This kind of information can be easy to misunderstand. To help you fully understand what it means for your health, we urge you to discuss this note with your provider.     Jet Liriano PA  02/14/23 2575

## 2023-02-15 NOTE — ED PROVIDER NOTES
"MD ATTESTATION NOTE    The ANITA and I have discussed this patient's history, physical exam, and treatment plan.  I have reviewed the documentation and personally had a face to face interaction with the patient. I affirm the documentation and agree with the treatment and plan.  The attached note describes my personal findings.    I provided a substantive portion of the care of this patient. I personally performed the physical exam, in its entirety.    History  62-year-old female with history of migraine, SVT and panic attacks presents with multiple complaints off-and-on since Saturday.  She has had episodes of dizziness described as \"woozy\".  She is also had intermittent chest discomfort and headache.    Physical Exam  Vital Signs reviewed  GENERAL: Alert female no obvious distress, well-appearing.  Triage vitals reviewed and are benign.  HENT: nares patent  EYES: no scleral icterus  CV: regular rhythm, regular rate-no murmur  RESPIRATORY: normal effort, clear to auscultation bilaterally  ABDOMEN: soft  MUSCULOSKELETAL: no deformity  NEURO: Strength sensation and coordination are grossly intact.  Speech and mentation are unremarkable.  Cerebellar testing including finger-nose, rapid alternating movements heel-to-shin within normal limits.  SKIN: warm, dry      Disposition  I discussed treatment and evaluation of this patient with DAYANNA Dior.  I reviewed EKG labs and chest x-ray which are benign.  Cerebellar testing and neuro exam is relatively unremarkable.  We will go ahead and give her a \"migraine cocktail\" and check results of head CT and repeat troponin.  If she is feeling better and these tests are benign will likely discharge home with outpatient follow-up.       Faustino Olivares MD  02/14/23 2135    "

## 2023-04-20 ENCOUNTER — OFFICE VISIT (OUTPATIENT)
Dept: CARDIOLOGY | Facility: CLINIC | Age: 63
End: 2023-04-20
Payer: COMMERCIAL

## 2023-04-20 VITALS
HEIGHT: 65 IN | HEART RATE: 73 BPM | WEIGHT: 175 LBS | DIASTOLIC BLOOD PRESSURE: 80 MMHG | BODY MASS INDEX: 29.16 KG/M2 | SYSTOLIC BLOOD PRESSURE: 128 MMHG

## 2023-04-20 DIAGNOSIS — I49.1 PREMATURE ATRIAL CONTRACTIONS: Primary | ICD-10-CM

## 2023-04-20 RX ORDER — DILTIAZEM HYDROCHLORIDE 60 MG/1
TABLET, FILM COATED ORAL
Qty: 270 TABLET | Refills: 3 | Status: SHIPPED | OUTPATIENT
Start: 2023-04-20

## 2023-04-21 NOTE — PROGRESS NOTES
Date of Office Visit: 2023  Encounter Provider: ANOOP Kolb  Place of Service: [facility] CARDIOLOGY  Patient Name: Rosalinda Boone  :1960    Chief Complaint   Patient presents with   • Atrial Fibrillation     1 year follow up   :     HPI: Rosalinda Boone is a 63 y.o. female who followed with Dr. Samuels, now Dr. Hayward. She has a history of palpitations and premature ventricular contractions.     She has a history of palpitations going back several years. She felt like started around menopause. She has a lot of anxiety in regard to her palpitations which she feels makes them worse.     Saw Dr. Hayward last year. Offered reassurance and continue diltiazem.                 She presents today for follow up appt.     Over the past year she has been doing pretty well.     Continues to have occasional palpitations and feels like diltiazem helps.     EKG today shows NSR. No PVCs.     Currently on diltiazem 120mg in AM, 60mg PM. She tried taking less but palpitaitons worsened.     She has a lot of anxiety in regard to her palpitations. She feels like this makes them worse.           Past Medical History:   Diagnosis Date   • Arthritis     HANDS, BACK , FEET, NECK   • Asthma    • Bladder spasm    • Gastritis    • GERD (gastroesophageal reflux disease)    • Hematuria- has had neg urology workup    • IBS (irritable bowel syndrome)    • Migraine    • Palpitations     lov cardiology 2020   • Panic attacks    • Panic attacks    • PONV (postoperative nausea and vomiting)    • Premature atrial contractions    • Right sided abdominal pain    • S/P D&C (status post dilation and curettage) 2020   • Scoliosis    • Sleep apnea 2022   • SOB (shortness of breath)    • Spotting     SCHEDULED FOR D&C   • Status post laparoscopic hysterectomy 2020   • SVT (supraventricular tachycardia)     14 day Zio Patch starting on 17: 33 brief runs of SVT (longest 20 beats in  length at 153 bpm).    • Tingling     in hands    • Urinary tract infection        Past Surgical History:   Procedure Laterality Date   • COLONOSCOPY N/A 12/5/2016    NB,    • D & C HYSTEROSCOPY N/A 3/3/2020    Procedure: DILATATION AND CURETTAGE HYSTEROSCOPY, possible myosure;  Surgeon: Giana Paul MD;  Location: [facility];  Service: Obstetrics/Gynecology;  Laterality: N/A;  DILATION AND CURETTAGE HYSTEROSCOPY   • D & C WITH SUCTION      for DUB   • ENDOSCOPY N/A 12/5/2016    z line regular, 40 cm from incisors,  granular gastric mucosa, chronic gastritis   • LASIK     • OOPHORECTOMY     • TOTAL LAPAROSCOPIC HYSTERECTOMY N/A 11/17/2020    Procedure: TOTAL LAPAROSCOPIC HYSTERECTOMY with bilateral salpingoophorectomy;  Surgeon: Giana Paul MD;  Location: [facility];  Service: Obstetrics/Gynecology;  Laterality: N/A;       Social History     Socioeconomic History   • Marital status:      Spouse name: MATTIE ALMARAZ   • Number of children: 2   Tobacco Use   • Smoking status: Never   • Smokeless tobacco: Never   Vaping Use   • Vaping Use: Never used   Substance and Sexual Activity   • Alcohol use: Yes     Alcohol/week: 14.0 standard drinks     Types: 14 Glasses of wine per week     Comment: 2 glasses/nightly with dinner   • Drug use: No   • Sexual activity: Yes     Partners: Male     Birth control/protection: Post-menopausal, Surgical     Comment: [facility] for recurrent  VB       Family History   Problem Relation Age of Onset   • Cancer Father 63        lung-smoker   • Aneurysm Mother 73        AAA   • Leukemia Maternal Grandmother    • Aneurysm Maternal Grandfather         Cerebral   • Scleroderma Maternal Aunt    • Breast cancer Neg Hx    • Ovarian cancer Neg Hx    • Colon cancer Neg Hx    • Deep vein thrombosis Neg Hx    • Pulmonary embolism Neg Hx    • Malig Hyperthermia Neg Hx    • Uterine cancer Neg Hx        Review of Systems   Constitutional: Negative for chills, fever and  malaise/fatigue.   Cardiovascular: Negative for chest pain, dyspnea on exertion, leg swelling, near-syncope, orthopnea, palpitations, paroxysmal nocturnal dyspnea and syncope.   Respiratory: Negative for cough and shortness of breath.    Hematologic/Lymphatic: Negative.    Musculoskeletal: Negative for joint pain, joint swelling and myalgias.   Gastrointestinal: Negative for abdominal pain, diarrhea, melena, nausea and vomiting.   Genitourinary: Negative for frequency and hematuria.   Neurological: Negative for light-headedness, numbness, paresthesias and seizures.   Allergic/Immunologic: Negative.    All other systems reviewed and are negative.      Allergies   Allergen Reactions   • Codeine Dizziness   • Levaquin [Levofloxacin] GI Intolerance     FACIAL NUMBNESS   • Qvar [Beclomethasone] GI Intolerance     Tingling on one side of face   • Sulfa Antibiotics Hives   • Erythromycin Diarrhea   • Keflex [Cephalexin] Rash         Current Outpatient Medications:   •  ASMANEX, 30 METERED DOSES, 110 MCG/INH inhaler, Inhale 1-2 puffs Daily. Can use twice a day if needed, Disp: , Rfl:   •  azelastine (ASTEPRO) 0.15 % solution nasal spray, 1 spray into the nostril(s) as directed by provider Daily As Needed., Disp: , Rfl:   •  Biotin 64288 MCG tablet, Take 1 tablet by mouth Daily., Disp: , Rfl:   •  calcium carb-cholecalciferol 600-800 MG-UNIT tablet, Take 1 tablet by mouth Daily., Disp: , Rfl:   •  [place] Oil-Levomenthol (FDgard) 25-20.75 MG capsule, Take 2 capsules by mouth 3 (Three) Times a Day As Needed (stomach issues)., Disp: , Rfl:   •  desloratadine (CLARINEX) 5 MG tablet, Take 1 tablet by mouth Every Morning., Disp: , Rfl:   •  dilTIAZem (CARDIZEM) 60 MG tablet, Take 2 tablets by mouth Daily AND 1 tablet Every Evening., Disp: 270 tablet, Rfl: 3  •  estradiol (ESTRACE) 0.1 MG/GM vaginal cream, Insert 1 g into the vagina 2 (Two) Times a Week., Disp: 42.5 g, Rfl: 4  •  estradiol (MINIVELLE, VIVELLE-DOT) 0.05 MG/24HR  "patch, Place 1 patch on the skin as directed by provider 2 (Two) Times a Week., Disp: 24 patch, Rfl: 3  •  FLUTICASONE PROPIONATE, NASAL, NA, 1 spray into the nostril(s) as directed by provider Daily As Needed., Disp: , Rfl:   •  Lactobacillus-Inulin ([facility]) capsule, , Disp: , Rfl:   •  lansoprazole (PREVACID) 30 MG capsule, TAKE 1 CAPSULE TWICE A DAY, Disp: 180 capsule, Rfl: 3  •  montelukast (SINGULAIR) 10 MG tablet, Take 1 tablet by mouth Every Night., Disp: , Rfl:   •  Multiple Vitamin (MULTI VITAMIN DAILY PO), Take 2 tablets by mouth Daily., Disp: , Rfl:   •  Peppermint Oil (IBgard) 90 MG capsule controlled-release, Take 2 capsules by mouth 3 (Three) Times a Day As Needed (stomach issues). IBgard, Disp: , Rfl:   •  PROAIR  (90 BASE) MCG/ACT inhaler, Inhale 2 puffs Every 4 (Four) Hours As Needed., Disp: , Rfl:   •  Unable to find, 1 each 1 (One) Time. Relief Factor Take one packet per day., Disp: , Rfl:   •  uribel (URO-MP) 118 MG capsule capsule, Take 1 capsule by mouth 3 (Three) Times a Day As Needed., Disp: , Rfl:   •  Wheat Dextrin (BENEFIBER) powder, Take 1 each by mouth Daily., Disp: , Rfl:   •  XIIDRA 5 % solution, Administer 1 drop to both eyes 2 (Two) Times a Day., Disp: , Rfl:       Objective:     Vitals:    04/20/23 1434   BP: 128/80   Pulse: 73   Weight: 79.4 kg (175 lb)   Height: 165.1 cm (65\")     Body mass index is 29.12 kg/m².    PHYSICAL EXAM:    Vitals Reviewed.   General Appearance: No acute distress, well developed and well nourished.   Eyes: Conjunctiva and lids: No erythema, swelling, or discharge. Sclera non-icteric.   HENT: Atraumatic, normocephalic. External eyes, ears, and nose normal.   Respiratory: No signs of respiratory distress. Respiration rhythm and depth normal.   Clear to auscultation. No rales, crackles, rhonchi, or wheezing auscultated.   Cardiovascular:  Heart Rate and Rhythm: Normal, Heart Sounds: Normal S1 and S2. No S3 or S4 " noted.  Gastrointestinal:  Abdomen soft, non-distended, non-tender.   Musculoskeletal: Normal movement of extremities  Skin: Warm and dry.   Psychiatric: Patient alert and oriented to person, place, and time. Speech and behavior appropriate. Normal mood and affect.       ECG 12 Lead    Date/Time: 4/21/2023 11:24 AM  Performed by: Carlos Enrique Henry APRN  Authorized by: Carlos Enrique Henry APRN   Comparison: compared with previous ECG   Similar to previous ECG  Rhythm: sinus rhythm  BPM: 73                Assessment:       Diagnosis Plan   1. Premature atrial contractions  dilTIAZem (CARDIZEM) 60 MG tablet             Plan:   1-2. PAC, PVCs--- she has palpitations with associated PVCs. Less than 1% burden on monitor. She had brief SVT in the past lasting less than 5 seconds. I offered her reassurance about these rhythms. She feels like she is doing very well right now. Will continue diltiazem at current dose.       Follow up in one year.         As always, it has been a pleasure to participate in your patient's care.      Sincerely,         ANOOP Kolb

## 2023-05-26 ENCOUNTER — OFFICE VISIT (OUTPATIENT)
Dept: SLEEP MEDICINE | Facility: HOSPITAL | Age: 63
End: 2023-05-26
Payer: COMMERCIAL

## 2023-05-26 VITALS
BODY MASS INDEX: 29.49 KG/M2 | HEIGHT: 65 IN | WEIGHT: 177 LBS | DIASTOLIC BLOOD PRESSURE: 60 MMHG | HEART RATE: 85 BPM | SYSTOLIC BLOOD PRESSURE: 119 MMHG | OXYGEN SATURATION: 96 %

## 2023-05-26 DIAGNOSIS — G47.33 OBSTRUCTIVE SLEEP APNEA: Primary | ICD-10-CM

## 2023-05-26 DIAGNOSIS — I49.9 CARDIAC ARRHYTHMIA, UNSPECIFIED CARDIAC ARRHYTHMIA TYPE: ICD-10-CM

## 2023-05-26 PROCEDURE — G0463 HOSPITAL OUTPT CLINIC VISIT: HCPCS

## 2023-05-26 NOTE — PROGRESS NOTES
[facility]  Telephone: [phone] / Fax: [phone] [place]  Telephone: [phone] / Fax: [phone] Lisa Antonio    PCP: Shraddha Sosa MD    Reason for visit: HANSEL f/u    Rosalinda Boone is a 63 y.o.female  was last seen at [facility] sleep lab in November 2022. Pressures were recommended to be changed to 8-12cm H2O last visit but per her recent download her machine is still set at 5-15cm H2O. She feels that current pressures are comfortable. She wakes up at night due to hot flushes.  She has history of SVT. Since we started CPAP, tachycardia occurs much less frequently. She remains on the Diltiazem. She uses nasal pillow mask. It fits well.  She no longer uses a chin strap and has been able to train her mouth to stay closed. Her sleep schedule is 10pm-5am. ESS is 6. She wakes up usually feeling rested in the morning. She replaces CPAP accessories in regular intervals. Rosalinda has questions about inspire.    SH- no tobacco, 2 glasses of wine with dinner.    ROS- +nasal congestion, +post nasal drip, +cough, +GERD, +bloating, +anxiety    DME [facility]    Current Medications:    Current Outpatient Medications:   •  ASMANEX, 30 METERED DOSES, 110 MCG/INH inhaler, Inhale 1-2 puffs Daily. Can use twice a day if needed, Disp: , Rfl:   •  azelastine (ASTEPRO) 0.15 % solution nasal spray, 1 spray into the nostril(s) as directed by provider Daily As Needed., Disp: , Rfl:   •  Biotin 47287 MCG tablet, Take 1 tablet by mouth Daily., Disp: , Rfl:   •  calcium carb-cholecalciferol 600-800 MG-UNIT tablet, Take 1 tablet by mouth Daily., Disp: , Rfl:   •  [place] Oil-Levomenthol (FDgard) 25-20.75 MG capsule, Take 2 capsules by mouth 3 (Three) Times a Day As Needed (stomach issues)., Disp: , Rfl:   •  desloratadine (CLARINEX) 5 MG tablet, Take 1 tablet by mouth Every Morning., Disp: , Rfl:   •  dilTIAZem (CARDIZEM) 60 MG tablet, Take 2 tablets by mouth Daily AND 1 tablet Every Evening., Disp: 270 tablet, Rfl:  3  •  estradiol (ESTRACE) 0.1 MG/GM vaginal cream, Insert 1 g into the vagina 2 (Two) Times a Week., Disp: 42.5 g, Rfl: 4  •  estradiol (MINIVELLE, VIVELLE-DOT) 0.05 MG/24HR patch, Place 1 patch on the skin as directed by provider 2 (Two) Times a Week., Disp: 24 patch, Rfl: 3  •  FLUTICASONE PROPIONATE, NASAL, NA, 1 spray into the nostril(s) as directed by provider Daily As Needed., Disp: , Rfl:   •  Lactobacillus-Inulin ([facility]) capsule, , Disp: , Rfl:   •  lansoprazole (PREVACID) 30 MG capsule, TAKE 1 CAPSULE TWICE A DAY, Disp: 180 capsule, Rfl: 3  •  montelukast (SINGULAIR) 10 MG tablet, Take 1 tablet by mouth Every Night., Disp: , Rfl:   •  Multiple Vitamin (MULTI VITAMIN DAILY PO), Take 2 tablets by mouth Daily., Disp: , Rfl:   •  Peppermint Oil (IBgard) 90 MG capsule controlled-release, Take 2 capsules by mouth 3 (Three) Times a Day As Needed (stomach issues). IBgard, Disp: , Rfl:   •  PROAIR  (90 BASE) MCG/ACT inhaler, Inhale 2 puffs Every 4 (Four) Hours As Needed., Disp: , Rfl:   •  Unable to find, 1 each 1 (One) Time. Relief Factor Take one packet per day., Disp: , Rfl:   •  uribel (URO-MP) 118 MG capsule capsule, Take 1 capsule by mouth 3 (Three) Times a Day As Needed., Disp: , Rfl:   •  Wheat Dextrin (BENEFIBER) powder, Take 1 each by mouth Daily., Disp: , Rfl:   •  XIIDRA 5 % solution, Administer 1 drop to both eyes 2 (Two) Times a Day., Disp: , Rfl:    also entered in Sleep Questionnaire    Patient  has a past medical history of Arthritis, Asthma, Bladder spasm, Gastritis, GERD (gastroesophageal reflux disease), Hematuria- has had neg urology workup, IBS (irritable bowel syndrome), Migraine, Palpitations, Panic attacks, Panic attacks, PONV (postoperative nausea and vomiting), Premature atrial contractions, Right sided abdominal pain, S/P D&C (status post dilation and curettage) (03/03/2020), Scoliosis, Sleep apnea (09/16/2022), SOB (shortness of breath), Spotting, Status post  "laparoscopic hysterectomy (11/17/2020), SVT (supraventricular tachycardia), Tingling, and Urinary tract infection.    I have reviewed the Past Medical History, Past Surgical History, Social History and Family History.    Vital Signs /60   Pulse 85   Ht 165.1 cm (65\")   Wt 80.3 kg (177 lb)   LMP  (LMP Unknown)   SpO2 96%   BMI 29.45 kg/m²  Body mass index is 29.45 kg/m².    General Alert and oriented. No acute distress noted   Pharynx/Throat Class III  Mallampati airway, large tongue, no evidence of redundant lateral pharyngeal tissue. No oral lesions. No thrush. Moist mucous membranes.   Head Normocephalic. Symmetrical. Atraumatic.    Nose No septal deviation. No drainage   Chest Wall Normal shape. Symmetric expansion with respiration. No tenderness.   Neck Trachea midline, no thyromegaly or adenopathy    Lungs Clear to auscultation bilaterally. No wheezes. No rhonchi. No rales. Respirations regular, even and unlabored.   Heart Regular rhythm and normal rate. Normal S1 and S2. No murmur   Abdomen Soft, non-tender and non-distended. Normal bowel sounds. No masses.   Extremities Moves all extremities well. No edema   Psychiatric Normal mood and affect.     Testing:  · Download 2/24/23-5/24/23 100% use with average nightly use of 7 hours and 56 minutes on auto CPAP 5-15cm H2O, avg pr is 8cm H2O, AHI 0.7, leak of 18.7 L.min.    Study-Diagnostic data available to date is as below and was reviewed on current visit:  • 8/8/22  The patient tolerated the home sleep testing with monitoring time of 519 minutes. The data obtained make this a technically adequate study. The apnea hypopneas index(AHI) was 17.8 per sleep hour.  The AHI during supine position was - per sleep hour. Mean heart rate of 81.2 BPM.  Snoring was noted 0.7% of sleep time. Lowest oxygen saturation during the study was 78%. Saturation below 89% was noted for 70.4 mins.    Overnight oximetry on CPAP RA Nov 2022- showed no significant " desaturation.    Impression:  1. Obstructive sleep apnea    2. Cardiac arrhythmia, unspecified cardiac arrhythmia type- hx SVT          Plan:  Rosalinda is doing great. Current pressures are comfortable. We will keep her on 5-15cm H2O. Answered questions about inspire today. We discussed the process of insertion and inspire indications. As she is doing great on her CPAP, she will hold off on the inspire. She uses the CPAP device and benefits from its use in terms of reduction of hypersomnia and snoring.  AHI appears to be within adequate range. I reviewed download report and original sleep study report with the patient.     Weight loss will be strongly beneficial to reduce the severity of sleep-disordered breathing.  Caution during activities that require prolonged concentration is strongly advised if sleepiness returns.    Follow up with Dr. Gabriel in one year    Thank you for allowing me to participate in your patient's care.      ANOOP Garner  [facility]  Phone: [phone]      Part of this note may be an electronic transcription/translation of spoken language to printed text using the Dragon Dictation System.

## 2023-06-05 DIAGNOSIS — K21.9 GASTROESOPHAGEAL REFLUX DISEASE WITHOUT ESOPHAGITIS: ICD-10-CM

## 2023-06-05 RX ORDER — ESTRADIOL 0.1 MG/G
CREAM VAGINAL
Qty: 42.5 G | Refills: 1 | Status: SHIPPED | OUTPATIENT
Start: 2023-06-05

## 2023-06-06 RX ORDER — LANSOPRAZOLE 30 MG/1
CAPSULE, DELAYED RELEASE ORAL
Qty: 180 CAPSULE | Refills: 3 | OUTPATIENT
Start: 2023-06-06

## 2023-06-12 ENCOUNTER — TELEPHONE (OUTPATIENT)
Dept: OTHER | Facility: OTHER | Age: 63
End: 2023-06-12
Payer: COMMERCIAL

## 2023-06-12 DIAGNOSIS — K21.9 GASTROESOPHAGEAL REFLUX DISEASE WITHOUT ESOPHAGITIS: ICD-10-CM

## 2023-06-12 RX ORDER — LANSOPRAZOLE 30 MG/1
30 CAPSULE, DELAYED RELEASE ORAL 2 TIMES DAILY
Qty: 180 CAPSULE | Refills: 3 | Status: CANCELLED | OUTPATIENT
Start: 2023-06-12

## 2023-06-12 NOTE — TELEPHONE ENCOUNTER
Caller: MEREDITH ALMARAZ    Relationship: SELF    Best call back number: [phone]    Requested Prescriptions:   Requested Prescriptions     Pending Prescriptions Disp Refills   • lansoprazole (PREVACID) 30 MG capsule 180 capsule 3     Sig: Take 1 capsule by mouth 2 (Two) Times a Day.        Pharmacy where request should be sent:  [facility]; RX #[id number]; PHONE # [phone] FOR 90 DAY SUPPLY    Last office visit with prescribing clinician: Visit date not found      Next office visit with prescribing clinician: Visit date not found     Additional details provided by patient: SHE ONLY HAS ABOUT 3 WEEKS LEFT OF HER RX. NEEDS REFILL BEFORE VISIT WITH CHELA MACIAS    Does the patient have less than a 3 day supply:  [] Yes  [x] No    Would you like a call back once the refill request has been completed: [x] Yes [] No    If the office needs to give you a call back, can they leave a voicemail: [] Yes [] No    Vicky Rodriguez   06/12/23 15:16 EDT

## 2023-06-12 NOTE — TELEPHONE ENCOUNTER
Yuliana request for lansoprazole 30 mg 1 tab po 2x/day, #180, R3 attached to this message.     Pt last seen 4/16/21.  Cancelled appt of 5/21/21 and 5/9/23.  Upcoming appt 9/28/23 with TESHA De La Cruz.     Message to Dr Real.

## 2023-06-13 DIAGNOSIS — K21.9 GASTROESOPHAGEAL REFLUX DISEASE WITHOUT ESOPHAGITIS: ICD-10-CM

## 2023-06-13 RX ORDER — LANSOPRAZOLE 30 MG/1
30 CAPSULE, DELAYED RELEASE ORAL 2 TIMES DAILY
Qty: 180 CAPSULE | Refills: 0 | Status: SHIPPED | OUTPATIENT
Start: 2023-06-13

## 2023-06-13 NOTE — TELEPHONE ENCOUNTER
Caller: Rosalinda Boone    Relationship: Self    Best call back number: [phone]    Requested Prescriptions:   Requested Prescriptions     Pending Prescriptions Disp Refills    lansoprazole (PREVACID) 30 MG capsule 180 capsule 3     Sig: Take 1 capsule by mouth 2 (Two) Times a Day.        Pharmacy where request should be sent: [facility] [address] [phone] [facility] [phone]      Last office visit with prescribing clinician: 11/10/2022   Last telemedicine visit with prescribing clinician: Visit date not found   Next office visit with prescribing clinician: 11/14/2023     Additional details provided by patient: SHE CANNOT GET IN TO SEE HER GASTRO DR. TILL SEPTEMBER THEY SUGGESTED SHE CALL YOU TO SEE IF YOU CAN PRESCRIBE A 90 DAYS SUPPLY TO GET HER THROUGH.     Does the patient have less than a 3 day supply:  [x] Yes  [] No    Would you like a call back once the refill request has been completed: [] Yes [x] No    If the office needs to give you a call back, can they leave a voicemail: [] Yes [x] No    Vicky Brito Rep   06/13/23 08:51 EDT

## 2023-07-26 ENCOUNTER — TELEPHONE (OUTPATIENT)
Dept: INTERNAL MEDICINE | Facility: CLINIC | Age: 63
End: 2023-07-26

## 2023-07-26 NOTE — TELEPHONE ENCOUNTER
Caller: Rosalinda Boone    Relationship to patient: Self    Best call back number: [phone]    Patient is needing: PATIENT STATES THAT IT IS DAY 8 SINCE SHE HAD COVID AND ALL OF HER SYMPTOMS ARE GONE EXCEPT FOR COUGH AND ON OFF AGAIN DIARRHEA AND TODAY 7/26/23 SHE IS VERY DIZZY. PATIENT REQUESTING A CALLBACK FOR ADVICE ON WHAT TO DO. PLEASE ADVISE.

## 2023-07-27 ENCOUNTER — OFFICE VISIT (OUTPATIENT)
Dept: INTERNAL MEDICINE | Facility: CLINIC | Age: 63
End: 2023-07-27
Payer: COMMERCIAL

## 2023-07-27 VITALS
TEMPERATURE: 99 F | HEIGHT: 65 IN | WEIGHT: 167.6 LBS | SYSTOLIC BLOOD PRESSURE: 110 MMHG | OXYGEN SATURATION: 97 % | HEART RATE: 78 BPM | DIASTOLIC BLOOD PRESSURE: 60 MMHG | BODY MASS INDEX: 27.92 KG/M2

## 2023-07-27 DIAGNOSIS — J40 BRONCHITIS: Primary | ICD-10-CM

## 2023-07-27 PROCEDURE — 99213 OFFICE O/P EST LOW 20 MIN: CPT | Performed by: INTERNAL MEDICINE

## 2023-07-27 RX ORDER — BENZONATATE 200 MG/1
CAPSULE ORAL
COMMUNITY
Start: 2023-07-18

## 2023-07-27 RX ORDER — RED BEET 500 MG
CAPSULE ORAL
COMMUNITY
Start: 2023-07-24

## 2023-07-27 RX ORDER — AZITHROMYCIN 250 MG/1
TABLET, FILM COATED ORAL
Qty: 6 TABLET | Refills: 0 | Status: SHIPPED | OUTPATIENT
Start: 2023-07-27

## 2023-07-27 NOTE — PROGRESS NOTES
Subjective   Rosalinda Boone is a 63 y.o. female.   She was dx with covid     Cough    Dizziness  Associated symptoms include coughing.    She has had sx for 9 days   tested positive a week ago  She has a cough still   clear mucous  no fever no chills.  Still has fatigue    The following portions of the patient's history were reviewed and updated as appropriate: allergies, current medications, past family history, past medical history, past social history, past surgical history, and problem list.    Review of Systems   Respiratory:  Positive for cough.    Neurological:  Positive for dizziness.     Objective   Physical Exam  Vitals reviewed.   Constitutional:       Appearance: She is well-developed.   HENT:      Head: Normocephalic and atraumatic.      Right Ear: External ear normal.      Left Ear: External ear normal.   Eyes:      Conjunctiva/sclera: Conjunctivae normal.      Pupils: Pupils are equal, round, and reactive to light.   Neck:      Thyroid: No thyromegaly.      Trachea: No tracheal deviation.   Cardiovascular:      Rate and Rhythm: Normal rate and regular rhythm.      Heart sounds: Normal heart sounds.   Pulmonary:      Effort: Pulmonary effort is normal.      Breath sounds: Normal breath sounds.   Abdominal:      General: Bowel sounds are normal. There is no distension.      Palpations: Abdomen is soft.      Tenderness: There is no abdominal tenderness.   Musculoskeletal:         General: No deformity. Normal range of motion.      Cervical back: Normal range of motion.   Skin:     General: Skin is warm and dry.   Neurological:      Mental Status: She is alert and oriented to person, place, and time.   Psychiatric:         Behavior: Behavior normal.         Thought Content: Thought content normal.         Judgment: Judgment normal.       Vitals:    07/27/23 1137   BP: 110/60   Pulse: 78   Temp: 99 °F (37.2 °C)   SpO2: 97%     Body mass index is 27.89 kg/m².         Assessment & Plan   Diagnoses and all  orders for this visit:    1. Bronchitis (Primary)    Other orders  -     azithromycin (Zithromax Z-Gen) 250 MG tablet; Take 2 tablets by mouth on day 1, then 1 tablet daily on days 2-5  Dispense: 6 tablet; Refill: 0       Bronchitis-  recent covid infection but she does have a hx of asthma.  She will continue to use the inhalers and I encouraged drinking plenty of fluids.  She is starting to feel little better today.  I am going to give her an antibiotic as we are going into the weekend but she can hold off on taking that as long as she is gradually improving.

## 2023-08-05 NOTE — PROGRESS NOTES
"      Rosalinda Boone is a 60 y.o. patient who presents for follow up of   Chief Complaint   Patient presents with   • Follow-up     US     61 yo est pt here for emergency appt for  VB. She was placed on CombiPatch last year and had some postmenopausal bleeding.  She had an ultrasound that showed an endometrial lining of 0.89 cm and in March 2020 underwent a hysteroscopy D&C.  The pathology was benign.  She had a normal, atrophic appearing cavity. In 6/2020 we changed her to Vivelle-Dot 0.05 mg and Prometrium 200 mg.  Her ultrasound then showed a 5.6 cm uterus with an endometrial thickness of 1.23 cm and normal ovaries.she had an endo bx at that time that showed a possible B9 small polyp. She had some dizziness and so we decreased her dose to Vivelle dot 0.375 and Prometrium 100 mg. Her dizziness resolved and she feels much better. Her HF have improved and she has more energy, better sleep and less \"brain fog\". She does not want to go off HRT, but had another few days of VB this month. Her US today shows a 6.29 cm uterus with an EL 0.83 cm and a small cyst on the R ovary that is simple and measures 1.3 x 1 x 0.8 cm. We discussed that she may have a polyp that is causing her to spot, but she does not want another D&C or a hysterectomy and is reluctant to change her progesterone dose in case it causes the dizziness to recur.  We discussed that we can try a Mirena IUD as an option to help prevent uterine overgrowth, as she seems very sensitive to even small hormonal fluctuations. She is agreeable.     The following portions of the patient's history were reviewed and updated as appropriate: allergies, current medications and problem list.    Review of Systems   Constitutional: Positive for activity change (quarantine). Negative for appetite change, fatigue, fever and unexpected weight change.   Respiratory: Negative for cough and shortness of breath.    Cardiovascular: Negative for chest pain and palpitations. " "  Gastrointestinal: Negative for abdominal distention, abdominal pain, constipation, diarrhea and nausea.   Endocrine: Negative for cold intolerance and heat intolerance.   Genitourinary: Positive for vaginal bleeding. Negative for dyspareunia, dysuria, menstrual problem, pelvic pain and vaginal discharge.   Skin: Negative for color change and rash.   Allergic/Immunologic: Negative for environmental allergies and food allergies.   Neurological: Negative for dizziness and headaches.   Psychiatric/Behavioral: Negative for agitation, behavioral problems, dysphoric mood and sleep disturbance. The patient is not nervous/anxious.    All other systems reviewed and are negative.      /76   Ht 161.3 cm (63.5\")   Wt 79.8 kg (176 lb)   BMI 30.69 kg/m²     Physical Exam  Vitals signs and nursing note reviewed.   Constitutional:       Appearance: Normal appearance. She is well-developed. She is obese.   HENT:      Head: Normocephalic and atraumatic.   Eyes:      General: No scleral icterus.     Conjunctiva/sclera: Conjunctivae normal.   Neck:      Musculoskeletal: Neck supple.      Thyroid: No thyromegaly.   Abdominal:      General: There is no distension.      Palpations: Abdomen is soft. There is no mass.      Tenderness: There is no abdominal tenderness. There is no guarding or rebound.      Hernia: No hernia is present.   Skin:     General: Skin is warm and dry.   Neurological:      Mental Status: She is alert and oriented to person, place, and time.   Psychiatric:         Behavior: Behavior normal.         Thought Content: Thought content normal.         Judgment: Judgment normal.         A/P:  1.  VB on HRT- pt had normal D&C and normal endo bx in 2/2020 and 6/2020 respectively. Feels she has good symptom control on Vivelle dot 0.0375 and Prometrium 100 mg but is still spotting. Declines TLH or D&C. Order Mirena IUD and do end bx at placement. Discussed with patient risks, benefits and alternatives of IUD use " including, but not limited to: infections, irregular bleeding, expulsion, embedded devices and uterine perforation.  Patient is advised to check her string monthly and to return to the office yearly for a string check by a clinician. .        Assessment/Plan   Rosalinda was seen today for follow-up.    Diagnoses and all orders for this visit:    Postmenopausal bleeding    Hormone replacement therapy (HRT)                 No follow-ups on file.      Giana Paul MD    9/30/2020  19:24 EDT   No

## 2023-09-02 DIAGNOSIS — K21.9 GASTROESOPHAGEAL REFLUX DISEASE WITHOUT ESOPHAGITIS: ICD-10-CM

## 2023-09-04 RX ORDER — LANSOPRAZOLE 30 MG/1
CAPSULE, DELAYED RELEASE ORAL
Qty: 180 CAPSULE | Refills: 3 | Status: SHIPPED | OUTPATIENT
Start: 2023-09-04

## 2023-10-17 ENCOUNTER — TELEPHONE (OUTPATIENT)
Dept: CARDIOLOGY | Facility: CLINIC | Age: 63
End: 2023-10-17
Payer: COMMERCIAL

## 2023-10-17 ENCOUNTER — OFFICE VISIT (OUTPATIENT)
Dept: GASTROENTEROLOGY | Facility: CLINIC | Age: 63
End: 2023-10-17
Payer: COMMERCIAL

## 2023-10-17 VITALS
HEIGHT: 65 IN | BODY MASS INDEX: 28.71 KG/M2 | OXYGEN SATURATION: 96 % | HEART RATE: 74 BPM | DIASTOLIC BLOOD PRESSURE: 59 MMHG | TEMPERATURE: 96.6 F | SYSTOLIC BLOOD PRESSURE: 107 MMHG | WEIGHT: 172.3 LBS

## 2023-10-17 DIAGNOSIS — K21.9 GASTROESOPHAGEAL REFLUX DISEASE, UNSPECIFIED WHETHER ESOPHAGITIS PRESENT: Primary | ICD-10-CM

## 2023-10-17 DIAGNOSIS — K58.2 IRRITABLE BOWEL SYNDROME WITH BOTH CONSTIPATION AND DIARRHEA: ICD-10-CM

## 2023-10-17 RX ORDER — PANTOPRAZOLE SODIUM 40 MG/1
40 TABLET, DELAYED RELEASE ORAL 2 TIMES DAILY
Qty: 180 TABLET | Refills: 0 | Status: SHIPPED | OUTPATIENT
Start: 2023-10-17 | End: 2024-01-15

## 2023-10-17 RX ORDER — ALUMINUM ZIRCONIUM OCTACHLOROHYDREX GLY 16 G/100G
1 GEL TOPICAL DAILY
COMMUNITY

## 2023-10-17 NOTE — PROGRESS NOTES
"Chief Complaint  Irritable bowel syndrome with diarrhea    Subjective          History of Present Illness    Rosalinda Boone is a  63 y.o. female patient of Dr. Logan, she is new to me.  She was last seen seen by ANOOP Khan April 16, 2021 for heartburn and irritable bowel syndrome.  She was doing well on Prevacid 30 mg twice daily for her GERD. Today She reports increasing GERD every day. Is taking the Prevacid 30 min before eating breakfast and is trying to follow a GERD diet.   Had been taking Culturelle and switched to Align a week ago. She reports a lot of abdominal pain and bowels are alternating between constipation and loose. She also reports bloating depending on the symptoms. She also reports taking IB  or FD Guard every day and sometimes she takes both. Reports drinking 2- 8 ounce cups of coffee daily and a glass of Tea from [employer]. Does not smoke but does drink a glass of wine at dinner.     She denies any dysphagia, does report occasional nausea without vomiting, The FD Guard does control this when it happens. .  She admits her appetite is good and her weight  is down 6-7 pounds about 3-4 months, just doing portion control.  Her last EGD and colonoscopy was on 12/2016.  Her next screening colonoscopy will be due on 12/2026.           Objective   Vital Signs:   /59   Pulse 74   Temp 96.6 °F (35.9 °C)   Ht 165.1 cm (65\")   Wt 78.2 kg (172 lb 4.8 oz)   SpO2 96%   BMI 28.67 kg/m²       Physical Exam  Constitutional:       Appearance: Normal appearance.   Pulmonary:      Effort: Pulmonary effort is normal. No respiratory distress.   Abdominal:      General: Abdomen is flat. Bowel sounds are normal. There is no distension.      Palpations: Abdomen is soft. There is no mass.      Tenderness: There is no abdominal tenderness. There is no guarding or rebound.      Hernia: No hernia is present.   Skin:     General: Skin is warm and dry.   Neurological:      Mental Status: She is alert. "   Psychiatric:         Mood and Affect: Mood normal.          Result Review :   The following data was reviewed by: ANOOP Davenport on 10/17/2023:  CMP          11/3/2022    08:36 2/14/2023    19:46   CMP   Glucose 90  91    BUN 11  13    Creatinine 0.75  0.65    EGFR  99.7    Sodium 137  138    Potassium 4.1  3.8    Chloride 102  104    Calcium 8.9  9.2    Total Protein 6.4     Total Protein  6.7    Albumin 4.20  4.3    Globulin 2.2     Globulin  2.4    Total Bilirubin 0.2  0.3    Alkaline Phosphatase 62  59    AST (SGOT) 19  21    ALT (SGPT) 12  18    Albumin/Globulin Ratio  1.8    BUN/Creatinine Ratio 14.7  20.0    Anion Gap  9.0      CBC w/diff          11/3/2022    08:36 2/14/2023    19:46   CBC w/Diff   WBC 7.45  13.77    RBC 4.27  4.22    Hemoglobin 13.7  13.8    Hematocrit 40.8  39.3    MCV 95.6  93.1    MCH 32.1  32.7    MCHC 33.6  35.1    RDW 11.8  12.0    Platelets 297  311    Neutrophil Rel %  74.2    Immature Granulocyte Rel %  0.4    Lymphocyte Rel %  17.6    Monocyte Rel %  6.2    Eosinophil Rel %  0.9    Basophil Rel %  0.7            Assessment and Plan    Diagnoses and all orders for this visit:    1. Gastroesophageal reflux disease, unspecified whether esophagitis present (Primary)    2. Irritable bowel syndrome with both constipation and diarrhea    Other orders  -     pantoprazole (PROTONIX) 40 MG EC tablet; Take 1 tablet by mouth 2 (Two) Times a Day for 90 days.  Dispense: 180 tablet; Refill: 0    Patient felt that she was not responding any more to the Prevacid and thought maybe changing the medication may work better for her. We will switch to Protonix 40 BID with plans to be on this for 8 weeks and then try backing off to 20 mg BID and see how she feels on that.    For her IBS she should continue the probiotic and continue the fiber supplementation. OK to continue FD and IB Guard as needed. Will see how things are in 1 month.     Follow Up   Return in about 4 weeks (around 11/14/2023), or  if symptoms worsen or fail to improve, for Will see how new PPI is working for her and see what labs are gotten from Primary Care..    Dragon dictation used throughout this note.     Debora Knox, APRN

## 2023-10-17 NOTE — Clinical Note
Worsening of GERD symptoms gas and bloating. Feels like the Prevacid isn't effective anymore and wanted to switch to a different med. I switched her to Protonix 40 BID and have her following up in 1 month. She would prefer not to do an EGD/ C/S if she doesn't have to. She is due in Dec of 2026. We can see how she does after the switch in med in a month and if still symptomatic proceed with bi-directional scopes?

## 2023-10-17 NOTE — TELEPHONE ENCOUNTER
Pt called in this morning.  States that she had an episode yesterday that was different from her usual SVT/fluttering episodes.  Yesterday AM (before she took her morning diltiazem), she experienced a 2-3 second feeling like her heart had stopped beating.  She states that she felt faint, lightheaded and SOA at the time.  She states that it took about 15 min for the lightheadedness to go away, but the other symptoms resolved very quickly.  She did not check HR/BP at the time.      She also states that she was very active on Sunday, and had taken a long walk with lots of hills.  She had no episodes this day, but was concerned that it might have caused the episode on Monday.    Any recommendation.    Thanks so much,  Rain Rao, RN  Triage RN  10/17/23 11:27 EDT

## 2023-11-01 DIAGNOSIS — K21.9 GASTROESOPHAGEAL REFLUX DISEASE WITHOUT ESOPHAGITIS: ICD-10-CM

## 2023-11-01 DIAGNOSIS — E78.5 HYPERLIPIDEMIA, UNSPECIFIED HYPERLIPIDEMIA TYPE: ICD-10-CM

## 2023-11-01 DIAGNOSIS — Z00.00 HEALTH CARE MAINTENANCE: ICD-10-CM

## 2023-11-03 LAB
25(OH)D3+25(OH)D2 SERPL-MCNC: 54.4 NG/ML (ref 30–100)
ALBUMIN SERPL-MCNC: 4.5 G/DL (ref 3.5–5.2)
ALBUMIN/GLOB SERPL: 2.1 G/DL
ALP SERPL-CCNC: 57 U/L (ref 39–117)
ALT SERPL-CCNC: 15 U/L (ref 1–33)
AST SERPL-CCNC: 18 U/L (ref 1–32)
BASOPHILS # BLD AUTO: 0.07 10*3/MM3 (ref 0–0.2)
BASOPHILS NFR BLD AUTO: 1 % (ref 0–1.5)
BILIRUB SERPL-MCNC: 0.4 MG/DL (ref 0–1.2)
BUN SERPL-MCNC: 10 MG/DL (ref 8–23)
BUN/CREAT SERPL: 14.3 (ref 7–25)
CALCIUM SERPL-MCNC: 9.3 MG/DL (ref 8.6–10.5)
CHLORIDE SERPL-SCNC: 105 MMOL/L (ref 98–107)
CHOLEST SERPL-MCNC: 235 MG/DL (ref 0–200)
CO2 SERPL-SCNC: 27.6 MMOL/L (ref 22–29)
CREAT SERPL-MCNC: 0.7 MG/DL (ref 0.57–1)
EGFRCR SERPLBLD CKD-EPI 2021: 97.3 ML/MIN/1.73
EOSINOPHIL # BLD AUTO: 0.1 10*3/MM3 (ref 0–0.4)
EOSINOPHIL NFR BLD AUTO: 1.4 % (ref 0.3–6.2)
ERYTHROCYTE [DISTWIDTH] IN BLOOD BY AUTOMATED COUNT: 11.7 % (ref 12.3–15.4)
GLOBULIN SER CALC-MCNC: 2.1 GM/DL
GLUCOSE SERPL-MCNC: 92 MG/DL (ref 65–99)
HBA1C MFR BLD: 5.3 % (ref 4.8–5.6)
HCT VFR BLD AUTO: 41.2 % (ref 34–46.6)
HDLC SERPL-MCNC: 84 MG/DL (ref 40–60)
HGB BLD-MCNC: 14 G/DL (ref 12–15.9)
IMM GRANULOCYTES # BLD AUTO: 0.02 10*3/MM3 (ref 0–0.05)
IMM GRANULOCYTES NFR BLD AUTO: 0.3 % (ref 0–0.5)
LDLC SERPL CALC-MCNC: 119 MG/DL (ref 0–100)
LDLC/HDLC SERPL: 1.35 {RATIO}
LYMPHOCYTES # BLD AUTO: 2.18 10*3/MM3 (ref 0.7–3.1)
LYMPHOCYTES NFR BLD AUTO: 29.7 % (ref 19.6–45.3)
MCH RBC QN AUTO: 32 PG (ref 26.6–33)
MCHC RBC AUTO-ENTMCNC: 34 G/DL (ref 31.5–35.7)
MCV RBC AUTO: 94.1 FL (ref 79–97)
MONOCYTES # BLD AUTO: 0.54 10*3/MM3 (ref 0.1–0.9)
MONOCYTES NFR BLD AUTO: 7.4 % (ref 5–12)
NEUTROPHILS # BLD AUTO: 4.43 10*3/MM3 (ref 1.7–7)
NEUTROPHILS NFR BLD AUTO: 60.2 % (ref 42.7–76)
NRBC BLD AUTO-RTO: 0 /100 WBC (ref 0–0.2)
PLATELET # BLD AUTO: 343 10*3/MM3 (ref 140–450)
POTASSIUM SERPL-SCNC: 4.2 MMOL/L (ref 3.5–5.2)
PROT SERPL-MCNC: 6.6 G/DL (ref 6–8.5)
RBC # BLD AUTO: 4.38 10*6/MM3 (ref 3.77–5.28)
SODIUM SERPL-SCNC: 140 MMOL/L (ref 136–145)
TRIGL SERPL-MCNC: 189 MG/DL (ref 0–150)
TSH SERPL DL<=0.005 MIU/L-ACNC: 1.66 UIU/ML (ref 0.27–4.2)
VLDLC SERPL CALC-MCNC: 32 MG/DL (ref 5–40)
WBC # BLD AUTO: 7.34 10*3/MM3 (ref 3.4–10.8)

## 2023-11-14 ENCOUNTER — OFFICE VISIT (OUTPATIENT)
Dept: INTERNAL MEDICINE | Facility: CLINIC | Age: 63
End: 2023-11-14
Payer: COMMERCIAL

## 2023-11-14 VITALS
OXYGEN SATURATION: 97 % | DIASTOLIC BLOOD PRESSURE: 72 MMHG | WEIGHT: 173.4 LBS | HEIGHT: 65 IN | BODY MASS INDEX: 28.89 KG/M2 | TEMPERATURE: 98.2 F | SYSTOLIC BLOOD PRESSURE: 120 MMHG | HEART RATE: 75 BPM

## 2023-11-14 DIAGNOSIS — Z00.00 HEALTH CARE MAINTENANCE: Primary | ICD-10-CM

## 2023-11-14 DIAGNOSIS — R42 DIZZINESS: ICD-10-CM

## 2023-11-14 DIAGNOSIS — R31.9 HEMATURIA, UNSPECIFIED TYPE: ICD-10-CM

## 2023-11-14 LAB
BILIRUB BLD-MCNC: NEGATIVE MG/DL
CLARITY, POC: CLEAR
COLOR UR: YELLOW
EXPIRATION DATE: ABNORMAL
GLUCOSE UR STRIP-MCNC: NEGATIVE MG/DL
KETONES UR QL: NEGATIVE
LEUKOCYTE EST, POC: ABNORMAL
Lab: ABNORMAL
NITRITE UR-MCNC: NEGATIVE MG/ML
PH UR: 6 [PH] (ref 5–8)
PROT UR STRIP-MCNC: NEGATIVE MG/DL
RBC # UR STRIP: ABNORMAL /UL
SP GR UR: 1 (ref 1–1.03)
UROBILINOGEN UR QL: ABNORMAL

## 2023-11-14 NOTE — PROGRESS NOTES
Subjective   Rosalinda Almaraz is a 63 y.o. female and is here for a comprehensive physical exam. The patient reports problems - int light headedness for 1 month .  She thinks the sx started after she saw GI and was started on a ppi  she stopped with and the sx did not change  She deneis any urinary sx    Pt is UTD with annual gyn exam and mammo     Do you take any herbs or supplements that were not prescribed by a doctor?       Social History: no tob no etoh  Social History     Socioeconomic History    Marital status:      Spouse name: MATTIE ALMARAZ    Number of children: 2   Tobacco Use    Smoking status: Never    Smokeless tobacco: Never   Vaping Use    Vaping Use: Never used   Substance and Sexual Activity    Alcohol use: Yes     Alcohol/week: 1.0 - 2.0 standard drink of alcohol     Types: 1 - 2 Glasses of wine per week     Comment: 2 glasses/nightly with dinner    Drug use: No    Sexual activity: Yes     Partners: Male     Birth control/protection: Post-menopausal, Hysterectomy     Comment: I am on hormone therapy.       Family History:   Family History   Problem Relation Age of Onset    Cancer Father 63        lung-smoker    Aneurysm Mother 73        AAA    Leukemia Maternal Grandmother     Aneurysm Maternal Grandfather         Cerebral    Scleroderma Maternal Aunt     Breast cancer Neg Hx     Ovarian cancer Neg Hx     Colon cancer Neg Hx     Deep vein thrombosis Neg Hx     Pulmonary embolism Neg Hx     Malig Hyperthermia Neg Hx     Uterine cancer Neg Hx        Past Medical History:   Past Medical History:   Diagnosis Date    Arthritis     HANDS, BACK , FEET, NECK    Asthma     Bladder spasm     Gastritis     GERD (gastroesophageal reflux disease)     Hematuria- has had neg urology workup     IBS (irritable bowel syndrome)     Lactose intolerance a couple of years ago    Migraine     Palpitations     lov cardiology 7/2020    Panic attacks     Panic attacks     PONV (postoperative nausea and vomiting)      "Premature atrial contractions     Right sided abdominal pain     S/P D&C (status post dilation and curettage) 03/03/2020    Scoliosis     Sleep apnea 09/16/2022    SOB (shortness of breath)     Spotting     SCHEDULED FOR D&C    Status post laparoscopic hysterectomy 11/17/2020    SVT (supraventricular tachycardia)     14 day Zio Patch starting on 12/22/17: 33 brief runs of SVT (longest 20 beats in length at 153 bpm).     Tingling     in hands     Urinary tract infection            Review of Systems    Pertinent items are noted in HPI.    Objective   /72 (BP Location: Left arm, Patient Position: Sitting)   Pulse 75   Temp 98.2 °F (36.8 °C) (Oral)   Ht 165.1 cm (65\")   Wt 78.7 kg (173 lb 6.4 oz)   LMP  (LMP Unknown)   SpO2 97%   BMI 28.86 kg/m²     General Appearance:    Alert, cooperative, no distress, appears stated age   Head:    Normocephalic, without obvious abnormality, atraumatic   Eyes:    PERRL, conjunctiva/corneas clear, EOM's intact, fundi     benign, both eyes   Ears:    Normal TM's and external ear canals, both ears   Nose:   Nares normal, septum midline, mucosa normal, no drainage    or sinus tenderness   Throat:   Lips, mucosa, and tongue normal; teeth and gums normal   Neck:   Supple, symmetrical, trachea midline, no adenopathy;     thyroid:  no enlargement/tenderness/nodules; no carotid    bruit or JVD   Back:     Symmetric, no curvature, ROM normal, no CVA tenderness   Lungs:     Clear to auscultation bilaterally, respirations unlabored   Chest Wall:    No tenderness or deformity    Heart:    Regular rate and rhythm, S1 and S2 normal, no murmur, rub   or gallop       Abdomen:     Soft, non-tender, bowel sounds active all four quadrants,     no masses, no organomegaly           Extremities:   Extremities normal, atraumatic, no cyanosis or edema   Pulses:   2+ and symmetric all extremities   Skin:   Skin color, texture, turgor normal, no rashes or lesions   Lymph nodes:   Cervical, " supraclavicular, and axillary nodes normal   Neurologic:   CNII-XII intact, normal strength, sensation and reflexes     throughout       Vitals:    11/14/23 1450   BP: 120/72   Pulse: 75   Temp: 98.2 °F (36.8 °C)   SpO2: 97%     Body mass index is 28.86 kg/m².      Medications:   Current Outpatient Medications:     ASMANEX, 30 METERED DOSES, 110 MCG/INH inhaler, Inhale 1-2 puffs Daily. Can use twice a day if needed, Disp: , Rfl:     azelastine (ASTEPRO) 0.15 % solution nasal spray, 1 spray into the nostril(s) as directed by provider Daily As Needed., Disp: , Rfl:     Biotin 98960 MCG tablet, Take 1 tablet by mouth Daily., Disp: , Rfl:     calcium carb-cholecalciferol 600-800 MG-UNIT tablet, Take 1 tablet by mouth Daily., Disp: , Rfl:     Gwinner Oil-Levomenthol (FDgard) 25-20.75 MG capsule, Take 2 capsules by mouth 3 (Three) Times a Day As Needed (stomach issues)., Disp: , Rfl:     desloratadine (CLARINEX) 5 MG tablet, Take 1 tablet by mouth Every Morning., Disp: , Rfl:     dilTIAZem (CARDIZEM) 60 MG tablet, Take 2 tablets by mouth Daily AND 1 tablet Every Evening., Disp: 270 tablet, Rfl: 3    estradiol (ESTRACE) 0.1 MG/GM vaginal cream, INSERT 1 GRAM VAGINALLY TWICE A WEEK (Patient taking differently: 1 (One) Time Per Week.), Disp: 42.5 g, Rfl: 1    estradiol (MINIVELLE, VIVELLE-DOT) 0.05 MG/24HR patch, Place 1 patch on the skin as directed by provider 2 (Two) Times a Week., Disp: 24 patch, Rfl: 3    FLUTICASONE PROPIONATE, NASAL, NA, 1 spray into the nostril(s) as directed by provider Daily As Needed., Disp: , Rfl:     Misc Natural Products (Sambucus Elderberry Immune) chewable tablet, , Disp: , Rfl:     montelukast (SINGULAIR) 10 MG tablet, Take 1 tablet by mouth Every Night., Disp: , Rfl:     Multiple Vitamin (MULTI VITAMIN DAILY PO), Take 2 tablets by mouth Daily., Disp: , Rfl:     pantoprazole (PROTONIX) 40 MG EC tablet, Take 1 tablet by mouth 2 (Two) Times a Day for 90 days., Disp: 180 tablet, Rfl: 0     Peppermint Oil (IBgard) 90 MG capsule controlled-release, Take 2 capsules by mouth 3 (Three) Times a Day As Needed (stomach issues). IBgard, Disp: , Rfl:     PROAIR  (90 BASE) MCG/ACT inhaler, Inhale 2 puffs Every 4 (Four) Hours As Needed., Disp: , Rfl:     Probiotic Product (Align) capsule, Take 1 capsule by mouth Daily., Disp: , Rfl:     Unable to find, 1 each 1 (One) Time. Relief Factor Take one packet per day., Disp: , Rfl:     Wheat Dextrin (BENEFIBER) powder, Take 1 each by mouth Daily., Disp: , Rfl:     XIIDRA 5 % solution, Administer 1 drop to both eyes 2 (Two) Times a Day., Disp: , Rfl:     BMI is >= 25 and <30. (Overweight) The following options were offered after discussion;: exercise counseling/recommendations and nutrition counseling/recommendations        Assessment & Plan   Healthy female exam.      1. Healthcare Maintenance:  2. Patient Counseling:  --Nutrition: Stressed importance of moderation in sodium/caffeine intake, saturated fat and cholesterol, caloric balance, sufficient intake of fresh fruits, vegetables, fiber, calcium and vit D  --Exercise: she does exercise reg  --Substance Abuse:  no tob no etoh  --Dental health: she does go to the dentist reg  --Immunizations reviewed.no tob no etoh  --Discussed benefits of screening colonoscopy.  3.  Dizziness-  ear exam benign  rec using NSS sx do not sound like BPV    she may also add an antihistamoine  4.  Leukosuria- check a UA  we will check a cx   5.  Asthma-  stable with steroid inhaler and singulair

## 2023-11-14 NOTE — PROGRESS NOTES
"Chief Complaint  Heartburn and Irritable Bowel Syndrome    Subjective          History of Present Illness    Rosalinda Boone is a  63 y.o. female patient of Dr. Real's last seen by me 10/17/2023 for GERD and IBS. She was switched to Protonix 40 BID with plans to be on this dose for 8 weeks and then back off to 20 BID to see if she will tolerate the lower dose. In addition she was to continue the probiotic and fiber supplementation and use FD and IB Guard as needed.     She is here to follow up. Today she reports her symptoms are:improved on the Protonix but had gone back to the Prilose. She also reports the   Align seems to be helping with her bloating. Taking Benefiber and having BM every other day to every day. She reports things are improving.Still using FD and IB Guard regularly.   Last colonoscopy was in 12/2016 not due again until 12/2026    Objective   Vital Signs:   /67 (BP Location: Left arm, Patient Position: Sitting, Cuff Size: Adult)   Pulse 80   Temp 97.4 °F (36.3 °C) (Temporal)   Ht 165.1 cm (65\")   Wt 78.3 kg (172 lb 11.2 oz)   BMI 28.74 kg/m²       Physical Exam     Result Review :             Assessment and Plan    Diagnoses and all orders for this visit:    1. Gastroesophageal reflux disease, unspecified whether esophagitis present (Primary)    2. Irritable bowel syndrome with both constipation and diarrhea    Patient reports she would prefer to go back to Protonix, she says she has some at home and will let us know when she needs a refill.   Continue Align Probiotic and increase fiber to 20-25 grams daily        Follow Up   Return in about 3 months (around 2/15/2024), or if symptoms worsen or fail to improve.    Dragon dictation used throughout this note.     Debora Knox, APRYOANA   "

## 2023-11-15 ENCOUNTER — OFFICE VISIT (OUTPATIENT)
Dept: GASTROENTEROLOGY | Facility: CLINIC | Age: 63
End: 2023-11-15
Payer: COMMERCIAL

## 2023-11-15 VITALS
HEART RATE: 80 BPM | HEIGHT: 65 IN | BODY MASS INDEX: 28.78 KG/M2 | TEMPERATURE: 97.4 F | DIASTOLIC BLOOD PRESSURE: 67 MMHG | WEIGHT: 172.7 LBS | SYSTOLIC BLOOD PRESSURE: 105 MMHG

## 2023-11-15 DIAGNOSIS — K21.9 GASTROESOPHAGEAL REFLUX DISEASE, UNSPECIFIED WHETHER ESOPHAGITIS PRESENT: Primary | ICD-10-CM

## 2023-11-15 DIAGNOSIS — K58.2 IRRITABLE BOWEL SYNDROME WITH BOTH CONSTIPATION AND DIARRHEA: ICD-10-CM

## 2023-11-17 LAB
BACTERIA UR CULT: NORMAL
BACTERIA UR CULT: NORMAL

## 2023-12-08 ENCOUNTER — HOSPITAL ENCOUNTER (OUTPATIENT)
Dept: MAMMOGRAPHY | Facility: HOSPITAL | Age: 63
Discharge: HOME | End: 2023-12-08
Admitting: OBSTETRICS & GYNECOLOGY
Payer: COMMERCIAL

## 2023-12-08 DIAGNOSIS — Z12.31 ENCOUNTER FOR SCREENING MAMMOGRAM FOR MALIGNANT NEOPLASM OF BREAST: ICD-10-CM

## 2023-12-08 PROCEDURE — 77063 BREAST TOMOSYNTHESIS BI: CPT

## 2023-12-08 PROCEDURE — 77067 SCR MAMMO BI INCL CAD: CPT

## 2023-12-27 RX ORDER — ESTRADIOL 0.05 MG/D
PATCH, EXTENDED RELEASE TRANSDERMAL
Qty: 24 PATCH | Refills: 0 | Status: SHIPPED | OUTPATIENT
Start: 2023-12-27

## 2024-01-15 RX ORDER — ESTRADIOL 0.05 MG/D
PATCH, EXTENDED RELEASE TRANSDERMAL
Qty: 24 PATCH | Refills: 0 | Status: SHIPPED | OUTPATIENT
Start: 2024-01-15

## 2024-01-15 RX ORDER — ESTRADIOL 0.1 MG/G
1 CREAM VAGINAL WEEKLY
Qty: 42.5 G | Refills: 1 | Status: SHIPPED | OUTPATIENT
Start: 2024-01-15

## 2024-01-15 NOTE — TELEPHONE ENCOUNTER
Caller: Paolo Rosalinda    Relationship: Self    Best call back number: [phone]    Requested Prescriptions:   Requested Prescriptions     Pending Prescriptions Disp Refills    estradiol (ESTRACE) 0.1 MG/GM vaginal cream 42.5 g 1    estradiol (MINIVELLE, VIVELLE-DOT) 0.05 MG/24HR patch 24 patch 0        Pharmacy where request should be sent:  [facility]    Last office visit with prescribing clinician: 1/11/2023   Last telemedicine visit with prescribing clinician: Visit date not found   Next office visit with prescribing clinician: 6/26/2024     Additional details provided by patient:     Does the patient have less than a 3 day supply:  [] Yes  [x] No    Would you like a call back once the refill request has been completed: [x] Yes [] No    If the office needs to give you a call back, can they leave a voicemail: [x] Yes [] No    Kortney Means, RegSched Rep   01/15/24 08:28 EST

## 2024-01-16 ENCOUNTER — TELEPHONE (OUTPATIENT)
Dept: GASTROENTEROLOGY | Facility: CLINIC | Age: 64
End: 2024-01-16
Payer: COMMERCIAL

## 2024-01-16 RX ORDER — PANTOPRAZOLE SODIUM 40 MG/1
40 TABLET, DELAYED RELEASE ORAL DAILY
Qty: 90 TABLET | Refills: 0 | Status: SHIPPED | OUTPATIENT
Start: 2024-01-16 | End: 2024-04-15

## 2024-01-16 NOTE — TELEPHONE ENCOUNTER
REcieved fax from [facility] [employer] requesting refill on pantoprazole 40mg.     Called pt and she confirms she does need refill on pantoprazole 40mg po daily #90.  She state that [facility]. Message sent to Dr Real.

## 2024-02-15 ENCOUNTER — OFFICE VISIT (OUTPATIENT)
Dept: GASTROENTEROLOGY | Facility: CLINIC | Age: 64
End: 2024-02-15
Payer: COMMERCIAL

## 2024-02-15 VITALS
TEMPERATURE: 96.7 F | DIASTOLIC BLOOD PRESSURE: 73 MMHG | SYSTOLIC BLOOD PRESSURE: 114 MMHG | WEIGHT: 172.2 LBS | HEART RATE: 72 BPM | BODY MASS INDEX: 30.51 KG/M2 | HEIGHT: 63 IN

## 2024-02-15 DIAGNOSIS — K21.9 GASTROESOPHAGEAL REFLUX DISEASE, UNSPECIFIED WHETHER ESOPHAGITIS PRESENT: Primary | ICD-10-CM

## 2024-02-15 DIAGNOSIS — K58.2 IRRITABLE BOWEL SYNDROME WITH BOTH CONSTIPATION AND DIARRHEA: ICD-10-CM

## 2024-02-15 RX ORDER — PANTOPRAZOLE SODIUM 40 MG/1
40 TABLET, DELAYED RELEASE ORAL DAILY
Qty: 90 TABLET | Refills: 3 | Status: SHIPPED | OUTPATIENT
Start: 2024-02-15

## 2024-03-14 RX ORDER — ESTRADIOL 0.1 MG/G
CREAM VAGINAL
Qty: 42.5 G | Refills: 1 | Status: SHIPPED | OUTPATIENT
Start: 2024-03-14

## 2024-03-14 RX ORDER — ESTRADIOL 0.05 MG/D
PATCH, EXTENDED RELEASE TRANSDERMAL
Qty: 24 PATCH | Refills: 0 | Status: SHIPPED | OUTPATIENT
Start: 2024-03-14

## 2024-03-20 ENCOUNTER — TELEPHONE (OUTPATIENT)
Dept: CARDIOLOGY | Facility: CLINIC | Age: 64
End: 2024-03-20

## 2024-03-20 DIAGNOSIS — R00.2 HEART PALPITATIONS: Primary | ICD-10-CM

## 2024-03-20 NOTE — TELEPHONE ENCOUNTER
Caller: Rosalinda Boone    Relationship: Self    Best call back number:  [phone]      PATIENT CALLED IN ADVISING SHE HAS BEEN EXPERIENCING SVT, HEART RACING FREQUENTLY AND WANTS TO KNOW IF MAYBE SHE SHOULD HAVE A HOLTER MONITOR BEFORE HER FOLLOW UP     Rinvoq Pregnancy And Lactation Text: Based on animal studies, Rinvoq may cause embryo-fetal harm when administered to pregnant women.  The medication should not be used in pregnancy.  Breastfeeding is not recommended during treatment and for 6 days after the last dose.

## 2024-03-20 NOTE — TELEPHONE ENCOUNTER
3/20/24    I called and left a voicemail for the patient to get scheduled for the monitor.    Thanks  Shira

## 2024-03-26 ENCOUNTER — TELEPHONE (OUTPATIENT)
Dept: GASTROENTEROLOGY | Facility: CLINIC | Age: 64
End: 2024-03-26
Payer: COMMERCIAL

## 2024-03-26 NOTE — TELEPHONE ENCOUNTER
----- Message from Rosalinda Boone sent at 3/25/2024  4:57 PM EDT -----  Regarding: pantoprazole refill ?  Contact: [phone]  Hi Sarah,   I saw Stephy for my last check up appt but I am emailing you, because I see that she is out of the office.  [facility] has my pantoprazole refill messed up because it says that I cannot order until 3/23 and it will ship by 4/1.  I actually am out of the med as of 3/23.  I wanted to ask if I can take Lanzaprozole until my order arrives?  I was formerly on the lanzaprozole and still have some left.  It was not working too well anymore and I was switched to pantaprozole which is working much better.  I figured that I needed to take something during the week that I am waiting [facility] finally [facility] medication.  What are your thoughts?    Thank you so much!   Rosalinda Boone  [phone]

## 2024-03-26 NOTE — TELEPHONE ENCOUNTER
My chart message from pt that she has been taking the pantoprazole BID.  I have sent her a message to see if her symptoms have not been controlled by the daily dosing.

## 2024-04-23 ENCOUNTER — OFFICE VISIT (OUTPATIENT)
Age: 64
End: 2024-04-23
Payer: COMMERCIAL

## 2024-04-23 VITALS
HEART RATE: 70 BPM | BODY MASS INDEX: 30.65 KG/M2 | WEIGHT: 173 LBS | SYSTOLIC BLOOD PRESSURE: 130 MMHG | DIASTOLIC BLOOD PRESSURE: 80 MMHG | HEIGHT: 63 IN

## 2024-04-23 DIAGNOSIS — R00.2 HEART PALPITATIONS: Primary | ICD-10-CM

## 2024-04-23 PROCEDURE — 99213 OFFICE O/P EST LOW 20 MIN: CPT | Performed by: INTERNAL MEDICINE

## 2024-04-23 PROCEDURE — 93000 ELECTROCARDIOGRAM COMPLETE: CPT | Performed by: INTERNAL MEDICINE

## 2024-04-23 NOTE — PROGRESS NOTES
Date of Office Visit: 2024  Encounter Provider: Rogelio Hayward MD  Place of Service: [facility] CARDIOLOGY  Patient Name: Rosalinda Boone  :1960    Chief Complaint   Patient presents with    PACs    PVCs   :     HPI: Rosalinda Boone is a 64 y.o. female who presents today for palpitations.     She reports palpitations, brief, lasting seconds.      She has some occasional dyspnea.      Symptoms do cause some anxiety.     No episodes lasted minutes or hours.     She worries about risk of stroke or heart attack          Past Medical History:   Diagnosis Date    Arthritis     HANDS, BACK , FEET, NECK    Asthma     Bladder spasm     Gastritis     GERD (gastroesophageal reflux disease)     Hematuria- has had neg urology workup     IBS (irritable bowel syndrome)     Lactose intolerance a couple of years ago    Migraine     Palpitations     lov cardiology 2020    Panic attacks     Panic attacks     PONV (postoperative nausea and vomiting)     Premature atrial contractions     Right sided abdominal pain     S/P D&C (status post dilation and curettage) 2020    Scoliosis     Sleep apnea 2022    SOB (shortness of breath)     Spotting     SCHEDULED FOR D&C    Status post laparoscopic hysterectomy 2020    SVT (supraventricular tachycardia)     14 day Zio Patch starting on 17: 33 brief runs of SVT (longest 20 beats in length at 153 bpm).     Tingling     in hands     Urinary tract infection        Past Surgical History:   Procedure Laterality Date    COLONOSCOPY N/A 2016    NBIH,     D & C HYSTEROSCOPY N/A 2020    Procedure: DILATATION AND CURETTAGE HYSTEROSCOPY, possible myosure;  Surgeon: Giana Paul MD;  Location: [facility];  Service: Obstetrics/Gynecology;  Laterality: N/A;  DILATION AND CURETTAGE HYSTEROSCOPY    D & C WITH SUCTION      for DUB    ENDOSCOPY N/A 2016    z line regular, 40 cm from incisors,  granular gastric mucosa,  chronic gastritis    LASIK      OOPHORECTOMY      TOTAL LAPAROSCOPIC HYSTERECTOMY N/A 11/17/2020    Procedure: TOTAL LAPAROSCOPIC HYSTERECTOMY with bilateral salpingoophorectomy;  Surgeon: Giana Paul MD;  Location: [facility];  Service: Obstetrics/Gynecology;  Laterality: N/A;    UPPER GASTROINTESTINAL ENDOSCOPY  12/5/2016       Social History     Socioeconomic History    Marital status:      Spouse name: MATTIE ALMARAZ    Number of children: 2   Tobacco Use    Smoking status: Never    Smokeless tobacco: Never   Vaping Use    Vaping status: Never Used   Substance and Sexual Activity    Alcohol use: Yes     Alcohol/week: 1.0 - 2.0 standard drink of alcohol     Types: 1 - 2 Glasses of wine per week     Comment: 2 glasses/nightly with dinner    Drug use: No    Sexual activity: Yes     Partners: Male     Birth control/protection: Post-menopausal, Hysterectomy     Comment: I am on hormone therapy.       Family History   Problem Relation Age of Onset    Cancer Father 63        lung-smoker    Aneurysm Mother 73        AAA    Leukemia Maternal Grandmother     Aneurysm Maternal Grandfather         Cerebral    Scleroderma Maternal Aunt     Breast cancer Neg Hx     Ovarian cancer Neg Hx     Colon cancer Neg Hx     Deep vein thrombosis Neg Hx     Pulmonary embolism Neg Hx     Malig Hyperthermia Neg Hx     Uterine cancer Neg Hx        Review of Systems   Constitutional: Negative.   Cardiovascular: Negative.    Respiratory: Negative.     Gastrointestinal: Negative.        Allergies   Allergen Reactions    Codeine Dizziness    Levaquin [Levofloxacin] GI Intolerance     FACIAL NUMBNESS    Qvar [Beclomethasone] GI Intolerance     Tingling on one side of face    Sulfa Antibiotics Hives    Erythromycin Diarrhea    Keflex [Cephalexin] Rash         Current Outpatient Medications:     ASMANEX, 30 METERED DOSES, 110 MCG/INH inhaler, Inhale 1-2 puffs Daily. Can use twice a day if needed, Disp: , Rfl:     azelastine  (ASTEPRO) 0.15 % solution nasal spray, 1 spray into the nostril(s) as directed by provider Daily As Needed., Disp: , Rfl:     Biotin 54516 MCG tablet, Take 1 tablet by mouth Daily., Disp: , Rfl:     calcium carb-cholecalciferol 600-800 MG-UNIT tablet, Take 1 tablet by mouth Daily., Disp: , Rfl:     Estelita Oil-Levomenthol (FDgard) 25-20.75 MG capsule, Take 2 capsules by mouth 3 (Three) Times a Day As Needed (stomach issues)., Disp: , Rfl:     desloratadine (CLARINEX) 5 MG tablet, Take 1 tablet by mouth Every Morning., Disp: , Rfl:     dilTIAZem (CARDIZEM) 60 MG tablet, Take 2 tablets by mouth Daily AND 1 tablet Every Evening. (Patient taking differently: TAKES 1 TAB BID), Disp: 270 tablet, Rfl: 3    estradiol (ESTRACE) 0.1 MG/GM vaginal cream, INSERT 1 GRAM VAGINALLY TWICE A WEEK, Disp: 42.5 g, Rfl: 1    estradiol (MINIVELLE, VIVELLE-DOT) 0.05 MG/24HR patch, PLACE 1 PATCH ON THE SKIN AS DIRECTED BY PROVIDER 2 TIMES A WEEK, Disp: 24 patch, Rfl: 0    FLUTICASONE PROPIONATE, NASAL, NA, 1 spray into the nostril(s) as directed by provider Daily As Needed., Disp: , Rfl:     Misc Natural Products (Sambucus Elderberry Immune) chewable tablet, , Disp: , Rfl:     montelukast (SINGULAIR) 10 MG tablet, Take 1 tablet by mouth Every Night., Disp: , Rfl:     Multiple Vitamin (MULTI VITAMIN DAILY PO), Take 2 tablets by mouth Daily., Disp: , Rfl:     pantoprazole (PROTONIX) 40 MG EC tablet, Take 1 tablet by mouth Daily., Disp: 90 tablet, Rfl: 3    Peppermint Oil (IBgard) 90 MG capsule controlled-release, Take 2 capsules by mouth 3 (Three) Times a Day As Needed (stomach issues). IBgard, Disp: , Rfl:     PROAIR  (90 BASE) MCG/ACT inhaler, Inhale 2 puffs Every 4 (Four) Hours As Needed., Disp: , Rfl:     Probiotic Product (Align) capsule, Take 1 capsule by mouth Daily., Disp: , Rfl:     Unable to find, 1 each 1 (One) Time. Relief Factor Take one packet per day., Disp: , Rfl:     Wheat Dextrin (BENEFIBER) powder, Take 1 each by  "mouth Daily., Disp: , Rfl:     XIIDRA 5 % solution, Administer 1 drop to both eyes 2 (Two) Times a Day., Disp: , Rfl:       Objective:     Vitals:    04/23/24 1356   BP: 130/80   Pulse: 70   Weight: 78.5 kg (173 lb)   Height: 160 cm (63\")     Body mass index is 30.65 kg/m².    PHYSICAL EXAM:    Vitals and nursing note reviewed.   Constitutional:       General: Not in acute distress.     Appearance: Healthy appearance.   Pulmonary:      Effort: Pulmonary effort is normal. No respiratory distress.   Cardiovascular:      Normal rate. Regular rhythm.   Edema:     Peripheral edema absent.   Skin:     General: Skin is warm and dry.   Neurological:      Mental Status: Alert and oriented to person, place, and time.   Psychiatric:         Behavior: Behavior normal.         Thought Content: Thought content normal.         Judgment: Judgment normal.             ECG 12 Lead    Date/Time: 4/23/2024 4:19 PM  Performed by: Rogelio Hayward MD    Authorized by: Rogelio Hayward MD  Comparison: compared with previous ECG from 4/20/2023  Similar to previous ECG  Rhythm: sinus rhythm            Assessment:       Diagnosis Plan   1. Heart palpitations               Plan:       I don't feel that symptoms are consistent with atrial fibrillation or new arrhythmia    Continue diltiazem     If symptoms worsen, repeat monitor.     As always, it has been a pleasure to participate in your patient's care.      Sincerely,         Rogelio Hayward MD  "

## 2024-05-01 DIAGNOSIS — I49.1 PREMATURE ATRIAL CONTRACTIONS: ICD-10-CM

## 2024-05-02 RX ORDER — DILTIAZEM HYDROCHLORIDE 60 MG/1
TABLET, FILM COATED ORAL
Qty: 270 TABLET | Refills: 3 | Status: SHIPPED | OUTPATIENT
Start: 2024-05-02

## 2024-05-14 ENCOUNTER — OFFICE VISIT (OUTPATIENT)
Dept: SLEEP MEDICINE | Facility: HOSPITAL | Age: 64
End: 2024-05-14
Payer: COMMERCIAL

## 2024-05-14 VITALS
WEIGHT: 175 LBS | HEART RATE: 79 BPM | BODY MASS INDEX: 29.16 KG/M2 | HEIGHT: 65 IN | SYSTOLIC BLOOD PRESSURE: 110 MMHG | OXYGEN SATURATION: 97 % | DIASTOLIC BLOOD PRESSURE: 59 MMHG

## 2024-05-14 DIAGNOSIS — G47.33 OBSTRUCTIVE SLEEP APNEA, ADULT: Primary | ICD-10-CM

## 2024-05-14 DIAGNOSIS — E66.3 OVERWEIGHT (BMI 25.0-29.9): ICD-10-CM

## 2024-05-14 PROCEDURE — G0463 HOSPITAL OUTPT CLINIC VISIT: HCPCS

## 2024-05-14 NOTE — PROGRESS NOTES
[facility] SLEEP MEDICINE  [address]  [place] [address] [phone]    PCP: Shraddha Sosa MD    Reason for visit:  Sleep disorders: HANSEL    Rosalinda is a 64 y.o.female who was seen in the [facility] Disorders Center today. Annual fu. She is compliant with CPAP and pr feels OK. Sleeps from 10pm to 5am. Using nasal pillows and fits well. Occasional dry mouth. Wakes up mostly rested.  [place] Sleepiness Scale is 8. Caffeine 3 per day. Alcohol 3 per week.    Rosalinda  reports that she has never smoked. She has never used smokeless tobacco.    Pertinent Positive Review of Systems of pnd, acid reflux, abdominal bloating, anxiety  Rest of Review of Systems was negative as recorded in Sleep Questionnaire.    Patient  has a past medical history of Arthritis, Asthma, Bladder spasm, Gastritis, GERD (gastroesophageal reflux disease), Hematuria- has had neg urology workup, IBS (irritable bowel syndrome), Lactose intolerance (a couple of years ago), Migraine, Palpitations, Panic attacks, Panic attacks, PONV (postoperative nausea and vomiting), Premature atrial contractions, Right sided abdominal pain, S/P D&C (status post dilation and curettage) (03/03/2020), Scoliosis, Sleep apnea (09/16/2022), SOB (shortness of breath), Spotting, Status post laparoscopic hysterectomy (11/17/2020), SVT (supraventricular tachycardia), Tingling, and Urinary tract infection.     Current Medications:    Current Outpatient Medications:     ASMANEX, 30 METERED DOSES, 110 MCG/INH inhaler, Inhale 1-2 puffs Daily. Can use twice a day if needed, Disp: , Rfl:     azelastine (ASTEPRO) 0.15 % solution nasal spray, 1 spray into the nostril(s) as directed by provider Daily As Needed., Disp: , Rfl:     Biotin 13683 MCG tablet, Take 1 tablet by mouth Daily., Disp: , Rfl:     calcium carb-cholecalciferol 600-800 MG-UNIT tablet, Take 1 tablet by mouth Daily., Disp: , Rfl:     Estelita Oil-Levomenthol (FDgard) 25-20.75 MG capsule, Take 2  "capsules by mouth 3 (Three) Times a Day As Needed (stomach issues)., Disp: , Rfl:     desloratadine (CLARINEX) 5 MG tablet, Take 1 tablet by mouth Every Morning., Disp: , Rfl:     dilTIAZem (CARDIZEM) 60 MG tablet, TAKE 2 TABLETS DAILY AND 1 TABLET EVERY EVENING, Disp: 270 tablet, Rfl: 3    estradiol (ESTRACE) 0.1 MG/GM vaginal cream, INSERT 1 GRAM VAGINALLY TWICE A WEEK, Disp: 42.5 g, Rfl: 1    estradiol (MINIVELLE, VIVELLE-DOT) 0.05 MG/24HR patch, PLACE 1 PATCH ON THE SKIN AS DIRECTED BY PROVIDER 2 TIMES A WEEK, Disp: 24 patch, Rfl: 0    FLUTICASONE PROPIONATE, NASAL, NA, 1 spray into the nostril(s) as directed by provider Daily As Needed., Disp: , Rfl:     Misc Natural Products (Sambucus Elderberry Immune) chewable tablet, , Disp: , Rfl:     montelukast (SINGULAIR) 10 MG tablet, Take 1 tablet by mouth Every Night., Disp: , Rfl:     Multiple Vitamin (MULTI VITAMIN DAILY PO), Take 2 tablets by mouth Daily., Disp: , Rfl:     pantoprazole (PROTONIX) 40 MG EC tablet, Take 1 tablet by mouth Daily., Disp: 90 tablet, Rfl: 3    Peppermint Oil (IBgard) 90 MG capsule controlled-release, Take 2 capsules by mouth 3 (Three) Times a Day As Needed (stomach issues). IBgard, Disp: , Rfl:     PROAIR  (90 BASE) MCG/ACT inhaler, Inhale 2 puffs Every 4 (Four) Hours As Needed., Disp: , Rfl:     Probiotic Product (Align) capsule, Take 1 capsule by mouth Daily., Disp: , Rfl:     Unable to find, 1 each 1 (One) Time. Relief Factor Take one packet per day., Disp: , Rfl:     Wheat Dextrin (BENEFIBER) powder, Take 1 each by mouth Daily., Disp: , Rfl:     XIIDRA 5 % solution, Administer 1 drop to both eyes 2 (Two) Times a Day., Disp: , Rfl:    also entered in Sleep Questionnaire         Vital Signs: /59   Pulse 79   Ht 165.1 cm (65\")   Wt 79.4 kg (175 lb)   LMP  (LMP Unknown)   SpO2 97%   BMI 29.12 kg/m²     Body mass index is 29.12 kg/m².       Tongue: Normal       Dentition: good       Pharynx: Posterior pharyngeal pillars " "are wide   Mallampatti: II (hard and soft palate, upper portion of tonsils anduvula visible)        General: Alert. Cooperative. Well developed. No acute distress.             Head:  Normocephalic. Symmetrical. Atraumatic.              Nose: No septal deviation. No drainage.          Throat: No oral lesions. No thrush. Moist mucous membranes.    Chest Wall:  Normal shape. Symmetric expansion with respiration. No tenderness.             Neck:  Trachea midline.           Lungs:  Clear to auscultation bilaterally. No wheezes. No rhonchi. No rales. Respirations regular, even and unlabored.            Heart:  Regular rhythm and normal rate. Normal S1 and S2. No murmur.     Abdomen:  Soft, non-tender and non-distended. Normal bowel sounds. No masses.  Extremities:  Moves all extremities well. No edema.    Psychiatric: Normal mood and affect.    Diagnostic data available to date is as below and was reviewed on current visit:  8/8/22  The patient tolerated the home sleep testing with monitoring time of 519 minutes. The data obtained make this a technically adequate study. The apnea hypopneas index(AHI) was 17.8 per sleep hour.  The AHI during supine position was - per sleep hour. Mean heart rate of 81.2 BPM.  Snoring was noted 0.7% of sleep time. Lowest oxygen saturation during the study was 78%. Saturation below 89% was noted for 70.4 mins.     No results found for: \"IRON\", \"TIBC\", \"FERRITIN\"    Most current available usage data reviewed on 05/14/2024:        [employer]: [employer]    Prescription to [facility] for replacement supplies as below:    nasal pillow      Description Replacement    Nasal PILLOWS     x A 7034 Nasal Pillows  every 3 mth   x A 7033 Repl Nasal Pillows  2 per mth    Nasal MASK/CUSHION      A 7034 Nasal Mask/Cushion  every 3 mth    A 7032 Repl Nasal Mask/Cushion  2 per mth    Full Face MASK      A 7030 Full Face Mask  every 3 mth    A 7031 Repl Face Mask  1 per mth      A 4604 Heated Tubing  every 3 mth    A " 7037 Standard Tubing  every 3 mth   x A 7035 Headgear  every 3 mth   x A 7046 Repl Humidifier Chamber  every 6 yrs   x A 7038 Disposable Filters  2 per mth   x A 7039 Non-disposable Filter  every 6 mth   x A 7036 Chin Strap  every 6 mth     Orders Placed This Encounter   Procedures    Pulmonary Results Scan          Impression:  1. Obstructive sleep apnea, adult    2. Overweight (BMI 25.0-29.9)        Plan:  Rosalinda is fully compliant with her device.  She finds it effective.  She does not feel that the abdominal bloating is occurring as a result of her CPAP.  She will continue with current pressure settings.    I reiterated the importance of effective treatment of obstructive sleep apnea with PAP machine.  Cardiovascular health risks of untreated sleep apnea were again reviewed.  Patient was asked to remain cautious if there is persistent hypersomnolence. The benefit of weight loss in reducing severity of obstructive sleep apnea was discussed.  Patient would benefit from adhering to a strict diet to achieve ideal BMI.     Change of PAP supplies regularly is important for effective use.  Change of cushion on the mask or plugs on nasal pillows along with disposable filters once every month and change of mask frame, tubing, headgear and Velcro straps every 6 months at the minimum was reiterated.    This patient is compliant with PAP machine and benefits from its use.  Apnea hypopneas index is corrected/improved.  Daytime hypersomnolence has resolved.     Patient will follow up in this clinic in 1 year APRN    Thank you for allowing me to participate in your patient's care.    Electronically signed by Brodie Gabriel MD, 05/14/24, 2:03 PM EDT.    Part of this note may be an electronic transcription/translation of spoken language to printed text using the Dragon Dictation System.

## 2024-06-03 RX ORDER — ESTRADIOL 0.05 MG/D
PATCH, EXTENDED RELEASE TRANSDERMAL
Qty: 24 PATCH | Refills: 0 | Status: SHIPPED | OUTPATIENT
Start: 2024-06-03

## 2024-08-28 ENCOUNTER — OFFICE VISIT (OUTPATIENT)
Dept: OBSTETRICS AND GYNECOLOGY | Facility: CLINIC | Age: 64
End: 2024-08-28
Payer: COMMERCIAL

## 2024-08-28 VITALS
WEIGHT: 173 LBS | BODY MASS INDEX: 28.82 KG/M2 | DIASTOLIC BLOOD PRESSURE: 72 MMHG | HEIGHT: 65 IN | SYSTOLIC BLOOD PRESSURE: 112 MMHG

## 2024-08-28 DIAGNOSIS — Z01.419 CERVICAL SMEAR, AS PART OF ROUTINE GYNECOLOGICAL EXAMINATION: Primary | ICD-10-CM

## 2024-08-28 DIAGNOSIS — Z13.820 SCREENING FOR OSTEOPOROSIS: ICD-10-CM

## 2024-08-28 DIAGNOSIS — N95.2 VAGINAL ATROPHY: ICD-10-CM

## 2024-08-28 DIAGNOSIS — L72.3 SEBACEOUS CYST: ICD-10-CM

## 2024-08-28 DIAGNOSIS — N94.2 VAGINISMUS: ICD-10-CM

## 2024-08-28 DIAGNOSIS — Z01.419 ROUTINE GYNECOLOGICAL EXAMINATION: ICD-10-CM

## 2024-08-28 DIAGNOSIS — Z12.31 ENCOUNTER FOR SCREENING MAMMOGRAM FOR MALIGNANT NEOPLASM OF BREAST: ICD-10-CM

## 2024-08-28 DIAGNOSIS — Z11.51 SCREENING FOR HUMAN PAPILLOMAVIRUS: ICD-10-CM

## 2024-08-28 RX ORDER — ESTRADIOL 0.04 MG/D
1 PATCH, EXTENDED RELEASE TRANSDERMAL 2 TIMES WEEKLY
Qty: 24 PATCH | Refills: 3 | Status: SHIPPED | OUTPATIENT
Start: 2024-08-29

## 2024-08-28 RX ORDER — AZELASTINE HYDROCHLORIDE 137 UG/1
SPRAY, METERED NASAL
COMMUNITY
Start: 2024-05-22

## 2024-08-28 RX ORDER — ESTRADIOL 0.1 MG/G
1 CREAM VAGINAL 2 TIMES DAILY
Qty: 42.5 G | Refills: 5 | Status: SHIPPED | OUTPATIENT
Start: 2024-08-28

## 2024-08-28 NOTE — PROGRESS NOTES
GYN Annual Exam     CC- Here for annual exam.     Rosalinda Boone is a 64 y.o. female est pt who presents for annual well woman exam. She had TLH/OC for  VB in 2020 and she is happy with her results. She is on Vivelle dot 0.05 mg and Estrace cream vaginally. We discussed decreasing her dose of Vivelle dot and she is agreeable. She denies bladder issues or  VB..  She has a h/o benign hematuria and sees urology regularly.  She is having some pain with sex and has also noticed more sebaceous cysts on her L labia and we discussed removal in the office vs the OR and she would like to have it scheduled in the office. She had a stress fracture in her foot.     OB History          2    Para   2    Term   2            AB        Living   2         SAB        IAB        Ectopic        Molar        Multiple        Live Births              Obstetric Comments   2                Menarche:13  Menopause:52  HRT:yes since   Current contraception: 2020- TLH/BSO for  VB  History of abnormal Pap smear: no  History of abnormal mammogram: no  Family history of uterine, colon or ovarian cancer: no  Family history of breast cancer: no  STD's: none  Last pap smear:2022- nl pap/HPV  ELISABETH: neg  B9 hematuria      Health Maintenance   Topic Date Due    Pneumococcal Vaccine 0-64 (1 of 2 - PCV) Never done    Annual Gynecologic Pelvic and Breast Exam  2024    COVID-19 Vaccine (3 - - season) 2024    INFLUENZA VACCINE  2024    DXA SCAN  10/21/2024    TDAP/TD VACCINES (1 - Tdap) 2024 (Originally 3/30/1979)    ZOSTER VACCINE (1 of 2) 2024 (Originally 3/30/2010)    LIPID PANEL  2024    ANNUAL PHYSICAL  2024    BMI FOLLOWUP  2025    MAMMOGRAM  2025    COLORECTAL CANCER SCREENING  2026    PAP SMEAR  2027    HEPATITIS C SCREENING  Completed       Past Medical History:   Diagnosis Date    Arthritis     HANDS, BACK , FEET, NECK    Asthma     Bladder  spasm     Gastritis     GERD (gastroesophageal reflux disease)     Hematuria- has had neg urology workup     IBS (irritable bowel syndrome)     Lactose intolerance a couple of years ago    Migraine     Osteopenia     Palpitations     lov cardiology 7/2020    Panic attacks     Panic attacks     PONV (postoperative nausea and vomiting)     Premature atrial contractions     Right sided abdominal pain     S/P D&C (status post dilation and curettage) 03/03/2020    Scoliosis     Sleep apnea 09/16/2022    SOB (shortness of breath)     Spotting     SCHEDULED FOR D&C    Status post laparoscopic hysterectomy 11/17/2020    SVT (supraventricular tachycardia)     14 day Zio Patch starting on 12/22/17: 33 brief runs of SVT (longest 20 beats in length at 153 bpm).     Tingling     in hands     Urinary tract infection        Past Surgical History:   Procedure Laterality Date    COLONOSCOPY N/A 12/05/2016    NBIH,     D & C HYSTEROSCOPY N/A 03/03/2020    Procedure: DILATATION AND CURETTAGE HYSTEROSCOPY, possible myosure;  Surgeon: Giana Paul MD;  Location: [facility];  Service: Obstetrics/Gynecology;  Laterality: N/A;  DILATION AND CURETTAGE HYSTEROSCOPY    D & C WITH SUCTION      for DUB    ENDOSCOPY N/A 12/05/2016    z line regular, 40 cm from incisors,  granular gastric mucosa, chronic gastritis    LASIK      OOPHORECTOMY      TOTAL LAPAROSCOPIC HYSTERECTOMY N/A 11/17/2020    Procedure: TOTAL LAPAROSCOPIC HYSTERECTOMY with bilateral salpingoophorectomy;  Surgeon: Giana Paul MD;  Location: [facility];  Service: Obstetrics/Gynecology;  Laterality: N/A;    UPPER GASTROINTESTINAL ENDOSCOPY  12/5/2016         Current Outpatient Medications:     Azelastine HCl 137 MCG/SPRAY solution, , Disp: , Rfl:     estradiol (ESTRACE) 0.1 MG/GM vaginal cream, Insert 1 g into the vagina 2 (Two) Times a Day., Disp: 42.5 g, Rfl: 5    ASMANEX, 30 METERED DOSES, 110 MCG/INH inhaler, Inhale 1-2 puffs Daily. Can use twice a day  if needed, Disp: , Rfl:     Biotin 60219 MCG tablet, Take 1 tablet by mouth Daily., Disp: , Rfl:     calcium carb-cholecalciferol 600-800 MG-UNIT tablet, Take 1 tablet by mouth Daily., Disp: , Rfl:     Estelita Oil-Levomenthol (FDgard) 25-20.75 MG capsule, Take 2 capsules by mouth 3 (Three) Times a Day As Needed (stomach issues)., Disp: , Rfl:     desloratadine (CLARINEX) 5 MG tablet, Take 1 tablet by mouth Every Morning., Disp: , Rfl:     dilTIAZem (CARDIZEM) 60 MG tablet, TAKE 2 TABLETS DAILY AND 1 TABLET EVERY EVENING, Disp: 270 tablet, Rfl: 3    estradiol (VIVELLE-DOT) 0.0375 MG/24HR patch, Place 1 patch on the skin as directed by provider 2 (Two) Times a Week., Disp: 24 patch, Rfl: 3    FLUTICASONE PROPIONATE, NASAL, NA, 1 spray into the nostril(s) as directed by provider Daily As Needed., Disp: , Rfl:     montelukast (SINGULAIR) 10 MG tablet, Take 1 tablet by mouth Every Night., Disp: , Rfl:     Multiple Vitamin (MULTI VITAMIN DAILY PO), Take 2 tablets by mouth Daily., Disp: , Rfl:     pantoprazole (PROTONIX) 40 MG EC tablet, Take 1 tablet by mouth Daily., Disp: 90 tablet, Rfl: 3    Peppermint Oil (IBgard) 90 MG capsule controlled-release, Take 2 capsules by mouth 3 (Three) Times a Day As Needed (stomach issues). IBgard, Disp: , Rfl:     PROAIR  (90 BASE) MCG/ACT inhaler, Inhale 2 puffs Every 4 (Four) Hours As Needed., Disp: , Rfl:     Probiotic Product (Align) capsule, Take 1 capsule by mouth Daily., Disp: , Rfl:     Unable to find, 1 each 1 (One) Time. Relief Factor Take one packet per day., Disp: , Rfl:     Wheat Dextrin (BENEFIBER) powder, Take 1 each by mouth Daily., Disp: , Rfl:     XIIDRA 5 % solution, Administer 1 drop to both eyes 2 (Two) Times a Day., Disp: , Rfl:     Allergies   Allergen Reactions    Codeine Dizziness    Levaquin [Levofloxacin] GI Intolerance     FACIAL NUMBNESS    Qvar [Beclomethasone] GI Intolerance     Tingling on one side of face    Sulfa Antibiotics Hives    Erythromycin  "Diarrhea    Keflex [Cephalexin] Rash       Social History     Tobacco Use    Smoking status: Never    Smokeless tobacco: Never   Vaping Use    Vaping status: Never Used   Substance Use Topics    Alcohol use: Yes     Alcohol/week: 1.0 - 2.0 standard drink of alcohol     Types: 1 - 2 Glasses of wine per week     Comment: 2 glasses/nightly with dinner    Drug use: No       Family History   Problem Relation Age of Onset    Cancer Father 63        lung-smoker    Aneurysm Mother 73        AAA    Leukemia Maternal Grandmother     Aneurysm Maternal Grandfather         Cerebral    Scleroderma Maternal Aunt     Breast cancer Neg Hx     Ovarian cancer Neg Hx     Colon cancer Neg Hx     Deep vein thrombosis Neg Hx     Pulmonary embolism Neg Hx     Malig Hyperthermia Neg Hx     Uterine cancer Neg Hx        Review of Systems   Constitutional:  Negative for activity change, appetite change, fatigue, fever and unexpected weight change.   Respiratory:  Negative for cough and shortness of breath.    Cardiovascular:  Negative for chest pain and palpitations.   Gastrointestinal:  Negative for abdominal distention, abdominal pain, constipation, diarrhea and nausea.   Endocrine: Negative for cold intolerance and heat intolerance.   Genitourinary:  Positive for dyspareunia, genital sores and vaginal pain. Negative for dysuria, menstrual problem, pelvic pain, vaginal bleeding and vaginal discharge.   Skin:  Negative for color change and rash.   Allergic/Immunologic: Negative for environmental allergies and food allergies.   Neurological:  Negative for dizziness and headaches.   Psychiatric/Behavioral:  Positive for sleep disturbance. Negative for agitation, behavioral problems and dysphoric mood. The patient is not nervous/anxious.    All other systems reviewed and are negative.      /72   Ht 165.1 cm (65\")   Wt 78.5 kg (173 lb)   LMP  (LMP Unknown)   BMI 28.79 kg/m²     Physical Exam   Constitutional: She is oriented to person, " place, and time. She appears well-developed.   HENT:   Head: Normocephalic and atraumatic.   Eyes: Conjunctivae are normal. No scleral icterus.   Neck: No thyromegaly present.   Cardiovascular: Normal rate and regular rhythm.   Pulmonary/Chest: Effort normal and breath sounds normal. Right breast exhibits no inverted nipple, no mass, no nipple discharge, no skin change and no tenderness. Left breast exhibits no inverted nipple, no mass, no nipple discharge, no skin change and no tenderness.   Abdominal: Soft. Normal appearance and bowel sounds are normal. She exhibits no distension and no mass. There is no abdominal tenderness. There is no rebound and no guarding. No hernia.   Genitourinary:    Rectum normal.            Pelvic exam was performed with patient supine.   There is no rash, tenderness, lesion or injury on the right labia. There is lesion on the left labia. There is no rash, tenderness or injury on the left labia. Right adnexum displays no mass, no tenderness and no fullness. Left adnexum displays no mass, no tenderness and no fullness.    Vaginal tenderness present.      No vaginal discharge, erythema or bleeding.   There is tenderness in the vagina. No erythema or bleeding in the vagina.    No foreign body in the vagina.      No signs of injury in the vagina.      Genitourinary Comments: Uterus and cervix absent  Cuff smooth  + LPS     Neurological: She is alert and oriented to person, place, and time.   Skin: Skin is warm and dry.   Psychiatric: Her behavior is normal. Mood, judgment and thought content normal.   Nursing note and vitals reviewed.           Assessment/Plan    1) GYN HM: normal pap/HPV 1/2022. Check pap/HPV SBE demonstrated and encouraged.  2) STD screening: declines Condoms encouraged.  3) Bone health - Weight bearing exercise, dietary calcium recommendations and vitamin D reviewed.   4) Diet and Exercise discussed  5) Smoking Status: No   6) Hematuria- h/o B9 hematuria  7)MMG: UTD   12/2023- Birads 2. Schedule MMG 12/2024 ( [facility])  8) DEXA- UTD 10/2022- osteopenia with risk of fx 8 & 0.8 %, repeat DEXA 12/2024 ( [facility])  9)C scope- UTD 12/2016- normal, repeat 10 years  10) Vaginal atrophy - doing well on Estrace cream x 2 week. ERX sent in.   11)HRT- Patient counseled that hormone treatment should be used to help with specific symptoms related to menopause such as hot flashes, night sweats and vaginal atrophy.  Hormones should not be given for “general wellbeing” or to avoid aging. The lowest dose hormone that treats symptoms should be used for the shortest amount of time possible.  Although estrogen is the most effective treatment for hot flashes, other non hormonal options exist (such as Brisdelle or venlafaxine) and should also be considered.  Anyone with an intact uterus should also receive progestogen to prevent uterine overgrowth that can lead to uterine cancer.  All hormone therapy, whether it is synthetic or bio identical, can lead to increased risk of stroke, heart attack and thromboembolic diseases.  These adverse events are more likely to develop in the first year of use and in patients who are older than the typical menopausal age.  Risks of estrogen dependent cancers such as breast cancer increase with prolonged use.  Attempts to wean hormone therapy should be discussed annually and particularly after three to five years of use.  Any side effects such as vaginal bleeding or pain should be reported immediately.  Will decrease to Vivelle dot 0.0375 mg.   13) Vaginismus- refer pelvic floor PT  14)Sebaceous cysts of vulva- schedule removal in office  15) Parts of this document have been copied or forwarded from her previous visits and have been reviewed, updated and edited as indicated.   15)Follow up orn and 1 year annual   16)  I spent > 20  minutes on the separately reported service of HRT, vaginismus, sebaceous cysts. This time is not included in the time used to support the annual E/M  service also reported today.         Diagnoses and all orders for this visit:    1. Cervical smear, as part of routine gynecological examination (Primary)  -     IGP, Apt HPV,rfx 16 / 18,45    2. Screening for human papillomavirus  -     IGP, Apt HPV,rfx 16 / 18,45    3. Routine gynecological examination  -     POC Urinalysis Dipstick, Multipro  -     IGP, Apt HPV,rfx 16 / 18,45    4. Encounter for screening mammogram for malignant neoplasm of breast  -     Mammo Screening Digital Tomosynthesis Bilateral With CAD; Future    5. Screening for osteoporosis  -     DEXA Bone Density Axial; Future    6. Vaginismus  -     Ambulatory Referral to Physical Therapy for Evaluation & Treatment    7. Vaginal atrophy    8. Sebaceous cyst    Other orders  -     estradiol (VIVELLE-DOT) 0.0375 MG/24HR patch; Place 1 patch on the skin as directed by provider 2 (Two) Times a Week.  Dispense: 24 patch; Refill: 3  -     estradiol (ESTRACE) 0.1 MG/GM vaginal cream; Insert 1 g into the vagina 2 (Two) Times a Day.  Dispense: 42.5 g; Refill: 5        Giana Paul MD  8/28/2024  16:53 EDT

## 2024-08-30 LAB
CYTOLOGIST CVX/VAG CYTO: NORMAL
CYTOLOGY CVX/VAG DOC CYTO: NORMAL
CYTOLOGY CVX/VAG DOC THIN PREP: NORMAL
DX ICD CODE: NORMAL
HPV I/H RISK 4 DNA CVX QL PROBE+SIG AMP: NEGATIVE
Lab: NORMAL
OTHER STN SPEC: NORMAL
STAT OF ADQ CVX/VAG CYTO-IMP: NORMAL

## 2024-10-09 ENCOUNTER — OFFICE VISIT (OUTPATIENT)
Dept: OBSTETRICS AND GYNECOLOGY | Facility: CLINIC | Age: 64
End: 2024-10-09
Payer: COMMERCIAL

## 2024-10-09 VITALS
WEIGHT: 177 LBS | SYSTOLIC BLOOD PRESSURE: 122 MMHG | HEIGHT: 63 IN | DIASTOLIC BLOOD PRESSURE: 72 MMHG | BODY MASS INDEX: 31.36 KG/M2

## 2024-10-09 DIAGNOSIS — Z79.890 HORMONE REPLACEMENT THERAPY (HRT): ICD-10-CM

## 2024-10-09 DIAGNOSIS — L72.3 SEBACEOUS CYST: Primary | ICD-10-CM

## 2024-10-09 NOTE — PROGRESS NOTES
"      Rosalinda Boone is a 64 y.o. patient who presents for follow up of   Chief Complaint   Patient presents with    Follow-up    VULVAR CYST REMOVAL       64-year-old established patient here for multiple sebaceous cyst removal.  She has a string of sebaceous cyst on the left labia that are annoying to her.  She declines removal in the operating room. No issues with HRT       The following portions of the patient's history were reviewed and updated as appropriate: allergies, current medications and problem list.    Review of Systems   Genitourinary:  Positive for genital sores.       /72   Ht 160 cm (63\")   Wt 80.3 kg (177 lb)   LMP  (LMP Unknown)   BMI 31.35 kg/m²     Physical Exam  Vitals and nursing note reviewed. Exam conducted with a chaperone present.   Constitutional:       Appearance: She is well-developed.   HENT:      Head: Normocephalic and atraumatic.   Eyes:      General: No scleral icterus.     Conjunctiva/sclera: Conjunctivae normal.   Neck:      Thyroid: No thyromegaly.   Abdominal:      General: There is no distension.      Palpations: Abdomen is soft. There is no mass.      Tenderness: There is no abdominal tenderness. There is no guarding or rebound.      Hernia: No hernia is present.   Genitourinary:     Labia:         Left: Lesion present.        Skin:     General: Skin is warm and dry.   Neurological:      Mental Status: She is alert and oriented to person, place, and time.   Psychiatric:         Mood and Affect: Mood normal.         Behavior: Behavior normal.         Thought Content: Thought content normal.         Judgment: Judgment normal.         A/P:  1. HRT- pt doing fine on lower dose.   2. Sebaceous cysts of vulva- area removed in toto and closed with suture. RTO in 2 weeks for recheck of incision.     Assessment & Plan   Diagnoses and all orders for this visit:    1. Sebaceous cyst (Primary)    2. Hormone replacement therapy (HRT)                 No follow-ups on " file.      Giana Paul MD    10/9/2024  12:46 EDT

## 2024-10-14 LAB
DX ICD CODE: NORMAL
DX ICD CODE: NORMAL
PATH REPORT.FINAL DX SPEC: NORMAL
PATH REPORT.GROSS SPEC: NORMAL
PATH REPORT.SITE OF ORIGIN SPEC: NORMAL
PATHOLOGIST NAME: NORMAL
PAYMENT PROCEDURE: NORMAL

## 2024-10-23 ENCOUNTER — OFFICE VISIT (OUTPATIENT)
Dept: OBSTETRICS AND GYNECOLOGY | Facility: CLINIC | Age: 64
End: 2024-10-23
Payer: COMMERCIAL

## 2024-10-23 VITALS
SYSTOLIC BLOOD PRESSURE: 132 MMHG | DIASTOLIC BLOOD PRESSURE: 88 MMHG | BODY MASS INDEX: 29.72 KG/M2 | WEIGHT: 178.4 LBS | HEIGHT: 65 IN

## 2024-10-23 DIAGNOSIS — L72.3 SEBACEOUS CYST: Primary | ICD-10-CM

## 2024-10-23 NOTE — PROGRESS NOTES
"      Rosalinda Boone is a 64 y.o. patient who presents for follow up of   Chief Complaint   Patient presents with    Follow-up     VAGINAL CYST REMOVAL     64-year-old established patient here for 2-week check of vaginal cyst removal she was seen on 10/9/2024 and had multiple sebaceous cysts removed from the left vulva.  These have come back benign.  She has done well.  This area is still sore, however, it is improving over time.  She does have some few sutures still in place and does not want them removed as they are tender to the touch.        The following portions of the patient's history were reviewed and updated as appropriate: allergies, current medications and problem list.    Review of Systems   Genitourinary:  Positive for genital sores and vaginal pain.       /88   Ht 165.1 cm (65\")   Wt 80.9 kg (178 lb 6.4 oz)   LMP  (LMP Unknown)   BMI 29.69 kg/m²     Physical Exam  Vitals and nursing note reviewed.   Constitutional:       Appearance: Normal appearance. She is well-developed.   HENT:      Head: Normocephalic and atraumatic.   Eyes:      General: No scleral icterus.     Conjunctiva/sclera: Conjunctivae normal.   Neck:      Thyroid: No thyromegaly.   Genitourinary:     Labia:         Left: Tenderness and lesion present.        Skin:     General: Skin is warm and dry.   Neurological:      Mental Status: She is alert and oriented to person, place, and time.   Psychiatric:         Mood and Affect: Mood normal.         Behavior: Behavior normal.         Thought Content: Thought content normal.         Judgment: Judgment normal.         A/P:  1.S/P removal of L sebaceous cyst-area looks good and is healing well.  No evidence of infection.  Path benign.  Patient declines removal of sutures at this time.  2.  RHM-UTD annual 8/2024  3.  RTO 8/2025 annual and/or as needed  4. EPIC LOS calculator used to determine coding level.     Assessment & Plan   Diagnoses and all orders for this visit:    1. " Sebaceous cyst (Primary)                 No follow-ups on file.      Giana Paul MD    10/23/2024  12:52 EDT

## 2024-11-11 DIAGNOSIS — Z00.00 HEALTH CARE MAINTENANCE: Primary | ICD-10-CM

## 2024-11-12 LAB
ALBUMIN SERPL-MCNC: 4.2 G/DL (ref 3.5–5.2)
ALBUMIN/GLOB SERPL: 1.8 G/DL
ALP SERPL-CCNC: 51 U/L (ref 39–117)
ALT SERPL-CCNC: 15 U/L (ref 1–33)
AST SERPL-CCNC: 18 U/L (ref 1–32)
BASOPHILS # BLD AUTO: 0.08 10*3/MM3 (ref 0–0.2)
BASOPHILS NFR BLD AUTO: 1.1 % (ref 0–1.5)
BILIRUB SERPL-MCNC: 0.2 MG/DL (ref 0–1.2)
BUN SERPL-MCNC: 13 MG/DL (ref 8–23)
BUN/CREAT SERPL: 15.7 (ref 7–25)
CALCIUM SERPL-MCNC: 8.7 MG/DL (ref 8.6–10.5)
CHLORIDE SERPL-SCNC: 106 MMOL/L (ref 98–107)
CHOLEST SERPL-MCNC: 233 MG/DL (ref 0–200)
CO2 SERPL-SCNC: 26.7 MMOL/L (ref 22–29)
CREAT SERPL-MCNC: 0.83 MG/DL (ref 0.57–1)
EGFRCR SERPLBLD CKD-EPI 2021: 78.8 ML/MIN/1.73
EOSINOPHIL # BLD AUTO: 0.11 10*3/MM3 (ref 0–0.4)
EOSINOPHIL NFR BLD AUTO: 1.5 % (ref 0.3–6.2)
ERYTHROCYTE [DISTWIDTH] IN BLOOD BY AUTOMATED COUNT: 11.9 % (ref 12.3–15.4)
GLOBULIN SER CALC-MCNC: 2.3 GM/DL
GLUCOSE SERPL-MCNC: 95 MG/DL (ref 65–99)
HCT VFR BLD AUTO: 40 % (ref 34–46.6)
HDLC SERPL-MCNC: 87 MG/DL (ref 40–60)
HGB BLD-MCNC: 13.5 G/DL (ref 12–15.9)
IMM GRANULOCYTES # BLD AUTO: 0.02 10*3/MM3 (ref 0–0.05)
IMM GRANULOCYTES NFR BLD AUTO: 0.3 % (ref 0–0.5)
LDLC SERPL CALC-MCNC: 125 MG/DL (ref 0–100)
LDLC/HDLC SERPL: 1.4 {RATIO}
LYMPHOCYTES # BLD AUTO: 2.16 10*3/MM3 (ref 0.7–3.1)
LYMPHOCYTES NFR BLD AUTO: 30.1 % (ref 19.6–45.3)
MCH RBC QN AUTO: 31.9 PG (ref 26.6–33)
MCHC RBC AUTO-ENTMCNC: 33.8 G/DL (ref 31.5–35.7)
MCV RBC AUTO: 94.6 FL (ref 79–97)
MONOCYTES # BLD AUTO: 0.57 10*3/MM3 (ref 0.1–0.9)
MONOCYTES NFR BLD AUTO: 7.9 % (ref 5–12)
NEUTROPHILS # BLD AUTO: 4.23 10*3/MM3 (ref 1.7–7)
NEUTROPHILS NFR BLD AUTO: 59.1 % (ref 42.7–76)
NRBC BLD AUTO-RTO: 0 /100 WBC (ref 0–0.2)
PLATELET # BLD AUTO: 307 10*3/MM3 (ref 140–450)
POTASSIUM SERPL-SCNC: 4.4 MMOL/L (ref 3.5–5.2)
PROT SERPL-MCNC: 6.5 G/DL (ref 6–8.5)
RBC # BLD AUTO: 4.23 10*6/MM3 (ref 3.77–5.28)
SODIUM SERPL-SCNC: 141 MMOL/L (ref 136–145)
TRIGL SERPL-MCNC: 121 MG/DL (ref 0–150)
TSH SERPL DL<=0.005 MIU/L-ACNC: 2.1 UIU/ML (ref 0.27–4.2)
VLDLC SERPL CALC-MCNC: 21 MG/DL (ref 5–40)
WBC # BLD AUTO: 7.17 10*3/MM3 (ref 3.4–10.8)

## 2024-11-19 ENCOUNTER — OFFICE VISIT (OUTPATIENT)
Dept: INTERNAL MEDICINE | Facility: CLINIC | Age: 64
End: 2024-11-19
Payer: COMMERCIAL

## 2024-11-19 VITALS
OXYGEN SATURATION: 96 % | SYSTOLIC BLOOD PRESSURE: 118 MMHG | WEIGHT: 178.2 LBS | HEART RATE: 72 BPM | BODY MASS INDEX: 29.69 KG/M2 | HEIGHT: 65 IN | DIASTOLIC BLOOD PRESSURE: 80 MMHG | TEMPERATURE: 98.2 F

## 2024-11-19 DIAGNOSIS — Z00.00 HEALTH CARE MAINTENANCE: Primary | ICD-10-CM

## 2024-11-19 DIAGNOSIS — F41.9 ANXIETY: ICD-10-CM

## 2024-11-19 DIAGNOSIS — I49.1 PREMATURE ATRIAL CONTRACTIONS: ICD-10-CM

## 2024-11-19 PROCEDURE — 99396 PREV VISIT EST AGE 40-64: CPT | Performed by: INTERNAL MEDICINE

## 2024-11-19 NOTE — PROGRESS NOTES
Subjective   Rosalinda Almaraz is a 64 y.o. female and is here for a comprehensive physical exam. The patient reports problems - dizziness .  SHe does have int episodes of dizziness.  Usually brought on by turning head or standing up  feels light headed  not vertigo she does have svt and does not think her HR is fast   She has recently started to wean off the hrt. The svt has been worse since the hot flashes have come roslyn  Pt has been compliant with meds for GERD.  No sx as long as pt takes medicine as prescribed.  No epigastric pain or reflux sx  She is struggling with weight loss      Pt is UTD with annual gyn exam and mammo     Do you take any herbs or supplements that were not prescribed by a doctor?       Social History: no tob no etoh    Social History     Socioeconomic History    Marital status:      Spouse name: MATTIE ALMARAZ    Number of children: 2   Tobacco Use    Smoking status: Never    Smokeless tobacco: Never   Vaping Use    Vaping status: Never Used   Substance and Sexual Activity    Alcohol use: Yes     Alcohol/week: 1.0 - 2.0 standard drink of alcohol     Types: 1 - 2 Glasses of wine per week     Comment: 2 glasses/nightly with dinner    Drug use: No    Sexual activity: Yes     Partners: Male     Birth control/protection: Post-menopausal, Hysterectomy     Comment: I am on hormone therapy.       Family History:   Family History   Problem Relation Age of Onset    Cancer Father 63        lung-smoker    Arthritis Father     Aneurysm Mother 73        AAA    Arthritis Mother     Leukemia Maternal Grandmother     Vision loss Maternal Grandmother     Aneurysm Maternal Grandfather         Cerebral    Scleroderma Maternal Aunt     Stroke Maternal Aunt     Breast cancer Neg Hx     Ovarian cancer Neg Hx     Colon cancer Neg Hx     Deep vein thrombosis Neg Hx     Pulmonary embolism Neg Hx     Malig Hyperthermia Neg Hx     Uterine cancer Neg Hx        Past Medical History:   Past Medical History:   Diagnosis  "Date    Arthritis     HANDS, BACK , FEET, NECK    Asthma     Bladder spasm     Gastritis     GERD (gastroesophageal reflux disease)     Hematuria- has had neg urology workup     History of medical problems moderate sleep apnea    IBS (irritable bowel syndrome)     Lactose intolerance a couple of years ago    Low back pain     Migraine     Osteopenia     Palpitations     lov cardiology 7/2020    Panic attacks     Panic attacks     PONV (postoperative nausea and vomiting)     Premature atrial contractions     Right sided abdominal pain     S/P D&C (status post dilation and curettage) 03/03/2020    Scoliosis     Sleep apnea 09/16/2022    SOB (shortness of breath)     Spotting     SCHEDULED FOR D&C    Status post laparoscopic hysterectomy 11/17/2020    SVT (supraventricular tachycardia)     14 day Zio Patch starting on 12/22/17: 33 brief runs of SVT (longest 20 beats in length at 153 bpm).     Tingling     in hands     Urinary tract infection            Review of Systems    Pertinent items are noted in HPI.    Objective   /80 (BP Location: Left arm, Patient Position: Sitting)   Pulse 72   Temp 98.2 °F (36.8 °C) (Oral)   Ht 165.1 cm (65\")   Wt 80.8 kg (178 lb 3.2 oz)   LMP  (LMP Unknown)   SpO2 96%   BMI 29.65 kg/m²     General Appearance:    Alert, cooperative, no distress, appears stated age   Head:    Normocephalic, without obvious abnormality, atraumatic   Eyes:    PERRL, conjunctiva/corneas clear, EOM's intact, fundi     benign, both eyes   Ears:    Normal TM's and external ear canals, both ears   Nose:   Nares normal, septum midline, mucosa normal, no drainage    or sinus tenderness   Throat:   Lips, mucosa, and tongue normal; teeth and gums normal   Neck:   Supple, symmetrical, trachea midline, no adenopathy;     thyroid:  no enlargement/tenderness/nodules; no carotid    bruit or JVD   Back:     Symmetric, no curvature, ROM normal, no CVA tenderness   Lungs:     Clear to auscultation bilaterally, " respirations unlabored   Chest Wall:    No tenderness or deformity    Heart:    Regular rate and rhythm, S1 and S2 normal, no murmur, rub   or gallop       Abdomen:     Soft, non-tender, bowel sounds active all four quadrants,     no masses, no organomegaly           Extremities:   Extremities normal, atraumatic, no cyanosis or edema   Pulses:   2+ and symmetric all extremities   Skin:   Skin color, texture, turgor normal, no rashes or lesions   Lymph nodes:   Cervical, supraclavicular, and axillary nodes normal   Neurologic:   CNII-XII intact, normal strength, sensation and reflexes     throughout       Vitals:    11/19/24 1442   BP: 118/80   Pulse: 72   Temp: 98.2 °F (36.8 °C)   SpO2: 96%     Body mass index is 29.65 kg/m².      Medications:   Current Outpatient Medications:     ASMANEX, 30 METERED DOSES, 110 MCG/INH inhaler, Inhale 1-2 puffs Daily. Can use twice a day if needed, Disp: , Rfl:     Azelastine HCl 137 MCG/SPRAY solution, , Disp: , Rfl:     Biotin 95324 MCG tablet, Take 1 tablet by mouth Daily., Disp: , Rfl:     calcium carb-cholecalciferol 600-800 MG-UNIT tablet, Take 1 tablet by mouth Daily., Disp: , Rfl:     Estelita Oil-Levomenthol (FDgard) 25-20.75 MG capsule, Take 2 capsules by mouth 3 (Three) Times a Day As Needed (stomach issues)., Disp: , Rfl:     desloratadine (CLARINEX) 5 MG tablet, Take 1 tablet by mouth Every Morning., Disp: , Rfl:     dilTIAZem (CARDIZEM) 60 MG tablet, TAKE 2 TABLETS DAILY AND 1 TABLET EVERY EVENING, Disp: 270 tablet, Rfl: 3    estradiol (ESTRACE) 0.1 MG/GM vaginal cream, Insert 1 g into the vagina 2 (Two) Times a Day., Disp: 42.5 g, Rfl: 5    estradiol (VIVELLE-DOT) 0.0375 MG/24HR patch, Place 1 patch on the skin as directed by provider 2 (Two) Times a Week., Disp: 24 patch, Rfl: 3    FLUTICASONE PROPIONATE, NASAL, NA, 1 spray into the nostril(s) as directed by provider Daily As Needed., Disp: , Rfl:     metroNIDAZOLE (METROCREAM) 0.75 % cream, Apply ONE application ON  THE SKIN daily, Disp: , Rfl:     montelukast (SINGULAIR) 10 MG tablet, Take 1 tablet by mouth Every Night., Disp: , Rfl:     Multiple Vitamin (MULTI VITAMIN DAILY PO), Take 2 tablets by mouth Daily., Disp: , Rfl:     pantoprazole (PROTONIX) 40 MG EC tablet, Take 1 tablet by mouth Daily., Disp: 90 tablet, Rfl: 3    Peppermint Oil (IBgard) 90 MG capsule controlled-release, Take 2 capsules by mouth 3 (Three) Times a Day As Needed (stomach issues). IBgard, Disp: , Rfl:     PROAIR  (90 BASE) MCG/ACT inhaler, Inhale 2 puffs Every 4 (Four) Hours As Needed., Disp: , Rfl:     Probiotic Product (Align) capsule, Take 1 capsule by mouth Daily., Disp: , Rfl:     Unable to find, 1 each 1 (One) Time. Relief Factor Take one packet per day., Disp: , Rfl:     Wheat Dextrin (BENEFIBER) powder, Take 1 each by mouth Daily., Disp: , Rfl:     XIIDRA 5 % solution, Administer 1 drop to both eyes 2 (Two) Times a Day., Disp: , Rfl:              Assessment & Plan   Healthy female exam.      1. Healthcare Maintenance:  2. Patient Counseling:  --Nutrition: Stressed importance of moderation in sodium/caffeine intake, saturated fat and cholesterol, caloric balance, sufficient intake of fresh fruits, vegetables, fiber, calcium and vit D  --Exercise: she does not exercise reg  --Substance Abuse: no tob no etoh  --Dental health: she   --Immunizations reviewed.  --Discussed benefits of screening colonoscopy.  3.  GERD-  ok with current meds  4.  SVT- worse since weaning the hrt  5. Climacteric sx-  she is trying to wean the hrt but she will call the gyn given the increased sx   6.  Weight gain-  she is going to work on increasing protein intake try my fitness pal or lose it

## 2024-11-25 ENCOUNTER — OFFICE VISIT (OUTPATIENT)
Dept: INTERNAL MEDICINE | Facility: CLINIC | Age: 64
End: 2024-11-25
Payer: COMMERCIAL

## 2024-11-25 VITALS
SYSTOLIC BLOOD PRESSURE: 110 MMHG | OXYGEN SATURATION: 98 % | BODY MASS INDEX: 29.82 KG/M2 | HEIGHT: 65 IN | WEIGHT: 179 LBS | HEART RATE: 75 BPM | DIASTOLIC BLOOD PRESSURE: 70 MMHG

## 2024-11-25 DIAGNOSIS — N32.89 BLADDER SPASMS: Primary | ICD-10-CM

## 2024-11-25 DIAGNOSIS — R30.0 DYSURIA: ICD-10-CM

## 2024-11-25 LAB
BILIRUB BLD-MCNC: NEGATIVE MG/DL
CLARITY, POC: CLEAR
COLOR UR: YELLOW
EXPIRATION DATE: ABNORMAL
GLUCOSE UR STRIP-MCNC: NEGATIVE MG/DL
KETONES UR QL: NEGATIVE
LEUKOCYTE EST, POC: ABNORMAL
Lab: ABNORMAL
NITRITE UR-MCNC: NEGATIVE MG/ML
PH UR: 6 [PH] (ref 5–8)
PROT UR STRIP-MCNC: NEGATIVE MG/DL
RBC # UR STRIP: ABNORMAL /UL
SP GR UR: 1.02 (ref 1–1.03)
UROBILINOGEN UR QL: ABNORMAL

## 2024-11-25 PROCEDURE — 99214 OFFICE O/P EST MOD 30 MIN: CPT | Performed by: NURSE PRACTITIONER

## 2024-11-25 PROCEDURE — 81003 URINALYSIS AUTO W/O SCOPE: CPT | Performed by: NURSE PRACTITIONER

## 2024-11-25 RX ORDER — NITROFURANTOIN 25; 75 MG/1; MG/1
100 CAPSULE ORAL 2 TIMES DAILY
Qty: 10 CAPSULE | Refills: 0 | Status: SHIPPED | OUTPATIENT
Start: 2024-11-25

## 2024-11-25 RX ORDER — METHENAMINE, SODIUM PHOSPHATE, MONOBASIC, MONOHYDRATE, PHENYL SALICYLATE, METHYLENE BLUE, AND HYOSCYAMINE SULFATE 118; 40.8; 36; 10; .12 MG/1; MG/1; MG/1; MG/1; MG/1
1 CAPSULE ORAL 3 TIMES DAILY PRN
Qty: 30 CAPSULE | Refills: 0 | Status: SHIPPED | OUTPATIENT
Start: 2024-11-25

## 2024-11-25 NOTE — PROGRESS NOTES
Subjective   Rosalinda Boone is a 64 y.o. female.     History of Present Illness    The patient is here today with c/o hx of bladder spasms. Has seen Dr. Quezada in the past, has used up her uribel. She notes since Friday she has had dysuria. She had to stop her hormone cream due to bladder cyst removal 1 month ago. She feels her issue is primarily since being off topical estrogen. She does not an odor.   It is her baseline to have trace blood on UA.     The following portions of the patient's history were reviewed and updated as appropriate: allergies, current medications, past family history, past medical history, past social history, past surgical history and problem list.    Review of Systems   Constitutional: Negative.    Respiratory: Negative.     Cardiovascular: Negative.    Genitourinary:  Positive for dysuria and urgency (a little bit, now gone). Negative for flank pain, hematuria, pelvic pain and pelvic pressure.       Objective   Physical Exam  Constitutional:       Appearance: Normal appearance. She is well-developed.   Neck:      Thyroid: No thyromegaly.   Cardiovascular:      Rate and Rhythm: Normal rate and regular rhythm.      Heart sounds: Normal heart sounds.   Pulmonary:      Effort: Pulmonary effort is normal.      Breath sounds: Normal breath sounds.   Abdominal:      Tenderness: There is no right CVA tenderness or left CVA tenderness.   Musculoskeletal:      Cervical back: Normal range of motion and neck supple.   Lymphadenopathy:      Cervical: No cervical adenopathy.   Skin:     General: Skin is warm and dry.   Neurological:      Mental Status: She is alert.   Psychiatric:         Behavior: Behavior normal.         Thought Content: Thought content normal.         Judgment: Judgment normal.         Vitals:    11/25/24 1351   BP: 110/70   Pulse: 75   SpO2: 98%     Body mass index is 29.79 kg/m².    Current Outpatient Medications:     ASMANEX, 30 METERED DOSES, 110 MCG/INH inhaler, Inhale 1-2  puffs Daily. Can use twice a day if needed, Disp: , Rfl:     Azelastine HCl 137 MCG/SPRAY solution, , Disp: , Rfl:     Biotin 99017 MCG tablet, Take 1 tablet by mouth Daily., Disp: , Rfl:     calcium carb-cholecalciferol 600-800 MG-UNIT tablet, Take 1 tablet by mouth Daily., Disp: , Rfl:     Estelita Oil-Levomenthol (FDgard) 25-20.75 MG capsule, Take 2 capsules by mouth 3 (Three) Times a Day As Needed (stomach issues)., Disp: , Rfl:     desloratadine (CLARINEX) 5 MG tablet, Take 1 tablet by mouth Every Morning., Disp: , Rfl:     dilTIAZem (CARDIZEM) 60 MG tablet, TAKE 2 TABLETS DAILY AND 1 TABLET EVERY EVENING, Disp: 270 tablet, Rfl: 3    estradiol (ESTRACE) 0.1 MG/GM vaginal cream, Insert 1 g into the vagina 2 (Two) Times a Day., Disp: 42.5 g, Rfl: 5    estradiol (VIVELLE-DOT) 0.0375 MG/24HR patch, Place 1 patch on the skin as directed by provider 2 (Two) Times a Week., Disp: 24 patch, Rfl: 3    FLUTICASONE PROPIONATE, NASAL, NA, 1 spray into the nostril(s) as directed by provider Daily As Needed., Disp: , Rfl:     metroNIDAZOLE (METROCREAM) 0.75 % cream, Apply ONE application ON THE SKIN daily, Disp: , Rfl:     montelukast (SINGULAIR) 10 MG tablet, Take 1 tablet by mouth Every Night., Disp: , Rfl:     Multiple Vitamin (MULTI VITAMIN DAILY PO), Take 2 tablets by mouth Daily., Disp: , Rfl:     pantoprazole (PROTONIX) 40 MG EC tablet, Take 1 tablet by mouth Daily., Disp: 90 tablet, Rfl: 3    Peppermint Oil (IBgard) 90 MG capsule controlled-release, Take 2 capsules by mouth 3 (Three) Times a Day As Needed (stomach issues). IBgard, Disp: , Rfl:     PROAIR  (90 BASE) MCG/ACT inhaler, Inhale 2 puffs Every 4 (Four) Hours As Needed., Disp: , Rfl:     Probiotic Product (Align) capsule, Take 1 capsule by mouth Daily., Disp: , Rfl:     Unable to find, 1 each 1 (One) Time. Relief Factor Take one packet per day., Disp: , Rfl:     Wheat Dextrin (BENEFIBER) powder, Take 1 each by mouth Daily., Disp: , Rfl:     XIIDRA 5 %  solution, Administer 1 drop to both eyes 2 (Two) Times a Day., Disp: , Rfl:     nitrofurantoin, macrocrystal-monohydrate, (Macrobid) 100 MG capsule, Take 1 capsule by mouth 2 (Two) Times a Day., Disp: 10 capsule, Rfl: 0    uribel (URO-MP) 118 MG capsule capsule, Take 1 capsule by mouth 3 (Three) Times a Day As Needed (pain)., Disp: 30 capsule, Rfl: 0     Assessment & Plan   Diagnoses and all orders for this visit:    1. Bladder spasms (Primary)  -     POCT urinalysis dipstick, automated  -     Urine Culture - Urine, Urine, Clean Catch  -     nitrofurantoin, macrocrystal-monohydrate, (Macrobid) 100 MG capsule; Take 1 capsule by mouth 2 (Two) Times a Day.  Dispense: 10 capsule; Refill: 0  -     uribel (URO-MP) 118 MG capsule capsule; Take 1 capsule by mouth 3 (Three) Times a Day As Needed (pain).  Dispense: 30 capsule; Refill: 0    2. Dysuria  -     nitrofurantoin, macrocrystal-monohydrate, (Macrobid) 100 MG capsule; Take 1 capsule by mouth 2 (Two) Times a Day.  Dispense: 10 capsule; Refill: 0  -     uribel (URO-MP) 118 MG capsule capsule; Take 1 capsule by mouth 3 (Three) Times a Day As Needed (pain).  Dispense: 30 capsule; Refill: 0                 1. Bladder spasms- will send in uribel to be used PRN, she has restarted her topical hormones. She will f/u with urology in a few weeks.   2. Urinary leuks- will run culture and send in antibiotic for worsening symptoms over the holiday weekend.

## 2024-11-28 LAB
BACTERIA UR CULT: ABNORMAL
BACTERIA UR CULT: ABNORMAL
OTHER ANTIBIOTIC SUSC ISLT: ABNORMAL

## 2024-12-03 ENCOUNTER — TELEPHONE (OUTPATIENT)
Dept: INTERNAL MEDICINE | Facility: CLINIC | Age: 64
End: 2024-12-03
Payer: COMMERCIAL

## 2024-12-03 NOTE — TELEPHONE ENCOUNTER
Having reaction augmentin  feeling woozy headed and not feeling well  no rash no temp no nausea not sure what to do  she feels ok on macrobid

## 2024-12-10 ENCOUNTER — HOSPITAL ENCOUNTER (OUTPATIENT)
Dept: BONE DENSITY | Facility: HOSPITAL | Age: 64
Discharge: HOME | End: 2024-12-10
Payer: COMMERCIAL

## 2024-12-10 ENCOUNTER — HOSPITAL ENCOUNTER (OUTPATIENT)
Dept: MAMMOGRAPHY | Facility: HOSPITAL | Age: 64
Discharge: HOME | End: 2024-12-10
Payer: COMMERCIAL

## 2024-12-10 DIAGNOSIS — Z12.31 ENCOUNTER FOR SCREENING MAMMOGRAM FOR MALIGNANT NEOPLASM OF BREAST: ICD-10-CM

## 2024-12-10 DIAGNOSIS — Z13.820 SCREENING FOR OSTEOPOROSIS: ICD-10-CM

## 2024-12-10 PROCEDURE — 77080 DXA BONE DENSITY AXIAL: CPT

## 2024-12-10 PROCEDURE — 77063 BREAST TOMOSYNTHESIS BI: CPT

## 2024-12-10 PROCEDURE — 77067 SCR MAMMO BI INCL CAD: CPT

## 2024-12-12 DIAGNOSIS — R92.8 ABNORMAL MAMMOGRAM: Primary | ICD-10-CM

## 2024-12-12 NOTE — PROGRESS NOTES
PIP= DEXA shows improved osteopenia, however, she still does not meet the criteria for treatment with osteoporosis medication. Rec daily calcium and vit D intake along with weight bearing exercises.There are recommendations for calcium intake of 1200 mg daily for postmenopausal women.  This calcium should preferably come from your diet because it is better absorbed. Vitamin D helps the body absorb calcium. What is not obtained from diet should be supplemented in small doses during the day with D at meals. Also weight-bearing exercise helps with bone density. Walking is a great example. All this can help reduce your risk of fracture if you have an accident or fall.  Repeat DEXA in 2-3 years.

## 2024-12-12 NOTE — PROGRESS NOTES
PIP= patient has an area of concern in the left breast.  Please arrange diagnostic mammogram with Stanislaw of left breast and targeted left breast ultrasound.

## 2024-12-16 ENCOUNTER — TELEPHONE (OUTPATIENT)
Dept: INTERNAL MEDICINE | Facility: CLINIC | Age: 64
End: 2024-12-16

## 2024-12-16 DIAGNOSIS — R30.0 DYSURIA: Primary | ICD-10-CM

## 2024-12-16 NOTE — TELEPHONE ENCOUNTER
Caller: Rosalinda Boone    Relationship: Self    Best call back number: [phone]     What orders are you requesting (i.e. lab or imaging): LABS        Where will you receive your lab/imaging services: IN OFFICE    Additional notes: PATIENT IS SUPPOSED TO COME IN FOR A REPEAT URINALYSIS. PLEASE CALL TO SCHEDULE ONCE ORDERS ARE POSTED

## 2024-12-17 ENCOUNTER — CLINICAL SUPPORT (OUTPATIENT)
Dept: INTERNAL MEDICINE | Facility: CLINIC | Age: 64
End: 2024-12-17
Payer: COMMERCIAL

## 2024-12-17 DIAGNOSIS — N32.89 BLADDER SPASMS: ICD-10-CM

## 2024-12-17 DIAGNOSIS — R30.0 DYSURIA: Primary | ICD-10-CM

## 2024-12-17 LAB
BILIRUB BLD-MCNC: NEGATIVE MG/DL
CLARITY, POC: CLEAR
COLOR UR: YELLOW
EXPIRATION DATE: ABNORMAL
GLUCOSE UR STRIP-MCNC: NEGATIVE MG/DL
KETONES UR QL: NEGATIVE
LEUKOCYTE EST, POC: ABNORMAL
Lab: ABNORMAL
NITRITE UR-MCNC: NEGATIVE MG/ML
PH UR: 6 [PH] (ref 5–8)
PROT UR STRIP-MCNC: NEGATIVE MG/DL
RBC # UR STRIP: ABNORMAL /UL
SP GR UR: 1.01 (ref 1–1.03)
UROBILINOGEN UR QL: ABNORMAL

## 2024-12-17 PROCEDURE — 81003 URINALYSIS AUTO W/O SCOPE: CPT | Performed by: NURSE PRACTITIONER

## 2024-12-19 LAB
BACTERIA UR CULT: NORMAL
BACTERIA UR CULT: NORMAL

## 2025-01-09 ENCOUNTER — HOSPITAL ENCOUNTER (OUTPATIENT)
Dept: ULTRASOUND IMAGING | Facility: HOSPITAL | Age: 65
Discharge: HOME | End: 2025-01-09
Payer: COMMERCIAL

## 2025-01-09 ENCOUNTER — HOSPITAL ENCOUNTER (OUTPATIENT)
Dept: MAMMOGRAPHY | Facility: HOSPITAL | Age: 65
Discharge: HOME | End: 2025-01-09
Payer: COMMERCIAL

## 2025-01-09 DIAGNOSIS — R92.8 ABNORMAL MAMMOGRAM: ICD-10-CM

## 2025-01-09 PROCEDURE — 77065 DX MAMMO INCL CAD UNI: CPT

## 2025-01-09 PROCEDURE — G0279 TOMOSYNTHESIS, MAMMO: HCPCS

## 2025-01-09 PROCEDURE — 76642 ULTRASOUND BREAST LIMITED: CPT

## 2025-01-16 DIAGNOSIS — R92.8 ABNORMAL MAMMOGRAM: Primary | ICD-10-CM

## 2025-02-13 DIAGNOSIS — K21.9 GASTROESOPHAGEAL REFLUX DISEASE, UNSPECIFIED WHETHER ESOPHAGITIS PRESENT: ICD-10-CM

## 2025-02-14 RX ORDER — PANTOPRAZOLE SODIUM 40 MG/1
40 TABLET, DELAYED RELEASE ORAL DAILY
Qty: 30 TABLET | Refills: 0 | Status: SHIPPED | OUTPATIENT
Start: 2025-02-14

## 2025-02-26 ENCOUNTER — OFFICE VISIT (OUTPATIENT)
Dept: GASTROENTEROLOGY | Facility: CLINIC | Age: 65
End: 2025-02-26
Payer: COMMERCIAL

## 2025-02-26 VITALS
TEMPERATURE: 98 F | HEART RATE: 84 BPM | DIASTOLIC BLOOD PRESSURE: 81 MMHG | BODY MASS INDEX: 29.81 KG/M2 | WEIGHT: 178.9 LBS | HEIGHT: 65 IN | SYSTOLIC BLOOD PRESSURE: 119 MMHG

## 2025-02-26 DIAGNOSIS — K21.9 GASTROESOPHAGEAL REFLUX DISEASE, UNSPECIFIED WHETHER ESOPHAGITIS PRESENT: Primary | ICD-10-CM

## 2025-02-26 DIAGNOSIS — K58.2 IRRITABLE BOWEL SYNDROME WITH BOTH CONSTIPATION AND DIARRHEA: ICD-10-CM

## 2025-02-26 PROCEDURE — 99214 OFFICE O/P EST MOD 30 MIN: CPT

## 2025-02-26 NOTE — PROGRESS NOTES
"Chief Complaint  Gastroesophageal reflux disease, unspecified whether esopha    Subjective          History of Present Illness    Rosalinda Boone is a  64 y.o. female presents for follow-up for GERD and IBS.  She is a patient of Dr. Real and was last seen by me 2/15/2024.    At last visit reflux was well-controlled on Protonix.  She has failed Prevacid in the past.  IBS was well-controlled with align probiotic and fiber supplementation.    She reports that she is doing well on Protonix nightly.  She does still get breakthrough symptoms occasionally at night.  FDgard helps her keep this well-controlled.  She has no difficulty swallowing and does not feel like food gets stuck in her throat.  No unintentional weight loss.  She does have history of diarrhea but she says this is pretty well-controlled as long as she takes her align.  No black or bloody stool.  She has been doing pelvic floor therapy twice weekly as recommended by her OB/GYN and feels like this may have helped some with her bowel movements as well.  She avoids dairy and greasy foods.    CMP 11/12/2024 shows normal kidney function and normal LFTs.  CBC shows normal hemoglobin.    CT abdomen pelvis in 2017 was unremarkable other than tortuous colon.     Colonoscopy 12/5/2016 showed one 3 mm polyp in descending colon, nonbleeding internal hemorrhoids and otherwise normal exam. Repeat 10 years.      EGD 12/5/2016 showed normal esophagus, regular Z-line, granular gastric mucosa, normal examined duodenum.     No family history of colon cancer.     Objective   Vital Signs:   /81 (BP Location: Left arm, Patient Position: Sitting, Cuff Size: Adult)   Pulse 84   Temp 98 °F (36.7 °C) (Temporal)   Ht 165.1 cm (65\")   Wt 81.1 kg (178 lb 14.4 oz)   BMI 29.77 kg/m²       Physical Exam  Constitutional:       General: She is not in acute distress.     Appearance: Normal appearance.   Eyes:      General: No scleral icterus.  Pulmonary:      Effort: Pulmonary " effort is normal.   Abdominal:      General: Abdomen is flat. There is no distension.      Tenderness: There is no abdominal tenderness. There is no guarding.   Skin:     Coloration: Skin is not jaundiced.   Neurological:      General: No focal deficit present.      Mental Status: She is alert and oriented to person, place, and time.   Psychiatric:         Mood and Affect: Mood normal.         Behavior: Behavior normal.          Result Review :   The following data was reviewed by: Stephy Tan PA-C on 02/26/2025:  CMP          11/12/2024    09:06   CMP   Glucose 95    BUN 13    Creatinine 0.83    EGFR 78.8    Sodium 141    Potassium 4.4    Chloride 106    Calcium 8.7    Total Protein 6.5    Albumin 4.2    Globulin 2.3    Total Bilirubin 0.2    Alkaline Phosphatase 51    AST (SGOT) 18    ALT (SGPT) 15    Albumin/Globulin Ratio 1.8    BUN/Creatinine Ratio 15.7      CBC          11/12/2024    09:06   CBC   WBC 7.17    RBC 4.23    Hemoglobin 13.5    Hematocrit 40.0    MCV 94.6    MCH 31.9    MCHC 33.8    RDW 11.9    Platelets 307              Assessment:   Diagnoses and all orders for this visit:    1. Gastroesophageal reflux disease, unspecified whether esophagitis present (Primary)    2. Irritable bowel syndrome with both constipation and diarrhea          Plan:   -Recommend increasing pantoprazole to twice daily for more complete coverage against reflux.  We did discuss EGD if symptoms do not improve.  She will reach out in 2 weeks with update  -Okay to continue align probiotic.  -Encouraged her to continue with pelvic floor therapy  -Due for colonoscopy next year.        Follow Up   No follow-ups on file.    Dragon dictation used throughout this note.         Stephy Tan PA-C  [facility]  [address] [place] [address] - [address] [place] [address]  Office: [phone]

## 2025-03-10 DIAGNOSIS — K21.9 GASTROESOPHAGEAL REFLUX DISEASE, UNSPECIFIED WHETHER ESOPHAGITIS PRESENT: ICD-10-CM

## 2025-03-10 RX ORDER — PANTOPRAZOLE SODIUM 40 MG/1
40 TABLET, DELAYED RELEASE ORAL DAILY
Qty: 10 TABLET | Refills: 0 | Status: SHIPPED | OUTPATIENT
Start: 2025-03-10 | End: 2025-03-10 | Stop reason: SDUPTHER

## 2025-03-10 RX ORDER — PANTOPRAZOLE SODIUM 40 MG/1
40 TABLET, DELAYED RELEASE ORAL DAILY
Qty: 90 TABLET | Refills: 1 | Status: SHIPPED | OUTPATIENT
Start: 2025-03-10

## 2025-03-19 ENCOUNTER — TELEPHONE (OUTPATIENT)
Dept: GASTROENTEROLOGY | Facility: CLINIC | Age: 65
End: 2025-03-19
Payer: MEDICARE

## 2025-03-27 DIAGNOSIS — R00.2 PALPITATIONS: Primary | ICD-10-CM

## 2025-04-16 ENCOUNTER — TELEPHONE (OUTPATIENT)
Dept: GASTROENTEROLOGY | Facility: CLINIC | Age: 65
End: 2025-04-16
Payer: MEDICARE

## 2025-04-16 DIAGNOSIS — K21.9 GASTROESOPHAGEAL REFLUX DISEASE, UNSPECIFIED WHETHER ESOPHAGITIS PRESENT: ICD-10-CM

## 2025-04-16 RX ORDER — PANTOPRAZOLE SODIUM 40 MG/1
40 TABLET, DELAYED RELEASE ORAL
Qty: 180 TABLET | Refills: 1 | Status: SHIPPED | OUTPATIENT
Start: 2025-04-16

## 2025-05-06 ENCOUNTER — TELEPHONE (OUTPATIENT)
Dept: OBSTETRICS AND GYNECOLOGY | Facility: CLINIC | Age: 65
End: 2025-05-06

## 2025-05-06 NOTE — TELEPHONE ENCOUNTER
Caller: ENEDINA    Relationship to patient: Provider    Best call back number: [phone]    Patient is needing: ENEDINA WITH [facility] IS FAXING OVER DATE OF SERVICE 04/22/25 PAPERWORK. PLEASE CALL ENEDINA BACK ONCE THIS HAS BEEN RECEIVED. THIS IS THE 3RD TIME THEY HAVE FAXED OVER AND THEY ARE NEEDING IT BACK SO IT CAN BE PAID

## 2025-05-29 ENCOUNTER — OFFICE VISIT (OUTPATIENT)
Dept: GASTROENTEROLOGY | Facility: CLINIC | Age: 65
End: 2025-05-29
Payer: MEDICARE

## 2025-05-29 VITALS
BODY MASS INDEX: 30.19 KG/M2 | DIASTOLIC BLOOD PRESSURE: 76 MMHG | HEIGHT: 65 IN | HEART RATE: 74 BPM | TEMPERATURE: 96.2 F | SYSTOLIC BLOOD PRESSURE: 116 MMHG | WEIGHT: 181.2 LBS

## 2025-05-29 DIAGNOSIS — K58.2 IRRITABLE BOWEL SYNDROME WITH BOTH CONSTIPATION AND DIARRHEA: ICD-10-CM

## 2025-05-29 DIAGNOSIS — K21.9 GASTROESOPHAGEAL REFLUX DISEASE, UNSPECIFIED WHETHER ESOPHAGITIS PRESENT: Primary | ICD-10-CM

## 2025-05-29 DIAGNOSIS — K21.9 GASTROESOPHAGEAL REFLUX DISEASE WITHOUT ESOPHAGITIS: ICD-10-CM

## 2025-05-29 PROCEDURE — 99214 OFFICE O/P EST MOD 30 MIN: CPT

## 2025-05-29 PROCEDURE — 1160F RVW MEDS BY RX/DR IN RCRD: CPT

## 2025-05-29 PROCEDURE — 1159F MED LIST DOCD IN RCRD: CPT

## 2025-05-29 RX ORDER — CYCLOSPORINE OPHTHALMIC SOLUTION 1 MG/ML
SOLUTION/ DROPS OPHTHALMIC
COMMUNITY
Start: 2025-05-27

## 2025-05-29 NOTE — PROGRESS NOTES
"Chief Complaint  Heartburn    Subjective          History of Present Illness    Rosalinda Boone is a  65 y.o. female presents for follow-up for GERD and IBS.  She is a patient Dr. Real and was last seen by me February 2025.    At last visit she was doing well on Protonix nightly.  She has failed Prevacid in the past.  IBS has been controlled in the past with align probiotic and fiber supplementation.    She has been doing really well since her last office visit. GERD has resolved since increasing PPI to twice daily. No difficulty swallowing. She has been on align for about 1 year which has helped control IBS. She started feeling more constipated which would alternate w/ diarrhea and urgency so she backed off of align to once every other day and started having diarrhea. Now on align every day again. No other new supplements or medications.  She states she feels like her bowel movements are returning to normal.  She is not sure if she she had a GI bug that caused the loose stool.  She is unsure what may have caused her to have constipation in the first place initially.    CMP 11/12/2024 shows normal kidney function and normal LFTs.  CBC shows normal hemoglobin.     CT abdomen pelvis in 2017 was unremarkable other than tortuous colon.     Colonoscopy 12/5/2016 showed one 3 mm polyp in descending colon, nonbleeding internal hemorrhoids and otherwise normal exam. Repeat 10 years.      EGD 12/5/2016 showed normal esophagus, regular Z-line, granular gastric mucosa, normal examined duodenum.     No family history of colon cancer.        Objective   Vital Signs:   /76   Pulse 74   Temp 96.2 °F (35.7 °C) (Temporal)   Ht 165.1 cm (65\")   Wt 82.2 kg (181 lb 3.2 oz)   BMI 30.15 kg/m²       Physical Exam  Constitutional:       General: She is not in acute distress.     Appearance: Normal appearance.   Eyes:      General: No scleral icterus.  Pulmonary:      Effort: Pulmonary effort is normal.   Abdominal:      General: " Abdomen is flat. There is no distension.      Tenderness: There is no abdominal tenderness. There is no guarding.   Skin:     Coloration: Skin is not jaundiced.   Neurological:      General: No focal deficit present.      Mental Status: She is alert and oriented to person, place, and time.   Psychiatric:         Mood and Affect: Mood normal.         Behavior: Behavior normal.          Result Review :   The following data was reviewed by: Stephy Tan PA-C on 05/29/2025:  CMP          11/12/2024    09:06   CMP   Glucose 95    BUN 13    Creatinine 0.83    EGFR 78.8    Sodium 141    Potassium 4.4    Chloride 106    Calcium 8.7    Total Protein 6.5    Albumin 4.2    Globulin 2.3    Total Bilirubin 0.2    Alkaline Phosphatase 51    AST (SGOT) 18    ALT (SGPT) 15    Albumin/Globulin Ratio 1.8    BUN/Creatinine Ratio 15.7      CBC          11/12/2024    09:06   CBC   WBC 7.17    RBC 4.23    Hemoglobin 13.5    Hematocrit 40.0    MCV 94.6    MCH 31.9    MCHC 33.8    RDW 11.9    Platelets 307              Assessment:   Diagnoses and all orders for this visit:    1. Gastroesophageal reflux disease, unspecified whether esophagitis present (Primary)    2. Irritable bowel syndrome with both constipation and diarrhea    3. Gastroesophageal reflux disease without esophagitis          Plan:   -We did discuss stool studies but she feels like she is improving. Could consider alternative probiotic - align digestiive support.  At this point I recommend she start her probiotic daily.  She will reach out if she starts to become too constipated again.  If this happens we could consider updated colonoscopy and has been 10 years anyways.  Recommend she continue fiber supplement.      Follow Up   Return in about 4 weeks (around 6/26/2025).    Dragon dictation used throughout this note.         Stephy Tan PA-C  [facility]  [address] [place] [address] [address] [place] [address]  Office: [phone]

## 2025-06-03 ENCOUNTER — OFFICE VISIT (OUTPATIENT)
Dept: SLEEP MEDICINE | Facility: HOSPITAL | Age: 65
End: 2025-06-03
Payer: MEDICARE

## 2025-06-03 VITALS
HEART RATE: 93 BPM | WEIGHT: 182 LBS | OXYGEN SATURATION: 94 % | HEIGHT: 65 IN | BODY MASS INDEX: 30.32 KG/M2 | DIASTOLIC BLOOD PRESSURE: 72 MMHG | SYSTOLIC BLOOD PRESSURE: 129 MMHG

## 2025-06-03 DIAGNOSIS — E66.9 OBESITY (BMI 30-39.9): ICD-10-CM

## 2025-06-03 DIAGNOSIS — G47.33 OBSTRUCTIVE SLEEP APNEA, ADULT: Primary | ICD-10-CM

## 2025-06-03 DIAGNOSIS — F45.8 AEROPHAGIA: ICD-10-CM

## 2025-06-03 DIAGNOSIS — R68.2 DRY MOUTH: ICD-10-CM

## 2025-06-03 PROCEDURE — G0463 HOSPITAL OUTPT CLINIC VISIT: HCPCS

## 2025-06-03 NOTE — PROGRESS NOTES
[facility] SLEEP MEDICINE  [address]  [facility] [address] [phone]    PCP: Shraddha Sosa MD    Reason for visit:  Sleep disorders: HANSEL    Rosalinda is a 65 y.o.female who was seen in the [facility] Disorders [facility] today. Annual fu. She is doing well. Sleeps from 11pm to 7am. She uses a nasal pillow and it fits well. Has some dry mouth. Could not use chin strap. She feels some bloating from her device.  Ogden Sleepiness Scale is 10. Caffeine 3 per day. Alcohol 10 per week.    Rosalinda  reports that she has never smoked. She has never used smokeless tobacco.    Pertinent Positive Review of Systems of pnd, soa, acid reflux, abdominal bloating, anxiety  Rest of Review of Systems was negative as recorded in Sleep Questionnaire.    Patient  has a past medical history of Arrhythmia, Arthritis, Asthma, Bladder spasm, Colon polyp, CTS (carpal tunnel syndrome), Endometriosis, Gastritis, GERD (gastroesophageal reflux disease), Hematuria- has had neg urology workup, History of gastroesophageal reflux (GERD) (2000), History of medical problems (moderate sleep apnea), IBS (irritable bowel syndrome), Lactose intolerance (a couple of years ago), Low back pain, Migraine, Migraines, Osteoarthritis, Osteopenia, Ovarian cyst, Palpitations, Panic attacks, Panic attacks, PONV (postoperative nausea and vomiting), Premature atrial contractions, Right sided abdominal pain, S/P D&C (status post dilation and curettage) (03/03/2020), Scoliosis, Sleep apnea, SOB (shortness of breath), Spotting, Status post laparoscopic hysterectomy (11/17/2020), SVT (supraventricular tachycardia), Tingling, Urinary tract infection, and Varicella.     Current Medications:    Current Outpatient Medications:     ASMANEX, 30 METERED DOSES, 110 MCG/INH inhaler, Inhale 1-2 puffs Daily. Can use twice a day if needed, Disp: , Rfl:     Azelastine HCl 137 MCG/SPRAY solution, , Disp: , Rfl:     Biotin 71100 MCG tablet, Take 1 tablet by mouth  Daily., Disp: , Rfl:     calcium carb-cholecalciferol 600-800 MG-UNIT tablet, Take 1 tablet by mouth Daily., Disp: , Rfl:     Slater Oil-Levomenthol (FDgard) 25-20.75 MG capsule, Take 2 capsules by mouth 3 (Three) Times a Day As Needed (stomach issues)., Disp: , Rfl:     desloratadine (CLARINEX) 5 MG tablet, Take 1 tablet by mouth Every Morning., Disp: , Rfl:     dilTIAZem (CARDIZEM) 60 MG tablet, TAKE 2 TABLETS DAILY AND 1 TABLET EVERY EVENING, Disp: 270 tablet, Rfl: 3    estradiol (ESTRACE) 0.1 MG/GM vaginal cream, Insert 1 g into the vagina 2 (Two) Times a Day., Disp: 42.5 g, Rfl: 5    estradiol (VIVELLE-DOT) 0.0375 MG/24HR patch, Place 1 patch on the skin as directed by provider 2 (Two) Times a Week., Disp: 24 patch, Rfl: 3    FLUTICASONE PROPIONATE, NASAL, NA, 1 spray into the nostril(s) as directed by provider Daily As Needed., Disp: , Rfl:     metroNIDAZOLE (METROCREAM) 0.75 % cream, Apply ONE application ON THE SKIN daily, Disp: , Rfl:     montelukast (SINGULAIR) 10 MG tablet, Take 1 tablet by mouth Every Night., Disp: , Rfl:     Multiple Vitamin (MULTI VITAMIN DAILY PO), Take 2 tablets by mouth Daily., Disp: , Rfl:     pantoprazole (PROTONIX) 40 MG EC tablet, Take 1 tablet by mouth 2 (Two) Times a Day Before Meals., Disp: 180 tablet, Rfl: 1    Peppermint Oil (IBgard) 90 MG capsule controlled-release, Take 2 capsules by mouth 3 (Three) Times a Day As Needed (stomach issues). IBgard, Disp: , Rfl:     PROAIR  (90 BASE) MCG/ACT inhaler, Inhale 2 puffs Every 4 (Four) Hours As Needed., Disp: , Rfl:     Probiotic Product (Align) capsule, Take 1 capsule by mouth Daily., Disp: , Rfl:     Unable to find, 1 each 1 (One) Time. Relief Factor Take one packet per day., Disp: , Rfl:     uribel (URO-MP) 118 MG capsule capsule, Take 1 capsule by mouth 3 (Three) Times a Day As Needed (pain)., Disp: 30 capsule, Rfl: 0    Vevye 0.1 % solution, , Disp: , Rfl:     Wheat Dextrin (BENEFIBER) powder, Take 1 each by mouth  "Daily., Disp: , Rfl:     XIIDRA 5 % solution, Administer 1 drop to both eyes 2 (Two) Times a Day., Disp: , Rfl:    also entered in Sleep Questionnaire         Vital Signs: /72   Pulse 93   Ht 165.1 cm (65\")   Wt 82.6 kg (182 lb)   LMP  (LMP Unknown)   SpO2 94%   BMI 30.29 kg/m²     Body mass index is 30.29 kg/m².       Tongue: Normal       Dentition: good       Pharynx: Posterior pharyngeal pillars are wide   Mallampatti: II (hard and soft palate, upper portion of tonsils anduvula visible)        General: Alert. Cooperative. Well developed. No acute distress.             Nose: No septal deviation. No drainage.          Throat: No oral lesions. No thrush. Moist mucous membranes.           Lungs:  Clear to auscultation bilaterally.            Heart:  Regular rhythm and normal rate. No murmur.     Diagnostic data available to date is as below and was reviewed on current visit:  8/8/22: The patient tolerated the home sleep testing with monitoring time of 519 minutes. The data obtained make this a technically adequate study. The apnea hypopneas index(AHI) was 17.8 per sleep hour.  The AHI during supine position was - per sleep hour. Mean heart rate of 81.2 BPM.  Snoring was noted 0.7% of sleep time. Lowest oxygen saturation during the study was 78%. Saturation below 89% was noted for 70.4 mins.     No results found for: \"IRON\", \"TIBC\", \"FERRITIN\"    Most current available usage data reviewed on 06/03/2025:        [employer]: [employer]    Prescription to [facility] for replacement supplies as below:    nasal pillow    Replace head-gear every 3 months.  Replace cushions every 2-4 weeks.     A 4604 Heated Tubing  every 3 mth    A 7037 Standard Tubing  every 3 mth   x A 7035 Headgear  every 3 mth   x A 7046 Repl Humidifier Chamber  every 6 yrs   x A 7038 Disposable Filters  2 per mth   x A 7039 Non-disposable Filter  every 6 mth   x A 7036 Chin Strap  every 6 mth     Orders Placed This Encounter   Procedures    Pulmonary " Results Scan          Impression:  1. Obstructive sleep apnea, adult    2. Obesity (BMI 30-39.9)    3. Aerophagia    4. Dry mouth        Plan:  Rosalinda is having some aerophagia and dry mouth.  Overall she is compliant with his CPAP and uses it nicely.  Hopefully lower pressures to auto 4-8 will be sufficient.  If she continues to have aerophagia or excessive dry mouth she was asked to give us a call.    Effective treatment of sleep apnea reduces the associated cardiovascular and cerebrovascular risks associated. In patients with elevated BMI, weight loss can be additionally beneficial. With use of positive pressure device; change of supplies per the permitted insurance schedule is necessary.    This patient is compliant with PAP machine and benefits from its use.  Apnea hypopneas index is corrected/improved.  Daytime hypersomnolence has resolved.     Patient will follow up in this clinic in 1 year APRN    Thank you for allowing me to participate in your patient's care.    Electronically signed by Brodie Gabriel MD, 06/03/25, 2:37 PM EDT.    Part of this note may be an electronic transcription/translation of spoken language to printed text using the Dragon Dictation System.

## 2025-06-09 ENCOUNTER — TELEPHONE (OUTPATIENT)
Age: 65
End: 2025-06-09
Payer: MEDICARE

## 2025-06-09 NOTE — TELEPHONE ENCOUNTER
Patient presented to [facility] ED on Thursday 6/5.    Records have been scanned into Epic under Media.    The patient would like for you to review records and advise.

## 2025-06-10 RX ORDER — ESTRADIOL 0.1 MG/G
CREAM VAGINAL
Qty: 42.5 G | Refills: 1 | Status: SHIPPED | OUTPATIENT
Start: 2025-06-10

## 2025-07-04 DIAGNOSIS — K21.9 GASTROESOPHAGEAL REFLUX DISEASE, UNSPECIFIED WHETHER ESOPHAGITIS PRESENT: ICD-10-CM

## 2025-07-07 RX ORDER — PANTOPRAZOLE SODIUM 40 MG/1
40 TABLET, DELAYED RELEASE ORAL
Qty: 180 TABLET | Refills: 1 | Status: SHIPPED | OUTPATIENT
Start: 2025-07-07

## 2025-07-15 ENCOUNTER — HOSPITAL ENCOUNTER (OUTPATIENT)
Dept: MAMMOGRAPHY | Facility: HOSPITAL | Age: 65
Discharge: HOME | End: 2025-07-15
Payer: MEDICARE

## 2025-07-15 ENCOUNTER — HOSPITAL ENCOUNTER (OUTPATIENT)
Dept: ULTRASOUND IMAGING | Facility: HOSPITAL | Age: 65
Discharge: HOME | End: 2025-07-15
Payer: MEDICARE

## 2025-07-15 ENCOUNTER — TELEPHONE (OUTPATIENT)
Dept: MAMMOGRAPHY | Facility: HOSPITAL | Age: 65
End: 2025-07-15
Payer: MEDICARE

## 2025-07-15 DIAGNOSIS — R92.8 ABNORMAL MAMMOGRAM: ICD-10-CM

## 2025-07-15 PROCEDURE — 77065 DX MAMMO INCL CAD UNI: CPT

## 2025-07-15 PROCEDURE — 76642 ULTRASOUND BREAST LIMITED: CPT

## 2025-07-15 PROCEDURE — G0279 TOMOSYNTHESIS, MAMMO: HCPCS

## 2025-07-15 NOTE — TELEPHONE ENCOUNTER
Called  Regarding her recent left breast diagnostic mammogram. There was an abnormality seen in the left breast for which biopsy is recommended. She is recommended to proceed with a left breast US guided biopsy. This was schedule for 07/22/2025 at 230 she will arrive at 130 .She voiced understanding and will call with further questions or concerns.

## 2025-07-16 ENCOUNTER — TELEPHONE (OUTPATIENT)
Dept: MAMMOGRAPHY | Facility: HOSPITAL | Age: 65
End: 2025-07-16
Payer: MEDICARE

## 2025-07-16 NOTE — TELEPHONE ENCOUNTER
Spoke to patient informing her to arrive at 1:30PM for 2:30PM appointment on 7/22/25 in mammography department. Informed patient that there are no food or drink restrictions and that she can drive herself to and from appointment. Advised patient regarding type of clothing to wear, including a comfortable bra, and timeframe to expect results. This RN provided contact number should she need to reach us prior to appointment. Patient verbalized understanding.

## 2025-07-17 ENCOUNTER — OFFICE VISIT (OUTPATIENT)
Dept: GASTROENTEROLOGY | Facility: CLINIC | Age: 65
End: 2025-07-17
Payer: MEDICARE

## 2025-07-17 VITALS
BODY MASS INDEX: 29.46 KG/M2 | DIASTOLIC BLOOD PRESSURE: 83 MMHG | WEIGHT: 176.8 LBS | HEIGHT: 65 IN | TEMPERATURE: 97.3 F | SYSTOLIC BLOOD PRESSURE: 118 MMHG | HEART RATE: 99 BPM

## 2025-07-17 DIAGNOSIS — K21.9 GASTROESOPHAGEAL REFLUX DISEASE, UNSPECIFIED WHETHER ESOPHAGITIS PRESENT: Primary | ICD-10-CM

## 2025-07-17 DIAGNOSIS — K58.2 IRRITABLE BOWEL SYNDROME WITH BOTH CONSTIPATION AND DIARRHEA: ICD-10-CM

## 2025-07-17 PROCEDURE — 1160F RVW MEDS BY RX/DR IN RCRD: CPT

## 2025-07-17 PROCEDURE — 1159F MED LIST DOCD IN RCRD: CPT

## 2025-07-17 PROCEDURE — 99213 OFFICE O/P EST LOW 20 MIN: CPT

## 2025-07-17 NOTE — PROGRESS NOTES
"Chief Complaint  Heartburn    Subjective          History of Present Illness    Rosalinda Boone is a  65 y.o. female presents for follow-up for GERD and IBS.  She is a patient Dr. Real was last seen by me in the office May 2025.    At last visit she was doing well on Protonix twice daily.  She has failed Prevacid in the past.  IBS was well-controlled with align every other day. She has been trying different strains of align to help her symptoms but cannot find one to totally resolve her problems. If one helps bloating it causes diarrhea, if one helps diarrhea it causes constipation and bloating.  She does have a history of SIBO.  She reports she has had a lot going on recently and is going for breast biopsy next Tuesday.    CMP 11/12/2024 shows normal kidney function and normal LFTs.  CBC shows normal hemoglobin.     CT abdomen pelvis in 2017 was unremarkable other than tortuous colon.     Colonoscopy 12/5/2016 showed one 3 mm polyp in descending colon, nonbleeding internal hemorrhoids and otherwise normal exam. Repeat 10 years.      EGD 12/5/2016 showed normal esophagus, regular Z-line, granular gastric mucosa, normal examined duodenum.     No family history of colon cancer.     Objective   Vital Signs:   /83 (BP Location: Left arm, Patient Position: Sitting, Cuff Size: Adult)   Pulse 99   Temp 97.3 °F (36.3 °C) (Temporal)   Ht 165.1 cm (65\")   Wt 80.2 kg (176 lb 12.8 oz)   BMI 29.42 kg/m²       Physical Exam  Constitutional:       General: She is not in acute distress.     Appearance: Normal appearance.   Eyes:      General: No scleral icterus.  Pulmonary:      Effort: Pulmonary effort is normal.   Abdominal:      General: Abdomen is flat. There is no distension.      Tenderness: There is no abdominal tenderness. There is no guarding.   Skin:     Coloration: Skin is not jaundiced.   Neurological:      General: No focal deficit present.      Mental Status: She is alert and oriented to person, place, and " time.   Psychiatric:         Mood and Affect: Mood normal.         Behavior: Behavior normal.          Result Review :   The following data was reviewed by: Stephy Tan PA-C on 07/17/2025:  CMP          11/12/2024    09:06   CMP   Glucose 95    BUN 13    Creatinine 0.83    EGFR 78.8    Sodium 141    Potassium 4.4    Chloride 106    Calcium 8.7    Total Protein 6.5    Albumin 4.2    Globulin 2.3    Total Bilirubin 0.2    Alkaline Phosphatase 51    AST (SGOT) 18    ALT (SGPT) 15    Albumin/Globulin Ratio 1.8    BUN/Creatinine Ratio 15.7      CBC          11/12/2024    09:06   CBC   WBC 7.17    RBC 4.23    Hemoglobin 13.5    Hematocrit 40.0    MCV 94.6    MCH 31.9    MCHC 33.8    RDW 11.9    Platelets 307              Assessment:   Diagnoses and all orders for this visit:    1. Gastroesophageal reflux disease, unspecified whether esophagitis present (Primary)    2. Irritable bowel syndrome with both constipation and diarrhea          Plan:   - Okay to continue adjusting fiber and probiotics as needed.  She does have history of SIBO and with ongoing bloating and diarrhea we did discuss retreatment with Xifaxan.  She would like to hold off on this for now.  -She will be due for colonoscopy 2026 for screening purposes.      Follow Up   Return in about 3 months (around 10/17/2025).    Dragon dictation used throughout this note.         Stephy Tan PA-C  [facility]  [address] [place] [address]  Office: [phone]

## 2025-07-21 ENCOUNTER — TELEPHONE (OUTPATIENT)
Dept: SLEEP MEDICINE | Facility: HOSPITAL | Age: 65
End: 2025-07-21
Payer: MEDICARE

## 2025-07-21 NOTE — TELEPHONE ENCOUNTER
Pt called stating she would like her pressures decreased . Will pull download for Dr to review for recommendations .

## 2025-07-22 ENCOUNTER — HOSPITAL ENCOUNTER (OUTPATIENT)
Dept: ULTRASOUND IMAGING | Facility: HOSPITAL | Age: 65
Discharge: HOME | End: 2025-07-22
Payer: MEDICARE

## 2025-07-22 ENCOUNTER — HOSPITAL ENCOUNTER (OUTPATIENT)
Dept: MAMMOGRAPHY | Facility: HOSPITAL | Age: 65
Discharge: HOME | End: 2025-07-22
Payer: MEDICARE

## 2025-07-22 VITALS
TEMPERATURE: 97 F | OXYGEN SATURATION: 98 % | RESPIRATION RATE: 17 BRPM | DIASTOLIC BLOOD PRESSURE: 62 MMHG | SYSTOLIC BLOOD PRESSURE: 122 MMHG | HEART RATE: 84 BPM

## 2025-07-22 DIAGNOSIS — R92.8 ABNORMAL MAMMOGRAM: ICD-10-CM

## 2025-07-22 PROCEDURE — 77065 DX MAMMO INCL CAD UNI: CPT

## 2025-07-22 PROCEDURE — A4648 IMPLANTABLE TISSUE MARKER: HCPCS

## 2025-07-22 PROCEDURE — 88305 TISSUE EXAM BY PATHOLOGIST: CPT | Performed by: OBSTETRICS & GYNECOLOGY

## 2025-07-22 PROCEDURE — 25010000002 LIDOCAINE 1% - EPINEPHRINE 1:100000 1 %-1:100000 SOLUTION: Performed by: RADIOLOGY

## 2025-07-22 PROCEDURE — 25010000002 LIDOCAINE 1 % SOLUTION: Performed by: RADIOLOGY

## 2025-07-22 RX ORDER — LIDOCAINE HYDROCHLORIDE AND EPINEPHRINE 10; 10 MG/ML; UG/ML
10 INJECTION, SOLUTION INFILTRATION; PERINEURAL ONCE
Status: COMPLETED | OUTPATIENT
Start: 2025-07-22 | End: 2025-07-22

## 2025-07-22 RX ORDER — LIDOCAINE HYDROCHLORIDE 10 MG/ML
10 INJECTION, SOLUTION INFILTRATION; PERINEURAL ONCE
Status: COMPLETED | OUTPATIENT
Start: 2025-07-22 | End: 2025-07-22

## 2025-07-22 RX ADMIN — LIDOCAINE HYDROCHLORIDE,EPINEPHRINE BITARTRATE 10 ML: 10; .01 INJECTION, SOLUTION INFILTRATION; PERINEURAL at 14:35

## 2025-07-22 RX ADMIN — LIDOCAINE HYDROCHLORIDE 9 ML: 10 INJECTION, SOLUTION INFILTRATION; PERINEURAL at 14:35

## 2025-07-23 ENCOUNTER — TELEPHONE (OUTPATIENT)
Dept: SLEEP MEDICINE | Facility: HOSPITAL | Age: 65
End: 2025-07-23
Payer: MEDICARE

## 2025-07-23 ENCOUNTER — TELEPHONE (OUTPATIENT)
Dept: MAMMOGRAPHY | Facility: HOSPITAL | Age: 65
End: 2025-07-23
Payer: MEDICARE

## 2025-07-23 LAB
CYTO UR: NORMAL
LAB AP CASE REPORT: NORMAL
LAB AP CLINICAL INFORMATION: NORMAL
LAB AP INTRADEPARTMENTAL CONSULT: NORMAL
PATH REPORT.FINAL DX SPEC: NORMAL
PATH REPORT.GROSS SPEC: NORMAL

## 2025-07-23 NOTE — TELEPHONE ENCOUNTER
Bedside shift change report given to Rhea TIERNEY (oncoming nurse) by Moraima Godinez RN   (offgoing nurse). Report included the following information SBAR, Kardex, Intake/Output, MAR and Recent Results. Post biopsy call. Patient doing well. No concerns at this time

## 2025-07-24 DIAGNOSIS — G47.33 OBSTRUCTIVE SLEEP APNEA, ADULT: Primary | ICD-10-CM

## 2025-07-25 ENCOUNTER — TELEPHONE (OUTPATIENT)
Dept: MAMMOGRAPHY | Facility: HOSPITAL | Age: 65
End: 2025-07-25
Payer: MEDICARE

## 2025-07-31 ENCOUNTER — OFFICE VISIT (OUTPATIENT)
Age: 65
End: 2025-07-31
Payer: MEDICARE

## 2025-07-31 VITALS
SYSTOLIC BLOOD PRESSURE: 118 MMHG | WEIGHT: 177 LBS | DIASTOLIC BLOOD PRESSURE: 76 MMHG | HEART RATE: 67 BPM | HEIGHT: 65 IN | BODY MASS INDEX: 29.49 KG/M2

## 2025-07-31 DIAGNOSIS — I49.1 PREMATURE ATRIAL CONTRACTIONS: Primary | ICD-10-CM

## 2025-07-31 DIAGNOSIS — R00.2 HEART PALPITATIONS: ICD-10-CM

## 2025-07-31 PROCEDURE — 93000 ELECTROCARDIOGRAM COMPLETE: CPT

## 2025-07-31 PROCEDURE — 99213 OFFICE O/P EST LOW 20 MIN: CPT

## 2025-07-31 NOTE — PROGRESS NOTES
Date of Office Visit: 2025  Encounter Provider: ANOOP Kolb  Place of Service: [facility] CARDIOLOGY  Patient Name: Rosalinda Boone  :1960    Chief Complaint   Patient presents with    Palpitations   :     HPI: Rosalinda Boone is a 65 y.o. female who followed with Dr. Samuels, now Dr. Hayward. She has a history of palpitations and premature ventricular contractions.      She has a history of palpitations going back several years. She felt like started around menopause. She has a lot of anxiety in regard to her palpitations which she feels makes them worse.      Saw Dr. Hayward last year. Offered reassurance and continue diltiazem.      She had an episode in  of this year of sustained palpitations and tachycardia.  When she got to the ED at [facility].  She was in sinus tachycardia with a rate of 120.  Her testing was normal and she was sent home.            She presents today for follow-up appointment.    She had been doing very well without significant episodes.    In  she had an episode that woke her up from her sleep.  She had fluttering said it felt different than usual.  This lasted about 2 hours, she took an extra dose of diltiazem and then finally went to the ED.    I reviewed those EKGs from the ED and they show sinus tachycardia.  She says that her symptoms had mostly resolved by the time she got to the ED.    EKG today shows sinus rhythm.    Done multiple monitors in the past.  Mostly show small burden of PACs.  There have been a couple episodes of short SVT but nothing sustained.    She has ordered a Kardia.          Past Medical History:   Diagnosis Date    Arrhythmia     Arthritis     HANDS, BACK , FEET, NECK    Asthma     Bladder spasm     Colon polyp     CTS (carpal tunnel syndrome)     Endometriosis     Gastritis     GERD (gastroesophageal reflux disease)     Hematuria- has had neg urology workup     History of gastroesophageal reflux (GERD)      History of medical problems moderate sleep apnea    IBS (irritable bowel syndrome)     Lactose intolerance a couple of years ago    Low back pain     Migraine     Migraines     Osteoarthritis     Osteopenia     Ovarian cyst     Palpitations     lov cardiology 7/2020    Panic attacks     Panic attacks     PONV (postoperative nausea and vomiting)     Premature atrial contractions     Right sided abdominal pain     S/P D&C (status post dilation and curettage) 03/03/2020    Scoliosis     Sleep apnea     SOB (shortness of breath)     Spotting     SCHEDULED FOR D&C    Status post laparoscopic hysterectomy 11/17/2020    SVT (supraventricular tachycardia)     14 day Zio Patch starting on 12/22/17: 33 brief runs of SVT (longest 20 beats in length at 153 bpm).     Tingling     in hands     Urinary tract infection     Varicella        Past Surgical History:   Procedure Laterality Date    BREAST BIOPSY  2015    COLONOSCOPY N/A 12/05/2016    NBIH,     D & C HYSTEROSCOPY N/A 03/03/2020    Procedure: DILATATION AND CURETTAGE HYSTEROSCOPY, possible myosure;  Surgeon: Giana Paul MD;  Location: [facility];  Service: Obstetrics/Gynecology;  Laterality: N/A;  DILATION AND CURETTAGE HYSTEROSCOPY    D & C WITH SUCTION      for DUB    DILATATION AND CURETTAGE      ENDOSCOPY N/A 12/05/2016    z line regular, 40 cm from incisors,  granular gastric mucosa, chronic gastritis    LAPAROSCOPIC ASSISTED VAGINAL HYSTERECTOMY SALPINGO OOPHORECTOMY      LASIK      OOPHORECTOMY      TOTAL LAPAROSCOPIC HYSTERECTOMY N/A 11/17/2020    Procedure: TOTAL LAPAROSCOPIC HYSTERECTOMY with bilateral salpingoophorectomy;  Surgeon: Giana Paul MD;  Location: [facility];  Service: Obstetrics/Gynecology;  Laterality: N/A;    UPPER GASTROINTESTINAL ENDOSCOPY  12/5/2016    WISDOM TOOTH EXTRACTION         Social History     Socioeconomic History    Marital status:      Spouse name: MATTIE ALMARAZ    Number of children: 2   Tobacco Use     Smoking status: Never    Smokeless tobacco: Never   Vaping Use    Vaping status: Never Used   Substance and Sexual Activity    Alcohol use: Yes     Alcohol/week: 1.0 - 2.0 standard drink of alcohol     Types: 1 - 2 Glasses of wine per week     Comment: With dinner    Drug use: No    Sexual activity: Yes     Partners: Male     Birth control/protection: Post-menopausal, Hysterectomy     Comment: I am on hormone therapy.       Family History   Problem Relation Age of Onset    Colon polyps Mother     Aneurysm Mother 73        AAA    Arthritis Mother     Irritable bowel syndrome Mother     Other Mother         aneurysm    Cancer Father 63        lung-smoker    Arthritis Father     Scleroderma Maternal Aunt     Stroke Maternal Aunt     Leukemia Maternal Grandmother     Vision loss Maternal Grandmother     Aneurysm Maternal Grandfather         Cerebral    Other Maternal Grandfather         aneurysm    Pulmonary embolism Maternal Grandfather         abdominal    Breast cancer Neg Hx     Ovarian cancer Neg Hx     Colon cancer Neg Hx     Deep vein thrombosis Neg Hx     Malig Hyperthermia Neg Hx     Uterine cancer Neg Hx        Review of Systems   Constitutional: Negative for chills, fever and malaise/fatigue.   Cardiovascular:  Negative for chest pain, dyspnea on exertion, leg swelling, near-syncope, orthopnea, palpitations, paroxysmal nocturnal dyspnea and syncope.   Respiratory:  Negative for cough and shortness of breath.    Hematologic/Lymphatic: Negative.    Musculoskeletal:  Negative for joint pain, joint swelling and myalgias.   Gastrointestinal:  Negative for abdominal pain, diarrhea, melena, nausea and vomiting.   Genitourinary:  Negative for frequency and hematuria.   Neurological:  Negative for light-headedness, numbness, paresthesias and seizures.   Allergic/Immunologic: Negative.    All other systems reviewed and are negative.      Allergies   Allergen Reactions    Codeine Dizziness    Levaquin [Levofloxacin]  GI Intolerance     FACIAL NUMBNESS    Qvar [Beclomethasone] GI Intolerance     Tingling on one side of face    Sulfa Antibiotics Hives    Erythromycin Diarrhea    Keflex [Cephalexin] Rash         Current Outpatient Medications:     ASMANEX, 30 METERED DOSES, 110 MCG/INH inhaler, Inhale 1-2 puffs Daily. Can use twice a day if needed, Disp: , Rfl:     Azelastine HCl 137 MCG/SPRAY solution, , Disp: , Rfl:     Biotin 73678 MCG tablet, Take 1 tablet by mouth Daily., Disp: , Rfl:     calcium carb-cholecalciferol 600-800 MG-UNIT tablet, Take 1 tablet by mouth Daily., Disp: , Rfl:     Estelita Oil-Levomenthol (FDgard) 25-20.75 MG capsule, Take 2 capsules by mouth 3 (Three) Times a Day As Needed (stomach issues)., Disp: , Rfl:     desloratadine (CLARINEX) 5 MG tablet, Take 1 tablet by mouth Every Morning., Disp: , Rfl:     dilTIAZem (CARDIZEM) 60 MG tablet, TAKE 2 TABLETS DAILY AND 1 TABLET EVERY EVENING, Disp: 270 tablet, Rfl: 3    estradiol (ESTRACE) 0.1 MG/GM vaginal cream, INSERT 1 GRAM VAGINALLY TWICE A WEEK, Disp: 42.5 g, Rfl: 1    estradiol (VIVELLE-DOT) 0.0375 MG/24HR patch, Place 1 patch on the skin as directed by provider 2 (Two) Times a Week., Disp: 24 patch, Rfl: 3    FLUTICASONE PROPIONATE, NASAL, NA, 1 spray into the nostril(s) as directed by provider Daily As Needed., Disp: , Rfl:     metroNIDAZOLE (METROCREAM) 0.75 % cream, Apply ONE application ON THE SKIN daily, Disp: , Rfl:     montelukast (SINGULAIR) 10 MG tablet, Take 1 tablet by mouth Every Night., Disp: , Rfl:     Multiple Vitamin (MULTI VITAMIN DAILY PO), Take 2 tablets by mouth Daily., Disp: , Rfl:     pantoprazole (PROTONIX) 40 MG EC tablet, Take 1 tablet by mouth 2 (Two) Times a Day Before Meals., Disp: 180 tablet, Rfl: 1    Peppermint Oil (IBgard) 90 MG capsule controlled-release, Take 2 capsules by mouth 3 (Three) Times a Day As Needed (stomach issues). IBgard, Disp: , Rfl:     PROAIR  (90 BASE) MCG/ACT inhaler, Inhale 2 puffs Every 4  "(Four) Hours As Needed., Disp: , Rfl:     Probiotic Product (Align) capsule, Take 1 capsule by mouth Daily., Disp: , Rfl:     Unable to find, 1 each 1 (One) Time. Relief Factor Take one packet per day., Disp: , Rfl:     uribel (URO-MP) 118 MG capsule capsule, Take 1 capsule by mouth 3 (Three) Times a Day As Needed (pain)., Disp: 30 capsule, Rfl: 0    Vevye 0.1 % solution, , Disp: , Rfl:     Wheat Dextrin (BENEFIBER) powder, Take 1 each by mouth Daily., Disp: , Rfl:       Objective:     Vitals:    07/31/25 1400   BP: 118/76   BP Location: Right arm   Patient Position: Sitting   Cuff Size: Adult   Pulse: 67   Weight: 80.3 kg (177 lb)   Height: 165.1 cm (65\")     Body mass index is 29.45 kg/m².    PHYSICAL EXAM:    Vitals Reviewed.   General Appearance: No acute distress, well developed and well nourished.   Respiratory: No signs of respiratory distress. Respiration rhythm and depth normal.   Cardiovascular:  Heart Rate and Rhythm: Normal  Skin: Warm and dry.   Psychiatric: Patient alert and oriented to person, place, and time. Speech and behavior appropriate. Normal mood and affect.       ECG 12 Lead    Date/Time: 7/31/2025 2:36 PM  Performed by: Carlos Enrique Henry APRN    Authorized by: Carlos Enrique Henry APRN  Comparison: compared with previous ECG   Similar to previous ECG  Rhythm: sinus rhythm            Assessment:       Diagnosis Plan   1. Premature atrial contractions        2. Heart palpitations               Plan:   1-2.  Palpitations----she has had some PACs and brief SVT on monitors in the past but there has been nothing sustained.  She had a more sustained episode last month but has not had any recurrence.  I discussed with her and I think she would be a great candidate for a Kardia as these episodes are fairly infrequent and we have been unable to capture any on routine monitoring, the other option would be to consider a loop implant but she prefers Kardia first which I think is reasonable.        Follow-up in " 6 months.            I spent at least 30 minutes reviewing previous notes, labs, EKGs, device reports and/or time with the patient.         As always, it has been a pleasure to participate in your patient's care.      Sincerely,         ANOOP Kolb

## 2025-08-01 DIAGNOSIS — I49.1 PREMATURE ATRIAL CONTRACTIONS: ICD-10-CM

## 2025-08-04 RX ORDER — DILTIAZEM HCL 60 MG
TABLET ORAL
Qty: 270 TABLET | Refills: 3 | Status: SHIPPED | OUTPATIENT
Start: 2025-08-04

## 2025-08-04 RX ORDER — ESTRADIOL 0.04 MG/D
PATCH, EXTENDED RELEASE TRANSDERMAL
Qty: 24 PATCH | Refills: 0 | Status: SHIPPED | OUTPATIENT
Start: 2025-08-04

## (undated) DEVICE — CYSTO/BLADDER IRRIGATION SET, REGULATING CLAMP

## (undated) DEVICE — ENDOPATH 5MM ENDOSCOPIC BLUNT TIP DISSECTORS (12 POUCHES CONTAINING 3 DISSECTORS EACH): Brand: ENDOPATH

## (undated) DEVICE — ENDOPATH XCEL BLADELESS TROCARS WITH STABILITY SLEEVES: Brand: ENDOPATH XCEL

## (undated) DEVICE — APPL CHLORAPREP W/TINT 26ML ORNG

## (undated) DEVICE — SYR LL TP 10ML STRL

## (undated) DEVICE — GLV SURG SENSICARE PI MIC PF SZ7.5 LF STRL

## (undated) DEVICE — HARMONIC ACE +7 LAPAROSCOPIC SHEARS ADVANCED HEMOSTASIS 5MM DIAMETER 36CM SHAFT LENGTH  FOR USE WITH GRAY HAND PIECE ONLY: Brand: HARMONIC ACE

## (undated) DEVICE — ENDOPATH PNEUMONEEDLE INSUFFLATION NEEDLES WITH LUER LOCK CONNECTORS 120MM: Brand: ENDOPATH

## (undated) DEVICE — IRRIGATOR BULB ASEPTO 60CC STRL

## (undated) DEVICE — SUCTION CANISTER, 1000CC,SAFELINER: Brand: DEROYAL

## (undated) DEVICE — GLV SURG SENSICARE ORTHO PF LF 7 STRL

## (undated) DEVICE — PK TLH 90

## (undated) DEVICE — LAPAROSCOPIC SMOKE FILTRATION SYSTEM: Brand: PALL LAPAROSHIELD® PLUS LAPAROSCOPIC SMOKE FILTRATION SYSTEM

## (undated) DEVICE — SAFESECURE,SECUREMENT,FOLEY CATH,STERILE: Brand: MEDLINE

## (undated) DEVICE — DECANTER: Brand: UNBRANDED

## (undated) DEVICE — OCCL COLPO PNEUMO  STRL

## (undated) DEVICE — LAG PERI GYN: Brand: MEDLINE INDUSTRIES, INC.

## (undated) DEVICE — SUT MNCRYL 4/0 PS2 18 IN

## (undated) DEVICE — TOWEL,OR,DSP,ST,BLUE,STD,4/PK,20PK/CS: Brand: MEDLINE

## (undated) DEVICE — CONTAINER,SPECIMEN,OR STERILE,4OZ: Brand: MEDLINE

## (undated) DEVICE — MANIP UTER RUMI TP 6.7MM 10CM GRN

## (undated) DEVICE — NDL HYPO PRECISIONGLIDE REG 25G 1 1/2

## (undated) DEVICE — GLV SURG NEOLON 2G PF LF 6.5 STRL

## (undated) DEVICE — SUT VIC 0 CT1 36IN J946H